# Patient Record
Sex: FEMALE | Race: WHITE | NOT HISPANIC OR LATINO | Employment: UNEMPLOYED | URBAN - METROPOLITAN AREA
[De-identification: names, ages, dates, MRNs, and addresses within clinical notes are randomized per-mention and may not be internally consistent; named-entity substitution may affect disease eponyms.]

---

## 2017-03-31 ENCOUNTER — RECORDS - HEALTHEAST (OUTPATIENT)
Dept: LAB | Facility: CLINIC | Age: 52
End: 2017-03-31

## 2017-03-31 LAB
CHOLEST SERPL-MCNC: 213 MG/DL
FASTING STATUS PATIENT QL REPORTED: NO
HDLC SERPL-MCNC: 40 MG/DL
LDLC SERPL CALC-MCNC: 126 MG/DL
TRIGL SERPL-MCNC: 235 MG/DL

## 2018-03-06 ENCOUNTER — RECORDS - HEALTHEAST (OUTPATIENT)
Dept: ADMINISTRATIVE | Facility: OTHER | Age: 53
End: 2018-03-06

## 2018-03-06 ENCOUNTER — COMMUNICATION - HEALTHEAST (OUTPATIENT)
Dept: VASCULAR SURGERY | Facility: CLINIC | Age: 53
End: 2018-03-06

## 2018-04-06 ENCOUNTER — RECORDS - HEALTHEAST (OUTPATIENT)
Dept: LAB | Facility: CLINIC | Age: 53
End: 2018-04-06

## 2018-04-06 LAB
CHOLEST SERPL-MCNC: 157 MG/DL
FASTING STATUS PATIENT QL REPORTED: ABNORMAL
HDLC SERPL-MCNC: 37 MG/DL
IRON SATN MFR SERPL: 6 % (ref 20–50)
IRON SERPL-MCNC: 20 UG/DL (ref 42–175)
LDLC SERPL CALC-MCNC: 93 MG/DL
TIBC SERPL-MCNC: 348 UG/DL (ref 313–563)
TRANSFERRIN SERPL-MCNC: 279 MG/DL (ref 212–360)
TRIGL SERPL-MCNC: 135 MG/DL

## 2018-09-07 ENCOUNTER — RECORDS - HEALTHEAST (OUTPATIENT)
Dept: LAB | Facility: CLINIC | Age: 53
End: 2018-09-07

## 2018-09-07 LAB
ALBUMIN SERPL-MCNC: 3.6 G/DL (ref 3.5–5)
ALP SERPL-CCNC: 114 U/L (ref 45–120)
ALT SERPL W P-5'-P-CCNC: 16 U/L (ref 0–45)
ANION GAP SERPL CALCULATED.3IONS-SCNC: 9 MMOL/L (ref 5–18)
AST SERPL W P-5'-P-CCNC: 17 U/L (ref 0–40)
BILIRUB SERPL-MCNC: 0.2 MG/DL (ref 0–1)
BUN SERPL-MCNC: 14 MG/DL (ref 8–22)
CALCIUM SERPL-MCNC: 9.2 MG/DL (ref 8.5–10.5)
CHLORIDE BLD-SCNC: 102 MMOL/L (ref 98–107)
CO2 SERPL-SCNC: 25 MMOL/L (ref 22–31)
CREAT SERPL-MCNC: 0.72 MG/DL (ref 0.6–1.1)
GFR SERPL CREATININE-BSD FRML MDRD: >60 ML/MIN/1.73M2
GLUCOSE BLD-MCNC: 294 MG/DL (ref 70–125)
POTASSIUM BLD-SCNC: 4.7 MMOL/L (ref 3.5–5)
PROT SERPL-MCNC: 7.1 G/DL (ref 6–8)
SODIUM SERPL-SCNC: 136 MMOL/L (ref 136–145)

## 2018-09-28 ENCOUNTER — RECORDS - HEALTHEAST (OUTPATIENT)
Dept: LAB | Facility: CLINIC | Age: 53
End: 2018-09-28

## 2018-09-28 LAB
ERYTHROCYTE [DISTWIDTH] IN BLOOD BY AUTOMATED COUNT: 12.7 % (ref 11–14.5)
HCT VFR BLD AUTO: 38 % (ref 35–47)
HGB BLD-MCNC: 12.1 G/DL (ref 12–16)
MCH RBC QN AUTO: 27.3 PG (ref 27–34)
MCHC RBC AUTO-ENTMCNC: 31.8 G/DL (ref 32–36)
MCV RBC AUTO: 86 FL (ref 80–100)
PLATELET # BLD AUTO: 313 THOU/UL (ref 140–440)
PMV BLD AUTO: 10.6 FL (ref 8.5–12.5)
RBC # BLD AUTO: 4.43 MILL/UL (ref 3.8–5.4)
WBC: 10.1 THOU/UL (ref 4–11)

## 2018-12-14 ENCOUNTER — RECORDS - HEALTHEAST (OUTPATIENT)
Dept: LAB | Facility: CLINIC | Age: 53
End: 2018-12-14

## 2018-12-14 LAB
BASOPHILS # BLD AUTO: 0.1 THOU/UL (ref 0–0.2)
BASOPHILS NFR BLD AUTO: 1 % (ref 0–2)
CREAT UR-MCNC: 11.9 MG/DL
EOSINOPHIL # BLD AUTO: 0 THOU/UL (ref 0–0.4)
EOSINOPHIL NFR BLD AUTO: 0 % (ref 0–6)
ERYTHROCYTE [DISTWIDTH] IN BLOOD BY AUTOMATED COUNT: 12.9 % (ref 11–14.5)
HCT VFR BLD AUTO: 40.2 % (ref 35–47)
HGB BLD-MCNC: 13.1 G/DL (ref 12–16)
IRON SATN MFR SERPL: 16 % (ref 20–50)
IRON SERPL-MCNC: 49 UG/DL (ref 42–175)
LYMPHOCYTES # BLD AUTO: 1.9 THOU/UL (ref 0.8–4.4)
LYMPHOCYTES NFR BLD AUTO: 18 % (ref 20–40)
MCH RBC QN AUTO: 27.2 PG (ref 27–34)
MCHC RBC AUTO-ENTMCNC: 32.6 G/DL (ref 32–36)
MCV RBC AUTO: 84 FL (ref 80–100)
MICROALBUMIN UR-MCNC: <0.5 MG/DL (ref 0–1.99)
MICROALBUMIN/CREAT UR: NORMAL MG/G
MONOCYTES # BLD AUTO: 0.4 THOU/UL (ref 0–0.9)
MONOCYTES NFR BLD AUTO: 4 % (ref 2–10)
NEUTROPHILS # BLD AUTO: 8.2 THOU/UL (ref 2–7.7)
NEUTROPHILS NFR BLD AUTO: 77 % (ref 50–70)
PLATELET # BLD AUTO: 304 THOU/UL (ref 140–440)
PMV BLD AUTO: 11.1 FL (ref 8.5–12.5)
RBC # BLD AUTO: 4.81 MILL/UL (ref 3.8–5.4)
TIBC SERPL-MCNC: 302 UG/DL (ref 313–563)
TRANSFERRIN SERPL-MCNC: 242 MG/DL (ref 212–360)
WBC: 10.7 THOU/UL (ref 4–11)

## 2019-03-15 ENCOUNTER — RECORDS - HEALTHEAST (OUTPATIENT)
Dept: LAB | Facility: CLINIC | Age: 54
End: 2019-03-15

## 2019-03-15 LAB
CHOLEST SERPL-MCNC: 210 MG/DL
FASTING STATUS PATIENT QL REPORTED: YES
HDLC SERPL-MCNC: 51 MG/DL
LDLC SERPL CALC-MCNC: 130 MG/DL
TRIGL SERPL-MCNC: 144 MG/DL

## 2019-04-01 ENCOUNTER — RECORDS - HEALTHEAST (OUTPATIENT)
Dept: LAB | Facility: CLINIC | Age: 54
End: 2019-04-01

## 2019-04-03 LAB — BACTERIA SPEC CULT: NORMAL

## 2019-06-14 ENCOUNTER — RECORDS - HEALTHEAST (OUTPATIENT)
Dept: LAB | Facility: CLINIC | Age: 54
End: 2019-06-14

## 2019-06-14 LAB
ALBUMIN SERPL-MCNC: 3.4 G/DL (ref 3.5–5)
ALP SERPL-CCNC: 92 U/L (ref 45–120)
ALT SERPL W P-5'-P-CCNC: 17 U/L (ref 0–45)
ANION GAP SERPL CALCULATED.3IONS-SCNC: 12 MMOL/L (ref 5–18)
AST SERPL W P-5'-P-CCNC: 15 U/L (ref 0–40)
BILIRUB SERPL-MCNC: 0.2 MG/DL (ref 0–1)
BUN SERPL-MCNC: 15 MG/DL (ref 8–22)
CALCIUM SERPL-MCNC: 9.9 MG/DL (ref 8.5–10.5)
CHLORIDE BLD-SCNC: 101 MMOL/L (ref 98–107)
CO2 SERPL-SCNC: 23 MMOL/L (ref 22–31)
CREAT SERPL-MCNC: 0.67 MG/DL (ref 0.6–1.1)
GFR SERPL CREATININE-BSD FRML MDRD: >60 ML/MIN/1.73M2
GLUCOSE BLD-MCNC: 119 MG/DL (ref 70–125)
POTASSIUM BLD-SCNC: 4.8 MMOL/L (ref 3.5–5)
PROT SERPL-MCNC: 6.9 G/DL (ref 6–8)
SODIUM SERPL-SCNC: 136 MMOL/L (ref 136–145)

## 2019-06-17 LAB
25(OH)D3 SERPL-MCNC: 21 NG/ML (ref 30–80)
HPV SOURCE: NORMAL
HUMAN PAPILLOMA VIRUS 16 DNA: NEGATIVE
HUMAN PAPILLOMA VIRUS 18 DNA: NEGATIVE
HUMAN PAPILLOMA VIRUS FINAL DIAGNOSIS: NORMAL
HUMAN PAPILLOMA VIRUS OTHER HR: NEGATIVE
SPECIMEN DESCRIPTION: NORMAL

## 2019-09-27 ENCOUNTER — RECORDS - HEALTHEAST (OUTPATIENT)
Dept: LAB | Facility: CLINIC | Age: 54
End: 2019-09-27

## 2019-09-30 LAB — 25(OH)D3 SERPL-MCNC: 25.5 NG/ML (ref 30–80)

## 2019-10-28 ENCOUNTER — RECORDS - HEALTHEAST (OUTPATIENT)
Dept: ADMINISTRATIVE | Facility: OTHER | Age: 54
End: 2019-10-28

## 2019-10-31 ENCOUNTER — HOSPITAL ENCOUNTER (OUTPATIENT)
Dept: MAMMOGRAPHY | Facility: CLINIC | Age: 54
Discharge: HOME | End: 2019-10-31
Attending: PHYSICIAN ASSISTANT

## 2019-10-31 DIAGNOSIS — N64.4 BREAST PAIN, LEFT: ICD-10-CM

## 2020-01-31 ENCOUNTER — RECORDS - HEALTHEAST (OUTPATIENT)
Dept: LAB | Facility: CLINIC | Age: 55
End: 2020-01-31

## 2020-01-31 LAB
CHOLEST SERPL-MCNC: 234 MG/DL
FASTING STATUS PATIENT QL REPORTED: ABNORMAL
HDLC SERPL-MCNC: 46 MG/DL
LDLC SERPL CALC-MCNC: 150 MG/DL
TRIGL SERPL-MCNC: 189 MG/DL

## 2020-02-17 ENCOUNTER — RECORDS - HEALTHEAST (OUTPATIENT)
Dept: ADMINISTRATIVE | Facility: OTHER | Age: 55
End: 2020-02-17

## 2020-06-19 ENCOUNTER — RECORDS - HEALTHEAST (OUTPATIENT)
Dept: LAB | Facility: CLINIC | Age: 55
End: 2020-06-19

## 2020-06-19 LAB
ALBUMIN SERPL-MCNC: 3.5 G/DL (ref 3.5–5)
ALP SERPL-CCNC: 99 U/L (ref 45–120)
ALT SERPL W P-5'-P-CCNC: 18 U/L (ref 0–45)
ANION GAP SERPL CALCULATED.3IONS-SCNC: 8 MMOL/L (ref 5–18)
AST SERPL W P-5'-P-CCNC: 14 U/L (ref 0–40)
BILIRUB SERPL-MCNC: 0.2 MG/DL (ref 0–1)
BUN SERPL-MCNC: 13 MG/DL (ref 8–22)
CALCIUM SERPL-MCNC: 9 MG/DL (ref 8.5–10.5)
CHLORIDE BLD-SCNC: 100 MMOL/L (ref 98–107)
CO2 SERPL-SCNC: 26 MMOL/L (ref 22–31)
CREAT SERPL-MCNC: 0.67 MG/DL (ref 0.6–1.1)
GFR SERPL CREATININE-BSD FRML MDRD: >60 ML/MIN/1.73M2
GLUCOSE BLD-MCNC: 151 MG/DL (ref 70–125)
POTASSIUM BLD-SCNC: 4.3 MMOL/L (ref 3.5–5)
PROT SERPL-MCNC: 6.8 G/DL (ref 6–8)
SODIUM SERPL-SCNC: 134 MMOL/L (ref 136–145)

## 2020-06-22 LAB
25(OH)D3 SERPL-MCNC: 36 NG/ML (ref 30–80)
COVID-19 ANTIBODY IGG: NEGATIVE

## 2020-09-17 ENCOUNTER — THERAPY VISIT (OUTPATIENT)
Dept: PHYSICAL THERAPY | Facility: CLINIC | Age: 55
End: 2020-09-17
Payer: COMMERCIAL

## 2020-09-17 DIAGNOSIS — M54.50 LOW BACK PAIN: ICD-10-CM

## 2020-09-17 DIAGNOSIS — M54.2 NECK PAIN: ICD-10-CM

## 2020-09-17 PROCEDURE — 97110 THERAPEUTIC EXERCISES: CPT | Mod: GP | Performed by: PHYSICAL THERAPIST

## 2020-09-17 PROCEDURE — 97161 PT EVAL LOW COMPLEX 20 MIN: CPT | Mod: GP | Performed by: PHYSICAL THERAPIST

## 2020-09-17 ASSESSMENT — ACTIVITIES OF DAILY LIVING (ADL)
RECREATIONAL_ACTIVITIES: MODERATE DIFFICULTY
GETTING_INTO_AND_OUT_OF_AN_AVERAGE_CAR: SLIGHT DIFFICULTY
DEEP_SQUATTING: EXTREME DIFFICULTY
PUTTING_ON_SOCKS_AND_SHOES: MODERATE DIFFICULTY
WALKING_INITIALLY: SLIGHT DIFFICULTY
HOS_ADL_ITEM_SCORE_TOTAL: 27
HOS_ADL_HIGHEST_POTENTIAL_SCORE: 60
ROLLING_OVER_IN_BED: SLIGHT DIFFICULTY
WALKING_APPROXIMATELY_10_MINUTES: MODERATE DIFFICULTY
HOS_ADL_COUNT: 15
SITTING_FOR_15_MINUTES: SLIGHT DIFFICULTY
HOS_ADL_SCORE(%): 45
GOING_UP_1_FLIGHT_OF_STAIRS: EXTREME DIFFICULTY
LIGHT_TO_MODERATE_WORK: EXTREME DIFFICULTY
STEPPING_UP_AND_DOWN_CURBS: SLIGHT DIFFICULTY
WALKING_UP_STEEP_HILLS: UNABLE TO DO
GETTING_INTO_AND_OUT_OF_A_BATHTUB: MODERATE DIFFICULTY
TWISTING/PIVOTING_ON_INVOLVED_LEG: MODERATE DIFFICULTY
GOING_DOWN_1_FLIGHT_OF_STAIRS: EXTREME DIFFICULTY
HOW_WOULD_YOU_RATE_YOUR_CURRENT_LEVEL_OF_FUNCTION_DURING_YOUR_USUAL_ACTIVITIES_OF_DAILY_LIVING_FROM_0_TO_100_WITH_100_BEING_YOUR_LEVEL_OF_FUNCTION_PRIOR_TO_YOUR_HIP_PROBLEM_AND_0_BEING_THE_INABILITY_TO_PERFORM_ANY_OF_YOUR_USUAL_DAILY_ACTIVITIES?: 50
WALKING_15_MINUTES_OR_GREATER: EXTREME DIFFICULTY
STANDING_FOR_15_MINUTES: MODERATE DIFFICULTY
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
## 2020-10-01 ENCOUNTER — THERAPY VISIT (OUTPATIENT)
Dept: PHYSICAL THERAPY | Facility: CLINIC | Age: 55
End: 2020-10-01
Payer: MEDICARE

## 2020-10-01 DIAGNOSIS — M54.2 NECK PAIN: ICD-10-CM

## 2020-10-01 DIAGNOSIS — M54.50 LOW BACK PAIN: ICD-10-CM

## 2020-10-01 PROCEDURE — 97110 THERAPEUTIC EXERCISES: CPT | Mod: GP | Performed by: PHYSICAL THERAPIST

## 2020-10-15 ENCOUNTER — THERAPY VISIT (OUTPATIENT)
Dept: PHYSICAL THERAPY | Facility: CLINIC | Age: 55
End: 2020-10-15
Payer: MEDICARE

## 2020-10-15 DIAGNOSIS — M54.2 NECK PAIN: Primary | ICD-10-CM

## 2020-10-15 DIAGNOSIS — M25.552 BILATERAL HIP PAIN: ICD-10-CM

## 2020-10-15 DIAGNOSIS — M25.551 BILATERAL HIP PAIN: ICD-10-CM

## 2020-10-15 DIAGNOSIS — M54.50 LOW BACK PAIN: ICD-10-CM

## 2020-10-15 PROCEDURE — 97110 THERAPEUTIC EXERCISES: CPT | Mod: GP | Performed by: PHYSICAL THERAPIST

## 2020-10-22 ENCOUNTER — TRANSFERRED RECORDS (OUTPATIENT)
Dept: HEALTH INFORMATION MANAGEMENT | Facility: CLINIC | Age: 55
End: 2020-10-22

## 2020-10-22 LAB — HBA1C MFR BLD: 8.2 % (ref 4.2–6.1)

## 2020-10-29 ENCOUNTER — THERAPY VISIT (OUTPATIENT)
Dept: PHYSICAL THERAPY | Facility: CLINIC | Age: 55
End: 2020-10-29
Payer: MEDICARE

## 2020-10-29 DIAGNOSIS — M54.50 LOW BACK PAIN: ICD-10-CM

## 2020-10-29 DIAGNOSIS — M54.2 NECK PAIN: ICD-10-CM

## 2020-10-29 PROCEDURE — 97110 THERAPEUTIC EXERCISES: CPT | Mod: GP | Performed by: PHYSICAL THERAPIST

## 2020-10-29 PROCEDURE — 97535 SELF CARE MNGMENT TRAINING: CPT | Mod: GP | Performed by: PHYSICAL THERAPIST

## 2020-11-05 ENCOUNTER — THERAPY VISIT (OUTPATIENT)
Dept: PHYSICAL THERAPY | Facility: CLINIC | Age: 55
End: 2020-11-05
Payer: MEDICARE

## 2020-11-05 DIAGNOSIS — M54.2 NECK PAIN: ICD-10-CM

## 2020-11-05 DIAGNOSIS — M54.50 LOW BACK PAIN: ICD-10-CM

## 2020-11-05 PROCEDURE — 97535 SELF CARE MNGMENT TRAINING: CPT | Mod: GP | Performed by: PHYSICAL THERAPIST

## 2020-11-05 PROCEDURE — 97110 THERAPEUTIC EXERCISES: CPT | Mod: GP | Performed by: PHYSICAL THERAPIST

## 2020-12-29 RX ORDER — DEXAMETHASONE 6 MG/1
6 TABLET ORAL DAILY
COMMUNITY
Start: 2020-12-28 | End: 2021-01-14

## 2020-12-29 RX ORDER — TRAZODONE HYDROCHLORIDE 100 MG/1
200 TABLET ORAL AT BEDTIME
COMMUNITY
Start: 2019-09-21

## 2020-12-29 RX ORDER — OLANZAPINE 15 MG/1
15 TABLET ORAL AT BEDTIME
COMMUNITY

## 2020-12-29 RX ORDER — CHOLECALCIFEROL (VITAMIN D3) 1250 MCG
50000 CAPSULE ORAL WEEKLY
COMMUNITY
Start: 2019-06-24

## 2020-12-29 RX ORDER — GABAPENTIN 600 MG/1
600 TABLET ORAL 3 TIMES DAILY
COMMUNITY

## 2020-12-29 RX ORDER — ZOLPIDEM TARTRATE 5 MG/1
5 TABLET ORAL
COMMUNITY
Start: 2020-11-25

## 2020-12-29 RX ORDER — CLOZAPINE 25 MG/1
25 TABLET ORAL AT BEDTIME
COMMUNITY

## 2020-12-29 RX ORDER — FERROUS SULFATE 325(65) MG
1 TABLET ORAL DAILY
COMMUNITY

## 2020-12-29 RX ORDER — PIOGLITAZONEHYDROCHLORIDE 45 MG/1
45 TABLET ORAL DAILY
COMMUNITY
Start: 2020-09-18

## 2020-12-29 RX ORDER — VENLAFAXINE HYDROCHLORIDE 150 MG/1
150 CAPSULE, EXTENDED RELEASE ORAL DAILY
COMMUNITY
Start: 2020-12-02

## 2020-12-29 NOTE — PROGRESS NOTES
MTM ENCOUNTER  SUBJECTIVE/OBJECTIVE:                           Monica Miguel is a 55 year old female called for a transitions of care visit. She was discharged from [facility] on - for acute hypoxic respiratory failure secondary to COVID-19. {[facility]ies campusisitdetails:790735}     Medications   Name strength formulation, Sig: take route frequency   Taking Diabetic Lancets . ., Sig: TID insulin dependent E11.65 Start Date: 2020    Taking Clozaril 25mg tablet, Si tab orally qhs    Taking Clotrimazole, Topical 1% Cream, Si mahin applied topically 2 times a day    Taking B-D short ultra fine pen needles . ., Sig: As directed . three times daily Start Date: 2016    Taking Ambien 5 mg tablet, Si tab(s) orally once a day (at bedtime) as needed    Taking Actos 45 mg Tablet, Si TAB(S) ONCE A DAY ORALLY once a day    Taking acetaminophen 500 mg tablet, Si tab(s) orally every 6 hours prn Start Date: 2016    Taking dexamethasone 6 mg tablet, Si tab(s) orally 2 times a day Start Date: 2020 Stop Date: 2020    Taking rivaroxaban 10 mg tablet, Si tab(s) orally once a day Start Date: 2020 Stop Date: 2021    Taking Zyprexa 15mg tablet, Si tab(s) orally qhs    Taking Vitamin D 50,000 intl units capsule, Si cap(s) orally once a week Start Date: 2019    Taking Trazadone 100mg , Sitabs qhs    Taking Neurontin 600 mg Tablet, Si tab(s) 3 times a day orally     Taking Levemir FlexPen 100 units/mL Solution, Sig: up to 50 units once daily    Taking ferrous sulfate 325 mg Tablet, Si tab(s) orally once daily    Taking Effexor  mg capsule, extended release, Si tab orally qhs    Taking Diabetic Test Strips - Strips, Sig: tid insulin dependent Start Date: 2020          Reason for visit: Initial medication review.    Allergies/ADRs: atorvastatin - is trying to get pregnant, Crestor - is trying to get pregnant, penicillin, aspirin   Tobacco:  "She has no history on file for tobacco.  Alcohol: {ALCOHOL CONSUMPTION HX:309452}  Caffeine: {CAFFEINE INTAKE:730956}  Activity: ***  Past Medical History: {2/3//:025751}  {Social and Goals:813870}    Medication Adherence/Access: {fumedadherence:242477}    HPI:  CTA Chest: Negative for PE. Marked diffuse bilateral pulmonary infitrates compatible w/COVID 19 pneumonitis.   Normocytic anemia, encoruage outpatient workup.   Completed 5 days of Remdesivir and 10 days of dexamethasone.  Complete Xaretlo x 30 days and 4 additional days of oral decadron. Increase Levemir while on steroid.  PCP: 20, keiry. Date of symptom onset 12/15 and  per dc summary. MTM: Due to schedule,.     COVID-19:  Currently taking Xarelto 10 mg daily for 30 days after discharge, and dexamethasone 6 mg daily fr 4 days.  She has *** days left on dexamethasone.  Is having very  High BG readings.  See diabetes note.      Schizophrenia:    Current medications include: Venlafaxine 150 mg once daily, Clozapine 25 mg at bedtime, and Olanzapine 15 mg at bedtime. {mtmdepassess:467199}  No flowsheet data found.     Hyperlipidemia:   Current therapy includes no current medications.  Patient reports {mtmlipidsideeffect:304244}  10 year ASCVD risk: 11.8% (intermediate)      LIPID CASCADE - 2020      NAME VALUE REFERENCE RANGE    CHOLESTEROL 234  H <=199 (mg/dL)    TRIGLYCERIDES 189  H <=149 (mg/dL)    HDL CHOLESTEROL 46  L >=50 (mg/dL)    LDL CHOLESTEROL CALCULATED 150  H <=129 (mg/dL)       Insomnia:   Current medications include: trazodone 200 mg at bedtime, and zolpidem 10 mg at bedtime. Patient reports {symptoms:714820}.     Type 2 Diabetes:    Currently taking Pioglitazone 45 mg daily, Levemir 50 units daily. {sideeffects:548547}  Blood sugar monitoring: {MTM self monitorin}. Ranges {Pt report:137391}: {bgranges:005977}  Symptoms of low blood sugar? {HYPOGLYCEMIA SYMPTOMS:583610::\"none\"}  Symptoms of high blood sugar? " {diabetessymptoms:535015}  Eye exam: {up to date:935918}  Foot exam: {up to date:725325}  Diet/Exercise: ***  Aspirin:  not taking due to pt's concern regarding possible pregnancy.   Statin: not taking due to pt's concern regarding possible pregnancy.  ACEi/ARB:not taking due to pt's concern regarding possible pregnancy.  Urine Albumin: WNL, UTD.  See in eCW.  A1c (10/22):  8.2%    Anemia:   Taking ferrous sulfate 325 mg daily.  Pt reports *** side effects.    Vitamin D deficiency:   Taking vitamin D 50,000 units once weekly.  Last vitamin D level WNL, see in ECW.    Pain:   Patient takes acetaminophen 1000 mg every 6 hours PRN.      COMPREHENSIVE METABOLIC - 06/19/2020      NAME VALUE REFERENCE RANGE    SODIUM 134  L 136-145 (mmol/L)    POTASSIUM 4.3 3.5-5.0 (mmol/L)    CHLORIDE 100  (mmol/L)    CO2 26 22-31 (mmol/L)    ANION GAP, CALCULATION 8 5-18 (mmol/L)    GLUCOSE 151  H  (mg/dL)    BLOOD UREA NITROGEN 13 8-22 (mg/dL)    CREATININE 0.67 0.60-1.10 (mg/dL)    GFR MDRD AF AMER >60 >60 (mL/min/1.73m2)    GFR MDRD NON AF AMER >60 >60 (mL/min/1.73m2)    BILIRUBIN, TOTAL 0.2 0.0-1.0 (mg/dL)    CALCIUM 9.0 8.5-10.5 (mg/dL)    PROTEIN, TOTAL 6.8 6.0-8.0 (g/dL)    ALBUMIN 3.5 3.5-5.0 (g/dL)    ALKALINE PHOSPHATASE 99  (U/L)    AST 14 0-40 (U/L)    ALT 18 0-45 (U/L)       10/22  Vitals: Ht 66.25, Wt 285, BMI 45.65, Pulse Regular:80, /70, Arm / Cuff size Large:Right,     Comments HT/WT with shoes, Initials mher.    Today's Vitals: There were no vitals taken for this visit.      ASSESSMENT:                          {mtmpartdquestion:268176}    Medication Adherence: {adherencechoices:166466}    COVID-19:  ***.  Will complete indicated 30 days of anticoagulation.  Has a few more days left of dexamethasone.    Schizophrenia:    ***.     Hyperlipidemia:   Patient is not on moderate intensity statin which is indicated based on 2019 ACC/AHA guidelines for lipid management.  Patient had previously declined  "statin due to possibility of pregnancy.    Insomnia:   ***    Type 2 Diabetes:   {DiabetesAssessGoals:141829}    Anemia:   ***    Vitamin D deficiency:   Stable.  Last vitamin D level WNL.    Pain:   ***    PLAN:                          {Remind patient about MTM survey:827324}{DORY?:587330}  ***    I spent {mtm total time 3:223519} with this patient today{MTMpartdbillingquestion:356856}. { :257935}. A copy of the visit note was provided to the patient's {ccd chart:044658} provider.    Will follow up in ***.    The patient {GIVEN/NOT GIVEN:888013::\"was given\"} a summary of these recommendations. {covisit:479421}    ***    Patient consented to a telehealth visit: {YES(DEFAULT)/NO/MULT:810931::\"yes\"}  Telemedicine Visit Details  Type of service:  {telemedvisitmtm:400916::\"Telephone visit\"}  Start Time: {video/phone visit start time:152948}  End Time: {video/phone visit end time:152948}  Originating Location (patient location): Home  Distant Location (provider location):  [facility]  Mode of Communication:  {telemedmtm2:105784::\"Telephone\"}    {Click at end of visit to add-in MTP:977157}      "

## 2020-12-30 ENCOUNTER — ALLIED HEALTH/NURSE VISIT (OUTPATIENT)
Dept: PHARMACY | Facility: PHYSICIAN GROUP | Age: 55
End: 2020-12-30

## 2020-12-30 DIAGNOSIS — E11.9 TYPE 2 DIABETES MELLITUS WITHOUT COMPLICATION, UNSPECIFIED WHETHER LONG TERM INSULIN USE (H): ICD-10-CM

## 2020-12-30 DIAGNOSIS — E78.5 HYPERLIPIDEMIA LDL GOAL <100: ICD-10-CM

## 2020-12-30 DIAGNOSIS — G47.00 INSOMNIA, UNSPECIFIED TYPE: ICD-10-CM

## 2020-12-30 DIAGNOSIS — D64.9 ANEMIA, UNSPECIFIED TYPE: ICD-10-CM

## 2020-12-30 DIAGNOSIS — R52 PAIN: ICD-10-CM

## 2020-12-30 DIAGNOSIS — F20.9 SCHIZOPHRENIA, UNSPECIFIED TYPE (H): ICD-10-CM

## 2020-12-30 DIAGNOSIS — E55.9 VITAMIN D DEFICIENCY: ICD-10-CM

## 2020-12-30 DIAGNOSIS — U07.1 2019 NOVEL CORONAVIRUS DISEASE (COVID-19): Primary | ICD-10-CM

## 2020-12-30 DIAGNOSIS — Z78.9 TAKES DIETARY SUPPLEMENTS: ICD-10-CM

## 2020-12-30 PROCEDURE — 99605 MTMS BY PHARM NP 15 MIN: CPT | Performed by: PHARMACIST

## 2020-12-30 PROCEDURE — 99607 MTMS BY PHARM ADDL 15 MIN: CPT | Performed by: PHARMACIST

## 2020-12-30 RX ORDER — ACETAMINOPHEN 500 MG
500-1000 TABLET ORAL EVERY 6 HOURS PRN
COMMUNITY

## 2020-12-30 RX ORDER — ACETYLCARNITINE HCL 100 %
POWDER (GRAM) MISCELLANEOUS
COMMUNITY

## 2020-12-30 NOTE — PROGRESS NOTES
MTM ENCOUNTER  SUBJECTIVE/OBJECTIVE:                           Monica Miguel is a 55 year old female called for a transitions of care visit. She was discharged from [facility] on 12/22-12/27 for acute hypoxic respiratory failure secondary to COVID-19.      Reason for visit: Initial medication review following hospital discharge.  Patient had some questions about a few supplements.    Allergies/ADRs: atorvastatin - is trying to get pregnant, Crestor - is trying to get pregnant, penicillin, aspirin   Tobacco: She reports that she has quit smoking. She does not have any smokeless tobacco history on file.  Alcohol: occaisional  Caffeine: 2 cups/day of coffee  Past Medical History: Reviewed in chart      Medication Adherence/Access:   Her insulin sometimes can be expensive.  Usually in July gets into the donut hole, and then the prices go up.  Has [employer] Rx Saver for drug cover      HPI:  CTA Chest: Negative for PE. Marked diffuse bilateral pulmonary infitrates compatible w/COVID 19 pneumonitis.   Normocytic anemia, encoruage outpatient workup.   Completed 5 days of Remdesivir and 10 days of dexamethasone.  Complete Xaretlo x 30 days and 4 additional days of oral decadron. Increase Levemir while on steroid.    COVID-19:  Currently taking Xarelto 10 mg daily for 30 days after discharge, and dexamethasone 6 mg daily for 4 days.  She has 1 day left on dexamethasone.  Is having very high BG readings.  See diabetes note.  Pt still feeling very run down and tired.  She is feeling a lot better, her breathing is a lot better.  Using the incentive spirometer, can get up to 2000 consistently.  Does have coughing fits often  In the hospital she was taking Lovenox.  She had a lot of bruises from these.  Otherwise no issues with bruising or bleeding.    Schizophrenia:    Current medications include: Venlafaxine 150 mg once daily at night, Clozapine 25 mg at bedtime, and Olanzapine 15 mg at bedtime. Unable to fully assess symptoms  today due to running out of time.  Will discuss in more detail at follow up.  Lab monitoring done by psych for clozapine.    Hyperlipidemia:   Current therapy includes no current medications.  Pt hadn't been started on one in the past due to possibility of pregnancy, didn't have time to discuss further today during apt.  10 year ASCVD risk: 11.8% (intermediate).    LIPID CASCADE - 2020      NAME VALUE REFERENCE RANGE    CHOLESTEROL 234  H <=199 (mg/dL)    TRIGLYCERIDES 189  H <=149 (mg/dL)    HDL CHOLESTEROL 46  L >=50 (mg/dL)    LDL CHOLESTEROL CALCULATED 150  H <=129 (mg/dL)     Insomnia:   Current medications include: trazodone 200 mg at bedtime, and zolpidem 5 mg at bedtime PRN (rarely uses). Unable to fully assess insomnia today due to running out of time.    Type 2 Diabetes:    Currently taking Pioglitazone 45 mg daily in the morning, Levemir 50 units daily at bedtime. Patient is not experiencing side effects.  Metformin ER - had GI upset, diarrhea.  Was on glipizide ER as well, unclear why or when she stopped this.  Blood sugar monitorin-2 time(s) daily. Ranges (patient reported):    (2 hour post prandial) - 354,   (AM)- 280,    (PM) - 261,   (AM) - 282,   (AM) - 351,  Symptoms of low blood sugar? shaky, dizzy, weak, sweaty, Frequency of lows- a few in the hospital, notices it when BG is 80 or lower.  Symptoms of high blood sugar? none  Eye exam: not discussed today  Foot exam:not discussed today  Diet/Exercise: little activity right now, just got home from the hospital from having COVID.  Aspirin:  not taking due to pt's concern regarding possible pregnancy.   Statin: not taking due to pt's concern regarding possible pregnancy.  ACEi/ARB:not taking due to pt's concern regarding possible pregnancy.  We did not have time to discuss further today.  Urine Albumin: WNL, UTD.  See in eCW.  A1c:   - 8.4% (in hospital)  10/ - 8.2%    Anemia:   Taking ferrous sulfate 325 mg  daily.  Pt reports no side effects. Hgb stable, see in ECW.    Vitamin D deficiency:   Taking vitamin D 50,000 units once weekly.  Last vitamin D level WNL, see in ECW.  She sees a chiropractor who recommended a weekly vitamin D vs daily.    Pain:   Patient takes acetaminophen 1000 mg every 6 hours PRN.  Doesn't use very often.    Supplements/OTCs:  Taking 'Inner Defense' supplement once daily, and acetyl-L-carnitine 500 mg daily ([employer] Formula).  She is wondering if these supplements are safe for her or it they interact with any of her rx medications.  Has a family member that is registered for '[employer]' distributor, and thought the Inner Defense supplement would be beneficial for her.    Inner Defense:      COMPREHENSIVE METABOLIC - 06/19/2020      NAME VALUE REFERENCE RANGE    SODIUM 134  L 136-145 (mmol/L)    POTASSIUM 4.3 3.5-5.0 (mmol/L)    CHLORIDE 100  (mmol/L)    CO2 26 22-31 (mmol/L)    ANION GAP, CALCULATION 8 5-18 (mmol/L)    GLUCOSE 151  H  (mg/dL)    BLOOD UREA NITROGEN 13 8-22 (mg/dL)    CREATININE 0.67 0.60-1.10 (mg/dL)    GFR MDRD AF AMER >60 >60 (mL/min/1.73m2)    GFR MDRD NON AF AMER >60 >60 (mL/min/1.73m2)    BILIRUBIN, TOTAL 0.2 0.0-1.0 (mg/dL)    CALCIUM 9.0 8.5-10.5 (mg/dL)    PROTEIN, TOTAL 6.8 6.0-8.0 (g/dL)    ALBUMIN 3.5 3.5-5.0 (g/dL)    ALKALINE PHOSPHATASE 99  (U/L)    AST 14 0-40 (U/L)    ALT 18 0-45 (U/L)       10/22  Vitals: Ht 66.25, Wt 285, BMI 45.65, Pulse Regular:80, /70, Arm / Cuff size Large:Right,     Comments HT/WT with shoes, Initials mher.    Today's Vitals: There were no vitals taken for this visit. - telephone encounter      ASSESSMENT:                              Medication Adherence: No issues identified    COVID-19:  Improving.  Will complete indicated 30 days of anticoagulation.  Has a few more days left of dexamethasone.    Schizophrenia:    Not fully assessed.     Hyperlipidemia:   Patient is not on moderate intensity  statin which is indicated based on 2019 ACC/AHA guidelines for lipid management.  Patient had previously declined statin due to possibility of pregnancy.    Insomnia:   Not fully assessed.     Type 2 Diabetes:   Patient is not meeting A1c goal of < 7%. Self monitoring of blood glucose is not at goal of fasting  mg/dL and post prandial < 180 mg/dL. Patient would benefit from increasing the Levemir dose tonight since she has one more dose of dexamethasone tomorrow.  Can increase to 55 units tonight, and then resume 50 units tomorrow night.  Should stay on 50 units nightly until she talks with Kaylen on Monday.  She likely will need additional adjustments with medications going forward, such as adding another medication for postprandial BG.  She would likely be a good candidate for a GLP1 agonist, but if too expensive could add a sulfonylurea.     Anemia:   Stable.    Vitamin D deficiency:   Stable.  Last vitamin D level WNL.    Pain:   Stable.    Supplements/OTCs:  Researching safety of supplements with her other medications.  Acetyl-L-Carnitine:  Can sometimes cause nausea, vomiting, gastrointestinal upset, dry mouth, anorexia, agitation, headache, and insomnia.  Can increase serotonin, interaction with Trazodone, venlafaxine.  Inner Defense:likely not harmful at the doses use in this supplement.  Coconut oil, Oregano, Thyme, lemon grass, Clove, Lemon Peel, Rosemary, Eucalyptus, and Cinnamon Bark: no concerns at low doses.  Clove has some antiplatelet activities, but not likely to cause significant effect.      PLAN:                          Post Discharge Medication Reconciliation Status: discharge medications reconciled and changed, per note/orders.    1.  Increase Levemir to 55 units at bedtime tonight, then tomorrow down to 50 units at night until you talk to Kaylen on Monday.     2.  Test blood sugars 2 hours after one meal per day, and try to do this 3-4 days per week.  3.  Recommend stopping the  acetyl-L-Carnitine supplement due to interaction between prescription medications.      I spent 60 minutes with this patient today. All changes were made via collaborative practice agreement with Dr. Canas. A copy of the visit note was provided to the patient's primary care provider.    Will follow up in 3 weeks.    The patient was mailed a summary of these recommendations.     Sherlyn Love PharmD  Medication Therapy Management Pharmacist  Pager: [phone]      Patient consented to a telehealth visit: yes  Telemedicine Visit Details  Type of service:  Telephone visit  Start Time: 3:15 PM  End Time: 4:16 PM  Originating Location (patient location): [place]  Distant Location (provider location):  [facility]  Mode of Communication:  Telephone

## 2021-01-05 RX ORDER — DIPHENHYDRAMINE HYDROCHLORIDE 25 MG/1
CAPSULE, LIQUID FILLED ORAL
COMMUNITY

## 2021-01-14 ENCOUNTER — ALLIED HEALTH/NURSE VISIT (OUTPATIENT)
Dept: PHARMACY | Facility: PHYSICIAN GROUP | Age: 56
End: 2021-01-14

## 2021-01-14 DIAGNOSIS — Z78.9 TAKES DIETARY SUPPLEMENTS: ICD-10-CM

## 2021-01-14 DIAGNOSIS — R52 PAIN: ICD-10-CM

## 2021-01-14 DIAGNOSIS — G47.00 INSOMNIA, UNSPECIFIED TYPE: ICD-10-CM

## 2021-01-14 DIAGNOSIS — R60.9 EDEMA, UNSPECIFIED TYPE: ICD-10-CM

## 2021-01-14 DIAGNOSIS — D64.9 ANEMIA, UNSPECIFIED TYPE: ICD-10-CM

## 2021-01-14 DIAGNOSIS — F20.9 SCHIZOPHRENIA, UNSPECIFIED TYPE (H): ICD-10-CM

## 2021-01-14 DIAGNOSIS — E55.9 VITAMIN D DEFICIENCY: ICD-10-CM

## 2021-01-14 DIAGNOSIS — U07.1 2019 NOVEL CORONAVIRUS DISEASE (COVID-19): Primary | ICD-10-CM

## 2021-01-14 DIAGNOSIS — E11.9 TYPE 2 DIABETES MELLITUS WITHOUT COMPLICATION, UNSPECIFIED WHETHER LONG TERM INSULIN USE (H): ICD-10-CM

## 2021-01-14 PROCEDURE — 99607 MTMS BY PHARM ADDL 15 MIN: CPT | Performed by: PHARMACIST

## 2021-01-14 PROCEDURE — 99606 MTMS BY PHARM EST 15 MIN: CPT | Performed by: PHARMACIST

## 2021-01-14 NOTE — PROGRESS NOTES
Medication Therapy Management (MTM) Encounter    ASSESSMENT:                            Medication Adherence/Access: No issues identified    COVID-19:  Improving.  Will complete indicated 30 days of anticoagulation.  Has a few more days left of dexamethasone.     Schizophrenia:    Stable.     Insomnia:   Not sleeping as good.  Unsure what's causing it, or if her BG are waking her up when they are really high.  Will continue to monitor.    Edema:  Needs further assessment by provider.  Her symptoms have gradually been getting worse over the last 1-2 weeks, and is now causing pain.  Discussed that if her symptoms acutely worse or having severe pain to go to ED tonight.    DVT unlikely since she is on Xarelto 10 mg still, and symptoms came on gradually.  Also bilateral.     Type 2 Diabetes:   Patient is not meeting A1c goal of < 7%. Self monitoring of blood glucose is not at goal of fasting  mg/dL and post prandial < 180 mg/dL. Patient would benefit continuing the higher dose of Levemir.  She would benefit from adding medication to help with post prandial BG, such as GLP1 agonist.  Ozempic is most effective option, and from online formulary looks like it's covered.  She would also be a good candidate for an SGLT2 inhibitor, may also hep with edema.  If either option too expensive we could consider a sulfonylurea instead.     Anemia:   Stable.     Vitamin D deficiency:   Stable.  Last vitamin D level WNL.     Pain:   Stable.     Supplements/OTCs:  Stable.    PLAN:                            1.  Recommend starting Ozempic 0.25 mg once weekly for 4 weeks, then increase to 0.5 mg weekly.  2.  Scheduled appointment for patient to see Kaylen tomorrow morning for assessment of Edema.    Follow-up: 3 weeks.    SUBJECTIVE/OBJECTIVE:                          Monica Miguel is a 55 year old female called for a follow-up visit. She was referred to me from her [facility] discharge from [facility] on 12/22-12/27 for acute hypoxic  respiratory failure secondary to COVID-19.  Today's visit is a follow-up MTM visit from 12/30     Reason for visit: Follow up on diabetes.  Patient having a lot of trouble sleeping.  Reports new onset swelling.  It is now painful.      Medication Adherence/Access:  Her insulin sometimes can be expensive.  Usually in July gets into the donut hole, and then the prices go up.  Has [employer] Rx Saver for drug cover      HPI:  CTA Chest: Negative for PE. Marked diffuse bilateral pulmonary infitrates compatible w/COVID 19 pneumonitis.   Normocytic anemia, encoruage outpatient workup.   Completed 5 days of Remdesivir and 10 days of dexamethasone.  Complete Xaretlo x 30 days and 4 additional days of oral decadron. Increase Levemir while on steroid.     COVID-19:  Currently taking Xarelto 10 mg daily for 30 days after discharge.  She is done on the dexamethasone now.  She has 1 day left on dexamethasone.    In the hospital she was taking Lovenox.  She had a lot of bruises from these.  Otherwise no issues with bruising or bleeding.     Schizophrenia:    Current medications include: Venlafaxine 150 mg once daily at night, Clozapine 25 mg at bedtime, and Olanzapine 15 mg at bedtime. Denies side effects, has been very stable on these meds.  Has noticed some weight gain/increased appetite from olanzapine.  Denies visual or auditory   Lab monitoring done by psych for clozapine.    Insomnia:   Current medications include: trazodone 200 mg at bedtime, and zolpidem 5 mg at bedtime PRN (rarely uses). Her insomnia hasn't been going well lately, not sleeping well.  Hasn't been able to use her CPAP machine which also makes it harder.  She just wakes up at 3 AM and feels wide awake.    Edema:  Patient has new onset edema in her ankles.  Is having swelling and pain in her left leg.  The skin looks shiny/stretched.  Is a bit warm to the touch on her left leg.  This has been going on since Tuesday.  She is trying to keep it elevated.  Her  right leg is swelling a bit too, but not as severe as the left leg.    Not taking any NSAIDs, has been on pioglitazone and gabapentin for a long time.Eating a low sodium diet, doesn't add it in.  Did have potato chips one day.     Type 2 Diabetes:    Currently taking Pioglitazone 45 mg daily in the morning, Levemir 55 units daily at bedtime. Patient is not experiencing side effects.  Pt is concerned that her BG  Have been very high lately.  Metformin ER - had GI upset, diarrhea.  Was on glipizide ER as well, unclear why or when she stopped this.  Blood sugar monitorin-2 time(s) daily. Ranges (patient reported):   Am fastin (middle of night), 197, 217, 202, 355, 234, 198, 250, 242.  After breakfast: 228   PM: 171, 159, 205.  HS: 267 (post prandial).  Did have one in the 300's  Symptoms of low blood sugar? shaky, dizzy, weak, sweaty, Frequency of lows- a few in the hospital, notices it when BG is 80 or lower.  Symptoms of high blood sugar? none  Eye exam: not discussed today  Foot exam:not discussed today  Diet/Exercise: little activity right now, just got home from the hospital from having COVID.  Aspirin:  not taking due to pt's concern regarding possible pregnancy.   Statin: not taking due to pt's concern regarding possible pregnancy.  ACEi/ARB:not taking due to pt's concern regarding possible pregnancy.  We did not have time to discuss further today.  Urine Albumin: WNL, UTD.  See in eCW.  A1c:   - 8.4% (in hospital)  10/22 - 8.2%        COMPREHENSIVE METABOLIC - 2020        NAME VALUE REFERENCE RANGE     SODIUM 134  L 136-145 (mmol/L)     POTASSIUM 4.3 3.5-5.0 (mmol/L)     CHLORIDE 100  (mmol/L)     CO2 26 22-31 (mmol/L)     ANION GAP, CALCULATION 8 5-18 (mmol/L)     GLUCOSE 151  H  (mg/dL)     BLOOD UREA NITROGEN 13 8-22 (mg/dL)     CREATININE 0.67 0.60-1.10 (mg/dL)     GFR MDRD AF AMER >60 >60 (mL/min/1.73m2)     GFR MDRD NON AF AMER >60 >60 (mL/min/1.73m2)     BILIRUBIN, TOTAL  0.2 0.0-1.0 (mg/dL)     CALCIUM 9.0 8.5-10.5 (mg/dL)     PROTEIN, TOTAL 6.8 6.0-8.0 (g/dL)     ALBUMIN 3.5 3.5-5.0 (g/dL)     ALKALINE PHOSPHATASE 99  (U/L)     AST 14 0-40 (U/L)     ALT 18 0-45 (U/L)      1/8/2021  Vitals: Ht 66.25, Wt 271.0, BMI 43.41, Pulse 101, /68, Arm / Cuff size rt.lrg, Temp 97.8, Pulse Oximetry 97, Initials jn.      Today's Vitals: There were no vitals taken for this visit. - telephone encounter.  ----------------  Post Discharge Medication Reconciliation Status: medication reconcilation previously completed during another office visit.      I spent 45 minutes with this patient today. I offer these suggestions for consideration by Kaylen Olsen. A copy of the visit note was provided to the patient's primary care provider.    The patient was mailed a summary of these recommendations.     Sherlyn Love, PharmD  Medication Therapy Management Pharmacist  Pager: [phone]      Telemedicine Visit Details  Type of service:  Telephone visit  Start Time: 3:00 PM  End Time: 3:44 PM  Originating Location (patient location): Home  Distant Location (provider location):  [facility]

## 2021-01-15 ENCOUNTER — RECORDS - HEALTHEAST (OUTPATIENT)
Dept: LAB | Facility: CLINIC | Age: 56
End: 2021-01-15

## 2021-01-15 LAB
ALBUMIN SERPL-MCNC: 3.2 G/DL (ref 3.5–5)
ALP SERPL-CCNC: 106 U/L (ref 45–120)
ALT SERPL W P-5'-P-CCNC: 17 U/L (ref 0–45)
ANION GAP SERPL CALCULATED.3IONS-SCNC: 10 MMOL/L (ref 5–18)
AST SERPL W P-5'-P-CCNC: 13 U/L (ref 0–40)
BILIRUB SERPL-MCNC: 0.2 MG/DL (ref 0–1)
BUN SERPL-MCNC: 12 MG/DL (ref 8–22)
CALCIUM SERPL-MCNC: 8.7 MG/DL (ref 8.5–10.5)
CHLORIDE BLD-SCNC: 101 MMOL/L (ref 98–107)
CO2 SERPL-SCNC: 25 MMOL/L (ref 22–31)
CREAT SERPL-MCNC: 0.65 MG/DL (ref 0.6–1.1)
GFR SERPL CREATININE-BSD FRML MDRD: >60 ML/MIN/1.73M2
GLUCOSE BLD-MCNC: 258 MG/DL (ref 70–125)
POTASSIUM BLD-SCNC: 4.2 MMOL/L (ref 3.5–5)
PROT SERPL-MCNC: 6.2 G/DL (ref 6–8)
SODIUM SERPL-SCNC: 136 MMOL/L (ref 136–145)

## 2021-02-05 ENCOUNTER — ALLIED HEALTH/NURSE VISIT (OUTPATIENT)
Dept: PHARMACY | Facility: PHYSICIAN GROUP | Age: 56
End: 2021-02-05

## 2021-02-05 DIAGNOSIS — E78.5 HYPERLIPIDEMIA LDL GOAL <100: ICD-10-CM

## 2021-02-05 DIAGNOSIS — E11.9 TYPE 2 DIABETES MELLITUS WITHOUT COMPLICATION, UNSPECIFIED WHETHER LONG TERM INSULIN USE (H): ICD-10-CM

## 2021-02-05 DIAGNOSIS — Z78.9 TAKES DIETARY SUPPLEMENTS: ICD-10-CM

## 2021-02-05 DIAGNOSIS — R60.9 EDEMA, UNSPECIFIED TYPE: ICD-10-CM

## 2021-02-05 DIAGNOSIS — G47.00 INSOMNIA, UNSPECIFIED TYPE: ICD-10-CM

## 2021-02-05 DIAGNOSIS — F20.9 SCHIZOPHRENIA, UNSPECIFIED TYPE (H): Primary | ICD-10-CM

## 2021-02-05 DIAGNOSIS — E55.9 VITAMIN D DEFICIENCY: ICD-10-CM

## 2021-02-05 DIAGNOSIS — R52 PAIN: ICD-10-CM

## 2021-02-05 DIAGNOSIS — D64.9 ANEMIA, UNSPECIFIED TYPE: ICD-10-CM

## 2021-02-05 PROCEDURE — 99607 MTMS BY PHARM ADDL 15 MIN: CPT | Performed by: PHARMACIST

## 2021-02-05 PROCEDURE — 99605 MTMS BY PHARM NP 15 MIN: CPT | Performed by: PHARMACIST

## 2021-02-05 RX ORDER — ASPIRIN 81 MG/1
81 TABLET ORAL DAILY
COMMUNITY
End: 2021-07-09

## 2021-02-05 NOTE — PROGRESS NOTES
Medication Therapy Management (MTM) Encounter    ASSESSMENT:                            Medication Adherence/Access: No issues identified    COVID-19 Vaccine:  Education provided on Covid vaccines, and discussed that she would be a candidate for one.  Education provided on difference between stem cells that are taken from a fetus, and those that may be 30-50 years descendant of fetus.  Pt unsure that she would get the Moderna vaccine, but encouraged her to consider other COVID vaccines.     Schizophrenia:    Stable.     Insomnia:   Not sleeping as good.  Unsure what's causing it, or if her BG are waking her up when they are really high.  Will continue to monitor.     Edema:  Improved since starting furosemide and compression socks.  Patient could try taking furosemide every other day to see if lower dosing would still be effective.  Continuing to use the compression socks daily     Type 2 Diabetes:   Patient is not meeting A1c goal of < 7%. Self monitoring of blood glucose is not at goal of fasting  mg/dL and post prandial < 180 mg/dL. Patient would benefit continuing the higher dose of Levemir for now, but may need to reduce it if her BG start to come down significantly on the higher Ozempic dose.  She is doing well so far on Ozempic, and discussed decreasing levemir by 5 units if her AM fasting start going below 100 frequently.  Pioglitazone hasn't been increased recently, but could be contributing to peripheral edema.  Would be best to try getting her off this as we titrate up the Ozempic dose.  She would also be a good candidate for an SGLT2 inhibitor if needed, may also hep with edema.  If either option too expensive we could consider a sulfonylurea instead.     Anemia:   Stable.     Vitamin D deficiency:   Stable.  Last vitamin D level WNL.     Pain:   Stable.     Supplements/OTCs:  Stable.    PLAN:                            1.  If seeing AM fasting blood sugars below 100 mg most mornings of the week,  decrease Levemir to 50 units (5 unit decrease).  This may be more likely to happen as we increase the Ozempic dose to 0.5 mg weekly.    Follow-up: 3 weeks    SUBJECTIVE/OBJECTIVE:                          Monica Miguel is a 55 year old female called for a follow-up visit. She was referred to me from Kaylen Olsen.  Today's visit is a follow-up [facility] visit from 1/15/2021     Reason for visit: follow up with patient after starting Ozempic, and furosemide for edema.    Allergies/ADRs: Reviewed in chart  Tobacco: She reports that she has quit smoking. She does not have any smokeless tobacco history on file.  Alcohol: not currently using  Caffeine: no caffeine  Past Medical History: Reviewed in chart      Medication Adherence/Access: no issues reported  Her insulin sometimes can be expensive.  Usually in July gets into the donut hole, and then the prices go up.  Has [employer] for drug cover    COVID Vaccine:  Patient has some questions about the available COVID vaccines.  She is worried after hearing that the Moderna vaccine was developed using stem cells from aborted fetuses.  Discussed that the cells the use in the     Schizophrenia:   Current medications include: Venlafaxine 150 mg once daily at night, Clozapine 25 mg at bedtime, and Olanzapine 15 mg at bedtime. Denies side effects, has been very stable on these meds.  Has noticed some weight gain/increased appetite from olanzapine.  Denies visual or auditory hallucinations.  Occassionally has a little Lab monitoring done by psych for clozapine.     Insomnia:   Current medications include: trazodone 200 mg at bedtime, and zolpidem 5 mg at bedtime PRN (rarely uses). Her insomnia hasn't been going well lately, not sleeping well.  Hasn't been able to use her CPAP machine which also makes it harder.  She just wakes up at 3 AM and feels wide awake.     Edema:  Patient taking furosemide 20 mg daily.  Her edema improved significantly since starting furosemide.  She would  like to try coming off it   She is also using compression socks and this helps a lot with edema as well.     Type 2 Diabetes:    Currently taking Pioglitazone 45 mg daily in the morning, Levemir 55 units daily at bedtime, Ozempic 0.25 mg weekly (3 doses) . Patient is not experiencing side effects.    Metformin ER - had GI upset, diarrhea.  Pt has chronic edema long term, and possibly related to pioglitazone.  Was on glipizide ER as well, unclear why or when she stopped this.  Blood sugar monitorin-2 time(s) daily. Ranges (patient reported):   Am fastin, 174, 129, 119, 123, 116, 209  Symptoms of low blood sugar? shaky, dizzy, weak, sweaty, Frequency of lows- a few in the hospital, notices it when BG is 80 or lower.  Symptoms of high blood sugar? none  Eye exam: not discussed today  Foot exam:not discussed today  Diet/Exercise: little activity right now, just got home from the hospital from having COVID.  Aspirin:  taking aspirin 81 mg daily.  Pt was on this in the past but she restarted it due to recent COVID.     Statin: not taking due to pt's concern regarding possible pregnancy.  Discussed with patient today, and she wouldn't want to start one right now.  ACEi/ARB: not taking due to pt's concern regarding possible pregnancy.  She thinks she missed her last two periods (last period ), she is noticing some night sweats at times and is possibly thinking she may be going into menopause.  She doesn't think she is pregnant.  Urine Albumin: WNL, UTD.  See in eCW.  A1c:   - 8.4% (in hospital)  10/22 - 8.2%     COMPREHENSIVE METABOLIC - 2020        NAME VALUE REFERENCE RANGE     SODIUM 134  L 136-145 (mmol/L)     POTASSIUM 4.3 3.5-5.0 (mmol/L)     CHLORIDE 100  (mmol/L)     CO2 26 22-31 (mmol/L)     ANION GAP, CALCULATION 8 5-18 (mmol/L)     GLUCOSE 151  H  (mg/dL)     BLOOD UREA NITROGEN 13 8-22 (mg/dL)     CREATININE 0.67 0.60-1.10 (mg/dL)     GFR MDRD AF AMER >60 >60  (mL/min/1.73m2)     GFR MDRD NON AF AMER >60 >60 (mL/min/1.73m2)     BILIRUBIN, TOTAL 0.2 0.0-1.0 (mg/dL)     CALCIUM 9.0 8.5-10.5 (mg/dL)     PROTEIN, TOTAL 6.8 6.0-8.0 (g/dL)     ALBUMIN 3.5 3.5-5.0 (g/dL)     ALKALINE PHOSPHATASE 99  (U/L)     AST 14 0-40 (U/L)     ALT 18 0-45 (U/L)        Anemia:   Taking ferrous sulfate 325 mg daily.  Pt reports no side effects. Hgb stable, see in ECW.     Vitamin D deficiency:   Taking vitamin D 50,000 units once weekly.  Last vitamin D level WNL, see in ECW.  She sees a chiropractor who recommended a weekly vitamin D vs daily.     Pain:   Patient takes acetaminophen 1000 mg every 6 hours PRN.  Doesn't use very often.     Supplements/OTCs:  Taking 'Inner Defense' supplement once daily, and acetyl-L-carnitine 500 mg daily ([employer] Formula).  She is wondering if these supplements are safe for her or it they interact with any of her rx medications.  Has a family member that is registered for '[employer]' distributor, and thought the Inner Defense supplement would be beneficial for her.     Inner Defense:      1/8/2021  Vitals: Ht 66.25, Wt 271.0, BMI 43.41, Pulse 101, /68, Arm / Cuff size rt.lrg, Temp 97.8, Pulse Oximetry 97, Initials jn.       Today's Vitals: There were no vitals taken for this visit. - telephone encounter.  ----------------        I spent 60 minutes with this patient today. All changes were made via collaborative practice agreement with Dr. Canas. A copy of the visit note was provided to the patient's primary care provider.    The patient was mailed a summary of these recommendations.     Sherlyn Love, PharmD  Medication Therapy Management Pharmacist  Pager: [phone]'    Telemedicine Visit Details  Type of service:  Telephone visit  Start Time: 11:00 Am  End Time: 12:00 PM  Originating Location (patient location): Home  Distant Location (provider location):  [facility]

## 2021-02-12 ENCOUNTER — RECORDS - HEALTHEAST (OUTPATIENT)
Dept: LAB | Facility: CLINIC | Age: 56
End: 2021-02-12

## 2021-02-12 LAB
ANION GAP SERPL CALCULATED.3IONS-SCNC: 9 MMOL/L (ref 5–18)
BUN SERPL-MCNC: 16 MG/DL (ref 8–22)
CALCIUM SERPL-MCNC: 8.9 MG/DL (ref 8.5–10.5)
CHLORIDE BLD-SCNC: 104 MMOL/L (ref 98–107)
CO2 SERPL-SCNC: 25 MMOL/L (ref 22–31)
CREAT SERPL-MCNC: 0.64 MG/DL (ref 0.6–1.1)
GFR SERPL CREATININE-BSD FRML MDRD: >60 ML/MIN/1.73M2
GLUCOSE BLD-MCNC: 117 MG/DL (ref 70–125)
POTASSIUM BLD-SCNC: 4.3 MMOL/L (ref 3.5–5)
SODIUM SERPL-SCNC: 138 MMOL/L (ref 136–145)

## 2021-02-26 ENCOUNTER — ALLIED HEALTH/NURSE VISIT (OUTPATIENT)
Dept: PHARMACY | Facility: PHYSICIAN GROUP | Age: 56
End: 2021-02-26

## 2021-02-26 DIAGNOSIS — E11.9 TYPE 2 DIABETES MELLITUS WITHOUT COMPLICATION, UNSPECIFIED WHETHER LONG TERM INSULIN USE (H): Primary | ICD-10-CM

## 2021-02-26 DIAGNOSIS — N92.6 IRREGULAR MENSES: ICD-10-CM

## 2021-02-26 DIAGNOSIS — R60.9 EDEMA, UNSPECIFIED TYPE: ICD-10-CM

## 2021-02-26 PROCEDURE — 99606 MTMS BY PHARM EST 15 MIN: CPT | Performed by: PHARMACIST

## 2021-02-26 PROCEDURE — 99607 MTMS BY PHARM ADDL 15 MIN: CPT | Performed by: PHARMACIST

## 2021-02-26 RX ORDER — FUROSEMIDE 20 MG
20 TABLET ORAL DAILY
COMMUNITY

## 2021-02-26 NOTE — PROGRESS NOTES
Medication Therapy Management (MTM) Encounter    ASSESSMENT:                            Medication Adherence/Access: No issues identified    Irregular Menstrual Cycle:  It's possible patient is starting to have symptoms of menopause.  Recommended she discuss further with Kaylen at her next appointment.  Recommended against using douches due to risk of vaginal infections and increased risk of pelvic inflammatory disease.  Suggested washing her vaginal area with soap and water or can try an external body wash for vaginal area.  If her menstrual changes become bothersome, could consider starting estrogen topical vaginal cream 2-3 times weekly.  May help with vaginal odor.  Discussed that if she had symptoms of itching, redness, white vaginal discharge, dysuria or hematuria to see Kaylen sooner.      Edema:  Stable.    Type 2 Diabetes:   Patient is not meeting A1c goal of < 7%. Self monitoring of blood glucose is not at goal of fasting  mg/dL and post prandial < 180 mg/dL. Patient would benefit continuing the higher dose of Levemir for now, but may need to reduce it if her BG start to come down significantly on the higher Ozempic dose.  She is doing well so far on Ozempic, and discussed decreasing levemir by 5 units if her AM fasting start going below 100 frequently.  Pioglitazone hasn't been increased recently, but could be contributing to peripheral edema.  Would could try getting her off this as we titrate up the Ozempic dose.  She would also be a good candidate for an SGLT2 inhibitor if needed, may also hep with edema.  If either option too expensive we could consider a sulfonylurea instead.    PLAN:                            1.  Will send in new prescription for Ozempic with current directions of 0.5 mg once weekly to help avoid confusion.      Follow-up: 2 months    SUBJECTIVE/OBJECTIVE:                          Monica Miguel is a 55 year old female called for a follow-up visit. She was referred to me from Kaylen  Raúl.  Today's visit is a follow-up MTM visit from 2021.     Reason for visit: Follow up on diabetes.  Patient had some concerns about irregular menses she recently had.    Medication Adherence/Access: no issues reported.    Irregular Menstrual Cycle:  Pt has a menstrual cycle in 2020, but then didn't have one until about a month ago.  She said it was very unusual, started out like a regular menses.  [place] through the color of the blood was greyish colored and smelled very bad.    Pt is worried about being able to be intimate with her .  She doesn't like the odor with her period, and tried using a douche.  She was worried that the gray blood meant it was still there from her period in 2020.        Edema:  Taking Furosemide 20 mg every other day.  Says this has been working really well for her edema, no longer bothering her like it was a few years ago.      Type 2 Diabetes:    Currently taking Pioglitazone 45 mg daily in the morning, Levemir 55 units daily at bedtime, Ozempic 0.5 mg (2 doses of 0.5 mg). Patient is not experiencing side effects.  Pt feels her BG are trending downwards a bit, but still not at goal.  She went to get the Ozempic refilled she was a bit confused about the titration directions.  She reports a couple instances of diarrhea in the last few weeks.  Hasn't been bothersome, and she isn't sure it's the Ozempic causing is.  She will keep an eye on it.    Metformin ER - had GI upset, diarrhea.  Pt has chronic edema long term, and possibly related to pioglitazone.  Pt said the edema is a lot better.  Only wearing the compression socks a couple times a week if on her feet.  Was on glipizide ER as well, unclear why or when she stopped this.  Blood sugar monitorin-2 time(s) daily. Ranges (patient reported):   Am fastin today, 110, 110, 102, 112, 115, 106, 127.    30 day average - 126.   15 day- 115  7 days- 108  Symptoms of low blood sugar? shaky, dizzy, weak,  sweaty, Frequency of lows- none since adding Ozempic  Symptoms of high blood sugar? none  Eye exam: not discussed today  Foot exam:not discussed today  Diet/Exercise: little activity right now, just got home from the hospital from having COVID.  Aspirin:  taking aspirin 81 mg daily.  Pt was on this in the past but she restarted it due to recent COVID.     Statin: not taking due to pt's concern regarding possible pregnancy.  Discussed with patient today, and she wouldn't want to start one right now.  ACEi/ARB: not taking due to pt's concern regarding possible pregnancy.  She thinks she missed her last two periods (last period Nov 22nd), she is noticing some night sweats at times and is possibly thinking she may be going into menopause.  She doesn't think she is pregnant.  Urine Albumin: WNL, UTD.  See in eCW.  A1c:  12/23 - 8.4% (in hospital)  10/22 - 8.2%      Today's Vitals: There were no vitals taken for this visit.  - telephone encounter  ---------------    I spent 60 minutes with this patient today. All changes were made via collaborative practice agreement with Dr. Canas. A copy of the visit note was provided to the patient's primary care provider.    The patient was mailed a summary of these recommendations.     Sherlyn Love PharmD  Medication Therapy Management Pharmacist  Pager: [phone]      Telemedicine Visit Details  Type of service:  Telephone visit  Start Time: 3:34  End Time: 4:35 PM  Originating Location (patient location): Home  Distant Location (provider location):  [facility]

## 2021-03-02 RX ORDER — SEMAGLUTIDE 1.34 MG/ML
0.5 INJECTION, SOLUTION SUBCUTANEOUS WEEKLY
COMMUNITY

## 2021-03-03 NOTE — PATIENT INSTRUCTIONS
Recommendations from today's MTM visit:                                                    MTM (medication therapy management) is a service provided by a clinical pharmacist designed to help you get the most of out of your medicines.   Today we reviewed what your medicines are for, how to know if they are working, that your medicines are safe and how to make your medicine regimen as easy as possible.      1.  I sernt in new prescription for Ozempic with current directions of 0.5 mg once weekly to help avoid confusion.          It was great to speak with you today.  I value your experience and would be very thankful for your time with providing feedback on our clinic survey. You may receive a survey via email or text message in the next few days.     Next MTM visit: 4/9/2021 at 2:30 PM    To schedule another MTM appointment, please call the clinic directly or you may call the MTM scheduling line at [phone] or toll-free at [phone].     My Clinical Pharmacist's contact information:                                                      It was a pleasure talking with you today!  Please feel free to contact me with any questions or concerns you have.      Sherlyn Love, PharmD  Medication Therapy Management Pharmacist  Pager: [phone]

## 2021-03-10 PROBLEM — M54.50 LOW BACK PAIN: Status: RESOLVED | Noted: 2020-09-17 | Resolved: 2021-03-10

## 2021-03-10 PROBLEM — M54.2 NECK PAIN: Status: RESOLVED | Noted: 2020-09-17 | Resolved: 2021-03-10

## 2021-03-10 NOTE — PROGRESS NOTES
Discharge Note    Progress reporting period is from initial eval to Nov 5, 2020.     Monica failed to return for next follow up visit and current status is unknown.  Please see information below for last relevant information on current status.  Patient seen for 5 visits.    SUBJECTIVE  Subjective changes noted by patient:  Pt states she is still getting better. Feels overall about 60% improved with neck and hip. Standing still painful but tolerating better. HEP going well.  .  Current pain level is 3/10.     Previous pain level was  3/10.   Changes in function:  Yes (See Goal flowsheet attached for changes in current functional level)  Adverse reaction to treatment or activity: None    OBJECTIVE  Changes noted in objective findings: Fatigues quickly with sit to stands     ASSESSMENT/PLAN  Diagnosis: LBP   DIAGP:  Diagnoses of Neck pain and Low back pain were pertinent to this visit.  STG/LTGs have been met or progress has been made towards goals:  Yes, please see goal flowsheet for most current information  Assessment of Progress: current status is unknown.    Last current status: Pt is progressing well   Self Management Plans:  HEP  I have re-evaluated this patient and find that the nature, scope, duration and intensity of the therapy is appropriate for the medical condition of the patient.  Monica continues to require the following intervention to meet STG and LTG's:  HEP.    Recommendations:  Discharge with current home program.  Patient to follow up with MD as needed.    Please refer to the daily flowsheet for treatment today, total treatment time and time spent performing 1:1 timed codes.

## 2021-04-09 ENCOUNTER — TRANSFERRED RECORDS (OUTPATIENT)
Dept: HEALTH INFORMATION MANAGEMENT | Facility: CLINIC | Age: 56
End: 2021-04-09

## 2021-04-09 ENCOUNTER — RECORDS - HEALTHEAST (OUTPATIENT)
Dept: LAB | Facility: CLINIC | Age: 56
End: 2021-04-09

## 2021-04-09 LAB
CHOLEST SERPL-MCNC: 201 MG/DL
FASTING STATUS PATIENT QL REPORTED: ABNORMAL
HBA1C MFR BLD: 6.1 % (ref 4.2–6.1)
HDLC SERPL-MCNC: 46 MG/DL
LDLC SERPL CALC-MCNC: 123 MG/DL
TRIGL SERPL-MCNC: 162 MG/DL

## 2021-04-10 LAB
CREAT UR-MCNC: 50.7 MG/DL
MICROALBUMIN UR-MCNC: <0.5 MG/DL (ref 0–1.99)
MICROALBUMIN/CREAT UR: NORMAL MG/G{CREAT}

## 2021-04-30 ENCOUNTER — VIRTUAL VISIT (OUTPATIENT)
Dept: PHARMACY | Facility: PHYSICIAN GROUP | Age: 56
End: 2021-04-30

## 2021-04-30 DIAGNOSIS — E11.9 TYPE 2 DIABETES MELLITUS WITHOUT COMPLICATION, UNSPECIFIED WHETHER LONG TERM INSULIN USE (H): Primary | ICD-10-CM

## 2021-04-30 PROCEDURE — 99607 MTMS BY PHARM ADDL 15 MIN: CPT | Performed by: PHARMACIST

## 2021-04-30 PROCEDURE — 99606 MTMS BY PHARM EST 15 MIN: CPT | Performed by: PHARMACIST

## 2021-04-30 NOTE — PATIENT INSTRUCTIONS
Recommendations from today's MTM visit:                                                    MTM (medication therapy management) is a service provided by a clinical pharmacist designed to help you get the most of out of your medicines.   Today we reviewed what your medicines are for, how to know if they are working, that your medicines are safe and how to make your medicine regimen as easy as possible.      1.  Recommend checking your blood sugars 2 hours after one meal. Try to do this about 3 to 4 days/week. Our goal for the after meal blood sugar readings is to have been below 180.   Please call Kaylen or myself to let us know if your blood sugars are frequently going above 200.    2.  We discussed applying for the patient assistance program to get Levemir and Ozempic for free from the drug company. For families with 2 people in the household their income cut off is $69,680. So if your income is below this threshold he would likely qualify to get Ozempic and Levemir for free for the rest of the year.    The one possible downside with getting Ozempic and Levemir through this program is that it would not apply towards the donut hole. Your oral medications would likely stay in the donut hole for the rest of the year. Another option to consider would be to go ahead and purchase the Ozempic and Levemir through your insurance, because you will likely be out of the donut hole within 2 to 3 months and then your cost on all of your medications would be lower for the rest of the year.    When you were in the donut hole you have to spend $2,430 before you will get out of the donut hole.    This $2430 is what you spend out-of-pocket on your medications and what the insurance company pays. The cash price of your insulin and Ozempic adds up to about $1300. So after purchasing both Ozempic and Levemir for just 2 months you would likely be out of the coverage gap already. None the cost of your medications would remain low until  "December 31    What are the differences between the Coverage Phases?    Deductible Phase  Until you meet your yearly Part D deductible, you will pay full price for your covered prescriptions. Once the annual deductible is met the plan will begin to cover the cost of your drugs based on the plans prescription drug benefits. While deductibles can vary from plan to plan, no plans deductible can be higher than $445 in 2021, and some plans have no deductible.    Before Gap, or Initial Coverage Phase  In this phase, you will either pay a copay or coinsurance (a percentage of the drug s cost) when you have a prescription filled. This phase lasts until you and your plan reach a total of $4,130 in 2021 drug spending, at which time you move into the Coverage Gap phase.    During Gap, or Coverage Gap Phase  This phase is commonly referred to as the \"Donut Hole.\" It occurs after you and your plan reach $4,130 in 2021 in drug spending. During the Coverage Gap phase, you are usually responsible for paying a higher portion of the drug cost. After your true out-of-pocket costs (TrOOP) reach a total of $6,550 in 2021, you move into the Catastrophic Coverage phase. Out-of-pocket costs include your annual deductible as well as your copayments or coinsurance. Premiums do not count towards out-of-pocket costs.    Note: Some plans provide additional coverage in the Coverage Gap, which can lower your share of the cost for drugs during this phase.    After Gap, or Catastrophic Coverage Phase  After you reach a total of $6,550 in 2021 in out-of-pocket prescription drug expenses, you will start paying a different copay or coinsurance for both generic and brand-name prescription drugs. In the Catastrophic Coverage phase, copays are typically lower than during the Initial Coverage phase. This phase lasts until the end of the plan year.      Follow-up: 5/17/2021 at 2:30 PM, telephone follow-up    It was great to speak with you today.  I value " your experience and would be very thankful for your time with providing feedback on our clinic survey. You may receive a survey via email or text message in the next few days.     To schedule another MTM appointment, please call the clinic directly or you may call the MTM scheduling line at [phone] or toll-free at [phone].     My Clinical Pharmacist's contact information:                                                      Please feel free to contact me with any questions or concerns you have.      Sherlyn Love PharmD  Medication Therapy Management Pharmacist  Pager: [phone]

## 2021-04-30 NOTE — PROGRESS NOTES
Medication Therapy Management (MTM) Encounter    ASSESSMENT:                            Medication Adherence/Access: No issues identified    Type 2 Diabetes: Patient is meeting A1c goal of < 7%. Self monitoring of blood glucose is at goal of fasting  mg/dL. Patient would benefit from minimum SMBG: Check blood sugars fasting, and occasionally 2 hours after starting a meal. Would be beneficial for patient to stay on Ozempic since it has helped with getting A1c to goal. The steroids while in the hospital likely contributed to A1c is going into the 8% range, however it has been since 2019 that her A1c was below 7%.  Patient would likely qualify to get Ozempic and Levemir for free from the drug company. Discussed this is a possible option and I will send her the application to sign up for it. We also discussed the donut hole and that if she were to get the Ozempic and Levemir right now she would be out of the donut hole within 2 to 3 months. Otherwise if she is just getting the Levemir with her insurance she may be in the donut hole for the rest of the year. She is considering if she would prefer to just get Levemir and Ozempic from the pharmacy so that she will be out of the donut hole sooner    PLAN:                            1. Mailing patient the patient assistance program application to get Ozempic and Levemir for free from the drug company    2. Recommend checking your blood sugars 2 hours after one meal       Follow-up: 5/17/2021 at 2:30 PM, telephone follow-up    SUBJECTIVE/OBJECTIVE:                          Monica Miguel is a 55 year old female called for a follow-up visit. She was referred to me from Kaylen Olsen PA-C.  Today's visit is a follow-up MTM visit from 2/26/2021     Reason for visit: Follow up on diabetes and Ozempic.  The cost of Ozempic is too high right now.  Calling to discuss alternatives or ways to get the price down.    Allergies/ADRs: Reviewed in chart  Tobacco: She reports that she has  quit smoking. She does not have any smokeless tobacco history on file.  Alcohol: not currently using  Caffeine: no caffeine  Activity: Little activity right now  Past Medical History: Reviewed in chart      Medication Adherence/Access: Patient was not able to take her dose of Ozempic that was due last week. She said it was about $230 for the co-pay for 1 month. She is currently in the donut hole. Her Levemir insulin also went up to about $120, and the cost of her oral medications have also increased    Type 2 Diabetes:    Currently taking Pioglitazone 45 mg daily in the morning, Levemir 55 units daily at bedtime, Ozempic 0.5 mg (2 doses of 0.5 mg). Patient is not experiencing side effects.  Pt feels her BG are trending downwards a bit, but still not at goal.  She went to get the Ozempic refilled she was a bit confused about the titration directions.  She reports a couple instances of diarrhea in the last few weeks.  Hasn't been bothersome, and she isn't sure it's the Ozempic causing is.  She will keep an eye on it.    She wouldn't want to take meal time insulin  Metformin ER - had GI upset, diarrhea.  Pt has chronic edema long term, and possibly related to pioglitazone.  Pt said the edema is a lot better.  Only wearing the compression socks a couple times a week if on her feet.  Was on glipizide ER as well, unclear why or when she stopped this.  Blood sugar monitorin-2 time(s) daily. Ranges (patient reported):   Am fastin, 125, 104, 101.    Symptoms of low blood sugar? shaky, dizzy, weak, sweaty, Frequency of lows- none since adding Ozempic  Symptoms of high blood sugar? none  Eye exam: not discussed today  Foot exam:not discussed today  Diet/Exercise: little activity right now, just got home from the hospital from having COVID.  Aspirin:  taking aspirin 81 mg daily.  Pt was on this in the past but she restarted it due to recent COVID.     Statin: not taking due to pt's concern regarding possible  pregnancy.  Discussed with patient today, and she wouldn't want to start one right now.  ACEi/ARB: not taking due to pt's concern regarding possible pregnancy.  She thinks she missed her last two periods (last period Nov 22nd), she is noticing some night sweats at times and is possibly thinking she may be going into menopause.  She doesn't think she is pregnant.  Urine Albumin: WNL, UTD.  See in eCW.    Hemoglobin A1C   Date Value Ref Range Status   04/09/2021 6.1 4.2 - 6.1 % Final   10/22/2020 8.2 (A) 4.2 - 6.1 % Final       Today's Vitals: There were no vitals taken for this visit. - telephone encounter  ----------------    I spent 40 minutes with this patient today. All changes were made via collaborative practice agreement with Dr. Canas. A copy of the visit note was provided to the patient's primary care provider.    The patient was mailed a summary of these recommendations.     Gerber AndersonD  Medication Therapy Management Pharmacist  Pager: [phone]      Telemedicine Visit Details  Type of service:  Telephone visit  Start Time: 12:30 PM  End Time: 1:10 PM  Originating Location (patient location): Home  Distant Location (provider location):  [facility]        Medication Therapy Recommendations  Type 2 diabetes mellitus without complication, unspecified whether long term insulin use (H)    Current Medication: Semaglutide,0.25 or 0.5MG/DOS, (OZEMPIC, 0.25 OR 0.5 MG/DOSE,) 2 MG/1.5ML SOPN   Rationale: Cannot afford medication product - Cost - Adherence   Recommendation: Referral to Service    Status: Patient Agreed - Adherence/Education

## 2021-05-17 ENCOUNTER — VIRTUAL VISIT (OUTPATIENT)
Dept: PHARMACY | Facility: PHYSICIAN GROUP | Age: 56
End: 2021-05-17

## 2021-05-17 DIAGNOSIS — E11.9 TYPE 2 DIABETES MELLITUS WITHOUT COMPLICATION, UNSPECIFIED WHETHER LONG TERM INSULIN USE (H): Primary | ICD-10-CM

## 2021-05-17 PROCEDURE — 99607 MTMS BY PHARM ADDL 15 MIN: CPT | Performed by: PHARMACIST

## 2021-05-17 PROCEDURE — 99606 MTMS BY PHARM EST 15 MIN: CPT | Performed by: PHARMACIST

## 2021-05-17 NOTE — PROGRESS NOTES
Medication Therapy Management (MTM) Encounter    ASSESSMENT:                            Medication Adherence/Access: {adherencechoices:791474}    ***: ***  ***: ***  ***: ***  ***: ***    PLAN:                            ***    Follow-up: No follow-ups on file.      SUBJECTIVE/OBJECTIVE:                          Monica Miguel is a 55 year old female called for a follow-up visit. She was referred to me from Kaylen Olsen PA-C.  Today's visit is a follow-up MTM visit from 2021.     Reason for visit: Follow up on diabetes, and if she is still taking Ozempic.    Allergies/ADRs: Reviewed in chart  Tobacco: She reports that she has quit smoking. She does not have any smokeless tobacco history on file.  Alcohol: not currently using  Caffeine: no caffeine  Activity: Little activity right now  Past Medical History: Reviewed in chart    Medication Adherence/Access:  Cost is a concern, she is in the donut hold right now with Medicare.  The cost of her insulin and Ozempic went up.  At our last apt I sent her a program where she could get Ozempic and Lantus insulin for free (instead of levemir insulin).  She is thinking about it, and if she wants to apply for it.    Type 2 Diabetes:    Currently taking Pioglitazone 45 mg daily in the morning, Levemir 55 units daily at bedtime,   Hasn't been taking Ozempic, she had a higher copay.  She will either sign up for the assistance program or will purchase Ozempic from the pharmacy.    Patient is not experiencing side effects.    Metformin ER - had GI upset, diarrhea.  Pt has chronic edema long term, and possibly related to pioglitazone.  Pt said the edema is a lot better.  Only wearing the compression socks a couple times a week if on her feet.  Was on glipizide ER as well, unclear why or when she stopped this.  Blood sugar monitorin-2 time(s) daily. Ranges (patient reported):   Am fasting: didn't recall any recent numbers, but thinks they have been increasing  Symptoms of low  "blood sugar? shaky, dizzy, weak, sweaty, Frequency of lows- none since adding Ozempic  Symptoms of high blood sugar? none  Eye exam: not discussed today  Foot exam:not discussed today  Diet/Exercise: little activity right now, just got home from the hospital from having COVID.  Aspirin:  taking aspirin 81 mg daily.  Pt was on this in the past but she restarted it due to recent COVID.     Statin: not taking due to pt's concern regarding possible pregnancy.  Discussed with patient today, and she wouldn't want to start one right now.  ACEi/ARB: not taking due to pt's concern regarding possible pregnancy.  She thinks she missed her last two periods (last period Nov 22nd), she is noticing some night sweats at times and is possibly thinking she may be going into menopause.  She doesn't think she is pregnant.  Urine Albumin: WNL, UTD.  See in eCW.           Hemoglobin A1C   Date Value Ref Range Status   04/09/2021 6.1 4.2 - 6.1 % Final   10/22/2020 8.2 (A) 4.2 - 6.1 % Final        Today's Vitals: There were no vitals taken for this visit. - telephone encounter  ----------------    I spent {[facility] total time 3:187025} with this patient today{MTMpartdbillingquestion:635798}. { :233994}. A copy of the visit note was provided to the patient's {[facility] chart:868141} provider.    The patient {GIVEN/NOT GIVEN:118753::\"was given\"} a summary of these recommendations. {covisit:062553}    ***    Telemedicine Visit Details  Type of service:  {telemedvisitmtm:501375::\"Telephone visit\"}  Start Time: {video/phone visit start time:152948}  End Time: {video/phone visit end time:152948}  Originating Location (patient location): Home  Distant Location (provider location):  [facility]    {Click at end of visit to add-in MTP:937388}      "

## 2021-05-17 NOTE — PROGRESS NOTES
Medication Therapy Management (MTM) Encounter    ASSESSMENT:                            Medication Adherence/Access: See below for considerations    Type 2 Diabetes: Patient is meeting A1c goal of < 7%. Self monitoring of blood glucose is at goal of fasting  mg/dL. Patient would benefit from minimum SMBG: Check blood sugars fasting, and occasionally 2 hours after starting a meal. Would be beneficial for patient to stay on Ozempic since it has helped with getting A1c to goal and had cardiovascular benefits.   Patient would likely qualify to get Ozempic and Latnus for free from the drug company. She stll isn't sure if she plans to just keep getting meds from pharmacy, because she will be out of the donut hole in about 2 months if she did.  Otherwise she could apply to get the Lantus and Ozempic from the assistance program, and if approved that would help with cost.  She will either fill out the application and return it, or will go ahead and get Ozempic from the pharmacy.    PLAN:                            1.  Recommend either filling out the application to get Ozempic and Lantus for free, or can continue purchasing Ozempic from pharmacy.      Please let Kaylen or myself know which way you plan to go.  We want to make sure your blood sugars don't increase further.    Follow-up: Return in about 3 months (around 8/17/2021) for Diabetes Follow Up, MTM Follow Up.      SUBJECTIVE/OBJECTIVE:                          Monica Miguel is a 55 year old female called for a follow-up visit. She was referred to me from Kaylen Olsen PA-C.  Today's visit is a follow-up MTM visit from 4/30/2021.     Reason for visit: Follow up on diabetes, and if she is still taking Ozempic.    Allergies/ADRs: Reviewed in chart  Tobacco: She reports that she has quit smoking. She does not have any smokeless tobacco history on file.  Alcohol: not currently using  Caffeine: no caffeine  Activity: Little activity right now  Past Medical History:  Reviewed in chart    Medication Adherence/Access:  Cost is a concern, she is in the donut hold right now with Medicare.  The cost of her insulin and Ozempic went up.  At our last apt I sent her a program where she could get Ozempic and Lantus insulin for free (instead of levemir insulin).  She is thinking about it, and if she wants to apply for it.    Type 2 Diabetes:    Currently taking Pioglitazone 45 mg daily in the morning, Levemir 55 units daily at bedtime,   Hasn't been taking Ozempic, she had a higher copay and hasn't picked up the med yet.  She also hasn't applied for the assistance program.  Bolus insulin and other GLP-1 agonists would be similarly expensive.  She will either sign up for the assistance program or will purchase Ozempic from the pharmacy.    Patient is not experiencing side effects.    Metformin ER - had GI upset, diarrhea.  Pt has chronic edema long term, and possibly related to pioglitazone.  Pt said the edema is a lot better.  Only wearing the compression socks a couple times a week if on her feet.  Was on glipizide ER as well, unclear why or when she stopped this.  Blood sugar monitorin-2 time(s) daily. Ranges (patient reported):   Am fasting: didn't recall any recent numbers, but thinks they have been increasing  Symptoms of low blood sugar? shaky, dizzy, weak, sweaty, Frequency of lows- none since adding Ozempic  Symptoms of high blood sugar? none  Eye exam: not discussed today  Foot exam:not discussed today  Diet/Exercise: little activity right now, just got home from the hospital from having COVID.  Aspirin:  taking aspirin 81 mg daily.  Pt was on this in the past but she restarted it due to recent COVID.     Statin: not taking due to pt's concern regarding possible pregnancy.  Discussed with patient today, and she wouldn't want to start one right now.  ACEi/ARB: not taking due to pt's concern regarding possible pregnancy.  She thinks she missed her last two periods (last period  Nov 22nd), she is noticing some night sweats at times and is possibly thinking she may be going into menopause.  She doesn't think she is pregnant.  Urine Albumin: WNL, UTD.  See in eCW.           Hemoglobin A1C   Date Value Ref Range Status   04/09/2021 6.1 4.2 - 6.1 % Final   10/22/2020 8.2 (A) 4.2 - 6.1 % Final        Today's Vitals: There were no vitals taken for this visit. - telephone encounter  ----------------    I spent 38 minutes with this patient today. I offer these suggestions for consideration by Kaylen Olsen. A copy of the visit note was provided to the patient's primary care provider.    The patient declined a summary of these recommendations.     Gerber AndersonD  Medication Therapy Management Pharmacist  Pager: [phone]    Telemedicine Visit Details  Type of service:  Telephone visit  Start Time: 2:30 PM  End Time: 3:08 PM  Originating Location (patient location): Home  Distant Location (provider location):  [facility]      Medication Therapy Recommendations  No medication therapy recommendations to display

## 2021-05-19 NOTE — PATIENT INSTRUCTIONS
Recommendations from today's MTM visit:                                                    MTM (medication therapy management) is a service provided by a clinical pharmacist designed to help you get the most of out of your medicines.   Today we reviewed what your medicines are for, how to know if they are working, that your medicines are safe and how to make your medicine regimen as easy as possible.      1.  Recommend either filling out the application to get Ozempic and Lantus for free, or can continue purchasing Ozempic from pharmacy.       Please let Kaylen or myself know which way you plan to go.  We want to make sure your blood sugars don't increase further.     Follow-up: Return in about 3 months (around 8/17/2021) for Diabetes Follow Up, MTM Follow Up.    It was great to speak with you today.  I value your experience and would be very thankful for your time with providing feedback on our clinic survey. You may receive a survey via email or text message in the next few days.     To schedule another MTM appointment, please call the clinic directly or you may call the MTM scheduling line at [phone] or toll-free at [phone].     My Clinical Pharmacist's contact information:                                                      Please feel free to contact me with any questions or concerns you have.      Sherlyn Love PharmD  Medication Therapy Management Pharmacist  Pager: [phone]

## 2021-05-25 ENCOUNTER — RECORDS - HEALTHEAST (OUTPATIENT)
Dept: ADMINISTRATIVE | Facility: CLINIC | Age: 56
End: 2021-05-25

## 2021-07-09 ENCOUNTER — VIRTUAL VISIT (OUTPATIENT)
Dept: PHARMACY | Facility: PHYSICIAN GROUP | Age: 56
End: 2021-07-09

## 2021-07-09 DIAGNOSIS — E11.9 TYPE 2 DIABETES MELLITUS WITHOUT COMPLICATION, UNSPECIFIED WHETHER LONG TERM INSULIN USE (H): Primary | ICD-10-CM

## 2021-07-09 PROCEDURE — 99606 MTMS BY PHARM EST 15 MIN: CPT | Performed by: PHARMACIST

## 2021-07-09 PROCEDURE — 99607 MTMS BY PHARM ADDL 15 MIN: CPT | Performed by: PHARMACIST

## 2021-07-09 NOTE — PATIENT INSTRUCTIONS
"Recommendations from today's MTM visit:                                                    MTM (medication therapy management) is a service provided by a clinical pharmacist designed to help you get the most of out of your medicines.   Today we reviewed what your medicines are for, how to know if they are working, that your medicines are safe and how to make your medicine regimen as easy as possible.      1.  Continue current medications.  We can wait to see how your A1c is before we increase your insulin dose or discuss restarting Ozempic.    2.  You would qualify for the patient assistance program to get Ozempic and Lantus insulin for free.  If your income (yours plus your 's) is below $69,680 per year you qualify.  I would recommend applying for this program because it will help significantly with cost issues.  I included another copy of this application.  This program is separate from the \"donut hole\" issue with your insurance.  Even if you are in the donut hole with your insurance you can apply for this program.      Follow-up: 07/30/2021 at 3:30 PM, telephone follow up    It was great to speak with you today.  I value your experience and would be very thankful for your time with providing feedback on our clinic survey. You may receive a survey via email or text message in the next few days.     To schedule another MTM appointment, please call the clinic directly or you may call the MTM scheduling line at [phone] or toll-free at [phone].     My Clinical Pharmacist's contact information:                                                      Please feel free to contact me with any questions or concerns you have.      Sherlyn Love, Jodie  Medication Therapy Management Pharmacist  Pager: [phone]          What are the differences between the Medicare Coverage Phases?    Deductible Phase  Until you meet your yearly Part D deductible, you will pay full price for your covered prescriptions. " "Once the annual deductible is met the plan will begin to cover the cost of your drugs based on the plans prescription drug benefits. While deductibles can vary from plan to plan, no plans deductible can be higher than $445 in 2021, and some plans have no deductible.    Before Gap, or Initial Coverage Phase  In this phase, you will either pay a copay or coinsurance (a percentage of the drug s cost) when you have a prescription filled. This phase lasts until you and your plan reach a total of $4,130 in 2021 drug spending, at which time you move into the Coverage Gap phase.    During Gap, or Coverage Gap Phase  This phase is commonly referred to as the \"Donut Hole.\" It occurs after you and your plan reach $4,130 in 2021 in drug spending. During the Coverage Gap phase, you are usually responsible for paying a higher portion of the drug cost. After your true out-of-pocket costs (TrOOP) reach a total of $6,550 in 2021, you move into the Catastrophic Coverage phase. Out-of-pocket costs include your annual deductible as well as your copayments or coinsurance. Premiums do not count towards out-of-pocket costs.  Once you are in the donut hole what you pay on medications (25%) plus what the drug company pays (the other 75% of medication cost) reaches another $2,420 then you go into the Catastrophic coverage.    Note: Some plans provide additional coverage in the Coverage Gap, which can lower your share of the cost for drugs during this phase.    After Gap, or Catastrophic Coverage Phase  After you reach a total of $6,550 in 2021 in out-of-pocket prescription drug expenses, you will start paying a different copay or coinsurance for both generic and brand-name prescription drugs. In the Catastrophic Coverage phase, copays are typically lower than during the Initial Coverage phase. This phase lasts until the end of the plan year.          "

## 2021-07-09 NOTE — PROGRESS NOTES
Medication Therapy Management (MTM) Encounter    ASSESSMENT:                            Medication Adherence/Access: See below for considerations     Type 2 Diabetes: Patient is meeting A1c goal of < 7%. Self monitoring of blood glucose is at goal of fasting  mg/dL. Patient would benefit from minimum SMBG: Check blood sugars fasting, and occasionally 2 hours after starting a meal. Since her cristiano BG have been within goal about 1/2 of the time, would be reasonable to recheck her A1c and then consider restarting ozempic or titrating her insulin dose more.   Patient would qualify to get Ozempic and Lantus for free from the drug company. We discussed this at our last appointment and I mailed her the application, but she didn't apply for it.  She also didn't continue getting the Ozempic from the pharmacy.  She is in the donut hole, but will likely be out of it after a few months of getting her insulin and Ozempic through her insurance.  Patient declines aspirin, statin, and ace-inhibitor for renal protection due to trying to have a baby if able to.  She would like to wait until at least 1 year after going through menopause before restarting aspirin and statin.    PLAN:                            1.  Continue current medications.  Would like to see how her A1c is off the Ozempic, and if significantly increased will consider restarting it (will mail her patient assistance program application if that happens).  2.  Due for A1c check, helped her schedule follow up with Kaylen Olsen.    Patient said she wants the aspirin taken of her list, isn't taking it.    Follow-up: 07/30/2021 at 3:30 PM, telephone follow up    SUBJECTIVE/OBJECTIVE:                          Monica Miguel is a 55 year old female called for a follow-up visit. She was referred to me from Kaylen Olsen.  Today's visit is a follow-up MTM visit from 5/17/21.     Reason for visit: Follow up on diabetes, and if she is still taking  Ozempic.     Allergies/ADRs: Reviewed in chart  Tobacco: She reports that she has quit smoking. She does not have any smokeless tobacco history on file.  Alcohol: not currently using  Caffeine: no caffeine  Activity: Little activity right now  Past Medical History: Reviewed in chart     Medication Adherence/Access:  Cost is a concern, she is in the donut hole right now with Medicare.  The cost of her insulin and Ozempic went up.  At our last apt I sent her the application for pt assistance program where she could get Ozempic and Lantus insulin for free (instead of levemir insulin).  She has not applied for this, sent it to her twice.  She would qualify to get both for free based on income.       Type 2 Diabetes:    Currently taking Pioglitazone 45 mg daily in the morning, Levemir 55 units daily at bedtime,   Hasn't been taking Ozempic, when we last spoke she said she would stay on Ozempic.  Would either apply for the patient assistant program or get from the pharmacy.  She should be out of the donut hold within about 2-3 months of paying for her copays. Patient is not experiencing side effects.    Metformin ER - had GI upset, diarrhea.  Blood sugar monitorin-2 time(s) daily. Ranges (patient reported):   Am fastin, 155, 122, 118, 185, 131, 148, 155  PM- 172  7 day Average- 165  14 day average- 154  Symptoms of low blood sugar? shaky, dizzy, weak, sweaty, Frequency of lows- Symptoms of high blood sugar? none  Eye exam: not discussed today  Foot exam:not discussed today  Diet/Exercise: little activity right now, just got home from the hospital from having COVID.  Aspirin:  taking aspirin 81 mg daily, she stopped taking it.  She said she and her  and 'open to life' and hoping to have a baby.  She said if it happens it will. She doesn't want to take aspirin, statin, or ACE-Inhibitor.  Statin: not taking due to pt's concern regarding possible pregnancy.  Discussed with patient today, and she wouldn't want  to start one right now.  ACEi/ARB: not taking due to pt's concern regarding possible pregnancy.  She thinks she missed her last two periods (last period Nov 22nd), she is noticing some night sweats at times and is possibly thinking she may be going into menopause.  She doesn't think she is pregnant.  Urine Albumin: WNL, UTD.  See in eCW.     Hemoglobin A1C   Date Value Ref Range Status   04/09/2021 6.1 4.2 - 6.1 % Final   10/22/2020 8.2 (A) 4.2 - 6.1 % Final     Today's Vitals: There were no vitals taken for this visit. - telephone encounter  ----------------      I spent 45 minutes with this patient today. All changes were made via collaborative practice agreement with Dr. Canas. A copy of the visit note was provided to the patient's primary care provider.    The patient was mailed a summary of these recommendations.     Gerber AndersonD  Medication Therapy Management Pharmacist  Pager: [phone]      Telemedicine Visit Details  Type of service:  Telephone visit  Start Time: 2:30 PM  End Time: 3:15 PM  Originating Location (patient location): Home  Distant Location (provider location):  [facility]        Medication Therapy Recommendations  Type 2 diabetes mellitus without complication, unspecified whether long term insulin use (H)    Current Medication: insulin detemir (LEVEMIR FLEXTOUCH) 100 UNIT/ML pen   Rationale: Medication requires monitoring - Needs additional monitoring   Recommendation: Order Lab   Status: Accepted per CPA   Note: A1c due

## 2021-07-13 ENCOUNTER — RECORDS - HEALTHEAST (OUTPATIENT)
Dept: ADMINISTRATIVE | Facility: CLINIC | Age: 56
End: 2021-07-13

## 2021-07-19 ENCOUNTER — TRANSFERRED RECORDS (OUTPATIENT)
Dept: HEALTH INFORMATION MANAGEMENT | Facility: CLINIC | Age: 56
End: 2021-07-19

## 2021-07-19 LAB — HBA1C MFR BLD: 7.5 % (ref 4.2–6.1)

## 2021-07-21 ENCOUNTER — RECORDS - HEALTHEAST (OUTPATIENT)
Dept: ADMINISTRATIVE | Facility: CLINIC | Age: 56
End: 2021-07-21

## 2021-07-30 ENCOUNTER — VIRTUAL VISIT (OUTPATIENT)
Dept: PHARMACY | Facility: PHYSICIAN GROUP | Age: 56
End: 2021-07-30

## 2021-07-30 VITALS — SYSTOLIC BLOOD PRESSURE: 122 MMHG | DIASTOLIC BLOOD PRESSURE: 68 MMHG

## 2021-07-30 DIAGNOSIS — E11.9 TYPE 2 DIABETES MELLITUS WITHOUT COMPLICATION, UNSPECIFIED WHETHER LONG TERM INSULIN USE (H): Primary | ICD-10-CM

## 2021-07-30 PROCEDURE — 99606 MTMS BY PHARM EST 15 MIN: CPT | Performed by: PHARMACIST

## 2021-07-30 PROCEDURE — 99607 MTMS BY PHARM ADDL 15 MIN: CPT | Performed by: PHARMACIST

## 2021-07-30 RX ORDER — GLIPIZIDE 5 MG/1
5 TABLET, FILM COATED, EXTENDED RELEASE ORAL
COMMUNITY
End: 2021-12-03

## 2021-07-30 NOTE — PATIENT INSTRUCTIONS
Recommendations from today's MTM visit:                                                    MTM (medication therapy management) is a service provided by a clinical pharmacist designed to help you get the most of out of your medicines.   Today we reviewed what your medicines are for, how to know if they are working, that your medicines are safe and how to make your medicine regimen as easy as possible.      1.  Start Glipizide ER 5 mg once daily in the morning before breakfast.  2.  We discussed blood sugar goals:  The morning fasting blood sugar goal is to be below 130 mg/dL  The blood sugar goal for 2 hours after meals is to be below 180 mg/dL  We would like to keep your A1c below 7%    Follow-up: No follow-ups on file.    It was great to speak with you today.  I value your experience and would be very thankful for your time with providing feedback on our clinic survey. You may receive a survey via email or text message in the next few days.     To schedule another MTM appointment, please call the clinic directly or you may call the MTM scheduling line at [phone] or toll-free at [phone].     My Clinical Pharmacist's contact information:                                                      Please feel free to contact me with any questions or concerns you have.      Sherlyn Love, Jodie  Medication Therapy Management Pharmacist  Pager: [phone]

## 2021-07-30 NOTE — PROGRESS NOTES
Medication Therapy Management (MTM) Encounter    ASSESSMENT:                            Medication Adherence/Access: See below for considerations    Type 2 Diabetes: Patient is meeting not A1c goal of < 7%, increase about 1.5% since stopping Ozempic. Self monitoring of blood glucose is not at goal of fasting  mg/dL. Her BG and A1c went up since stopping Ozempic.  She will be unable to meet with me to fill out the assistance program until September due to a 3-4 week trip to [place] she leave for on Monday.    Roderick would benefit from adding a low dose of sulfonylurea for the next month while in [place] to prevent her BG from increasing further.  She said she can't buy the Ozempic due to the cost right now, but we will apply for the assistance program when I can help her fill out the application  Patient declines aspirin, statin, and ace-inhibitor for renal protection due to trying to have a baby if able to.  She would like to wait until at least 1 year after going through menopause before restarting aspirin and statin.    PLAN:                            1.  Start Glipizide ER 5 mg once daily in the morning before breakfast.  2.  We discussed blood sugar goals:  The morning fasting blood sugar goal is to be below 130 mg/dL  The blood sugar goal for 2 hours after meals is to be below 180 mg/dL  We would like to keep your A1c below 7%    Follow-up: Return in 1 month (on 9/1/2021) for MTM Follow Up, Help filling out paperwork.  1:00 PM.   Please bring in your tax documents from last year, 2020.  The application for Ozempic requires proof of your income.    SUBJECTIVE/OBJECTIVE:                          Monica Miguel is a 55 year old female called for a follow-up visit. She was referred to me from Kaylen Olsen.  Today's visit is a follow-up MTM visit from 7/9/2021     Reason for visit: Follow up on diabetes.  Her A1c went up since she stopped Ozempic, and we sent her the patient assistance program to apply to get it for  free.  We discussed the application, and she would like to meet with someone to help her fill it out.  She is going on a trip with her  for 3 weeks to [place] (leave Monday), and doesn't think she will be able to fill the application out until after she gets back     Allergies/ADRs: Reviewed in chart  Tobacco: She reports that she has quit smoking. She does not have any smokeless tobacco history on file.  Alcohol: not currently using  Caffeine: no caffeine  Activity: Little activity right now  Past Medical History: Reviewed in chart     Medication Adherence/Access:  Cost is a concern, she is in the donut hole right now with Medicare.  The cost of her insulin and Ozempic is higher in the donut hole  She hasn't been taking the Ozempic because the cost when she is in the donut hole is about $200 and she said she just can't afford it.      Type 2 Diabetes:    Currently taking Pioglitazone 45 mg daily in the morning, Levemir 55 units daily at bedtime.  Hasn't been taking Ozempic, when we last spoke she said she would stay on Ozempic.  Would either apply for the patient assistant program or get from the pharmacy.  She should be out of the donut hold within about 2-3 months of paying for her copays on both insulin and Ozempic but it's way too much.  The last few months we have discussed the application for the program at each follow up apt but she hasn't returned it to me or filled it out.  Discussed today and we will meet together to fill it out.   She is worried about her BG when in [place].  Knows she will be eating a lot more than usual, and lots of carbs.   Patient is not experiencing side effects.    Metformin ER - had GI upset, diarrhea.  Blood sugar monitorin-2 time(s) daily. Ranges (patient reported):   Am fastin, 180, 177, 230, 179, 125  Symptoms of low blood sugar? shaky, dizzy, weak, sweaty, Frequency of lows- Symptoms of high blood sugar? none  Eye exam: not discussed today  Foot exam:not  discussed today  Diet/Exercise: little activity right now, just got home from the hospital from having COVID.  Aspirin:  taking aspirin 81 mg daily, she stopped taking it.  She said she and her  and 'open to life' and hoping to have a baby.  She said if it happens it will. She doesn't want to take aspirin, statin, or ACE-Inhibitor.  Statin: not taking due to pt's concern regarding possible pregnancy.  Discussed with patient today, and she wouldn't want to start one right now.  ACEi/ARB: not taking due to pt's concern regarding possible pregnancy.  She thinks she missed her last two periods (last period Nov 22nd), she is noticing some night sweats at times and is possibly thinking she may be going into menopause.  She doesn't think she is pregnant.  Urine Albumin: WNL, UTD.  See in eCW.        Hemoglobin A1C   Date Value Ref Range Status   04/09/2021 6.1 4.2 - 6.1 % Final   12/23/2020 8.4 (H) <=5.6 % Final     Comment:     Prediabetes:   HBA1c       5.7 to 6.4%        Diabetes:        HBA1c        >=6.5%   Patients with Hgb F >5%, total bilirubin >10.0 mg/dL, abnormal red cell turnover, severe renal or hepatic disease or malignancy should not have this A1C method used to diagnose or monitor diabetes.         10/22/2020 8.2 (A) 4.2 - 6.1 % Final           Today's Vitals: There were no vitals taken for this visit. - phone apt  ----------------    I spent 30 minutes with this patient today. Medication changes were made today using the collaborative practice agreement with Dr. Canas. A copy of the visit note was provided to the patient's primary care provider.    The patient was mailed a summary of these recommendations.     Sherlyn Love PharmD  Medication Therapy Management Pharmacist  Pager: [phone]      Telemedicine Visit Details  Type of service:  Telephone visit  Start Time: 3:30 PM  End Time: 4:15 PM  Originating Location (patient location): [place]  Distant Location (provider location):  [place]  [facility]     Medication Therapy Recommendations  No medication therapy recommendations to display

## 2021-09-01 ENCOUNTER — OFFICE VISIT (OUTPATIENT)
Dept: PHARMACY | Facility: PHYSICIAN GROUP | Age: 56
End: 2021-09-01

## 2021-09-01 VITALS — HEART RATE: 79 BPM | DIASTOLIC BLOOD PRESSURE: 68 MMHG | SYSTOLIC BLOOD PRESSURE: 124 MMHG

## 2021-09-01 DIAGNOSIS — E11.9 TYPE 2 DIABETES MELLITUS WITHOUT COMPLICATION, UNSPECIFIED WHETHER LONG TERM INSULIN USE (H): Primary | ICD-10-CM

## 2021-09-01 PROCEDURE — 99607 MTMS BY PHARM ADDL 15 MIN: CPT | Performed by: PHARMACIST

## 2021-09-01 PROCEDURE — 99606 MTMS BY PHARM EST 15 MIN: CPT | Performed by: PHARMACIST

## 2021-09-01 NOTE — PROGRESS NOTES
Medication Therapy Management (MTM) Encounter    ASSESSMENT:                            Medication Adherence/Access: See below for considerations     Type 2 Diabetes: Patient is meeting not A1c goal of < 7%, increase about 1.5% since stopping Ozempic. Self monitoring of blood glucose is not at goal of fasting  mg/dL.   Help patient fill out the patient assistance program application to hopefully get Ozempic and insulin for free.  Would be beneficial to continue glipizide until we are able to get her Ozempic from the program.  When she is on the treatment dose of Ozempic 0.5 mg we can stop the glipizide.  Patient declines aspirin, statin, and ace-inhibitor for renal protection due to trying to have a baby if able to.  She would like to wait until at least 1 year after going through menopause before restarting aspirin and statin.      PLAN:                            1.  Helped patient fill out the Ozempic and Levemir patient assistance program.  Will leave on Kaylen's desk to review and sign.  Then it can be faxed to [phone] ([email or web address]).    2.  We will put patient on the lab schedule for pregnancy test per her request.    Follow-up: Return in about 3 months (around 12/1/2021).      SUBJECTIVE/OBJECTIVE:                          Monica Miguel is a 55 year old female coming in for a follow-up visit. She was referred to me from Kaylen Olsen.  Today's visit is a follow-up MTM visit from 7/30/2021     Reason for visit: Follow up on diabetes, and to help her fill out the application for assistance program for Ozempic. Patient requests a pregnancy test today because her last period was in May.     Allergies/ADRs: Reviewed in chart  Tobacco: She reports that she has quit smoking. She does not have any smokeless tobacco history on file.  Alcohol: not currently using  Caffeine: no caffeine  Activity: Little activity right now  Past Medical History: Reviewed in chart     Medication Adherence/Access:  Cost is a concern,  she is in the donut hole right now with Medicare.  The cost of her insulin and Ozempic is higher in the donut hole  She hasn't been taking the Ozempic because the cost when she is in the donut hole is about $200 and she said she just can't afford it.       Type 2 Diabetes:    Currently taking Pioglitazone 45 mg daily in the morning, Levemir 55 units daily at bedtime, and glipizide ER 5 mg daily.    At our last follow up we started glipizide ER short term to help with BG, since she wasn't able to restart Ozempic yet.  Her BG have increased since stopping it, and she needed help in person filling out the Ozempic PAP application.  Her blood sugars were a lot higher while she was in [place].  Reports that she was eating a lot more food in general and higher carb options. Patient is not experiencing side effects.    Metformin ER - had GI upset, diarrhea.  Blood sugar monitorin-2 time(s) daily. Ranges (patient reported):   Am fasting: mid 100's, and a few times in the 200's.    Symptoms of low blood sugar? shaky, dizzy, weak, sweaty, Frequency of lows- Symptoms of high blood sugar? none  Eye exam: not discussed today  Foot exam:not discussed today  Diet/Exercise: Is trying to increase activity   Aspirin: Was prescribed aspirin 81 mg daily, she stopped taking it.  She said she and her  and 'open to life' and hoping to have a baby.  She said if it happens it will. She doesn't want to take aspirin, statin, or ACE-Inhibitor until about 1 year after she goes through menopause.  Statin: not taking due to pt's concern regarding possible pregnancy.    ACEi/ARB: not taking due to pt's concern regarding possible pregnancy. Urine Albumin: WNL, UTD.  See in eCW.  Last A1c was 7.5%    Hemoglobin A1C   Date Value Ref Range Status   2021 6.1 4.2 - 6.1 % Final   2020 8.4 (H) <=5.6 % Final     Comment:     Prediabetes:   HBA1c       5.7 to 6.4%        Diabetes:        HBA1c        >=6.5%   Patients with Hgb F >5%,  total bilirubin >10.0 mg/dL, abnormal red cell turnover, severe renal or hepatic disease or malignancy should not have this A1C method used to diagnose or monitor diabetes.         10/22/2020 8.2 (A) 4.2 - 6.1 % Final         Today's Vitals: /68   Pulse 79   ----------------      I spent 60 minutes with this patient today. All changes were made via collaborative practice agreement with Naila Olsen PA-C. A copy of the visit note was provided to the patient's primary care provider.    The patient declined a summary of these recommendations.     Sherlyn Love, PharmD  Medication Therapy Management Pharmacist  Pager: [phone]         Medication Therapy Recommendations  Type 2 diabetes mellitus without complication, unspecified whether long term insulin use (H)    Current Medication: Semaglutide,0.25 or 0.5MG/DOS, (OZEMPIC, 0.25 OR 0.5 MG/DOSE,) 2 MG/1.5ML SOPN   Rationale: Cannot afford medication product - Cost - Adherence   Recommendation: Referral to Service    Status: Patient Agreed - Adherence/Education

## 2021-10-29 ENCOUNTER — LAB REQUISITION (OUTPATIENT)
Dept: LAB | Facility: CLINIC | Age: 56
End: 2021-10-29
Payer: MEDICARE

## 2021-10-29 ENCOUNTER — TRANSFERRED RECORDS (OUTPATIENT)
Dept: HEALTH INFORMATION MANAGEMENT | Facility: CLINIC | Age: 56
End: 2021-10-29

## 2021-10-29 DIAGNOSIS — E55.9 VITAMIN D DEFICIENCY, UNSPECIFIED: ICD-10-CM

## 2021-10-29 LAB — HBA1C MFR BLD: 7.3 % (ref 4.2–6.1)

## 2021-10-29 PROCEDURE — 82306 VITAMIN D 25 HYDROXY: CPT | Mod: ORL | Performed by: PHYSICIAN ASSISTANT

## 2021-10-30 LAB — DEPRECATED CALCIDIOL+CALCIFEROL SERPL-MC: 41 UG/L (ref 30–80)

## 2021-12-01 ENCOUNTER — VIRTUAL VISIT (OUTPATIENT)
Dept: PHARMACY | Facility: PHYSICIAN GROUP | Age: 56
End: 2021-12-01

## 2021-12-01 DIAGNOSIS — E11.9 TYPE 2 DIABETES MELLITUS WITHOUT COMPLICATION, UNSPECIFIED WHETHER LONG TERM INSULIN USE (H): Primary | ICD-10-CM

## 2021-12-01 PROCEDURE — 99607 MTMS BY PHARM ADDL 15 MIN: CPT | Performed by: PHARMACIST

## 2021-12-01 PROCEDURE — 99606 MTMS BY PHARM EST 15 MIN: CPT | Performed by: PHARMACIST

## 2021-12-01 NOTE — PROGRESS NOTES
Medication Therapy Management (MTM) Encounter    ASSESSMENT:                            Medication Adherence/Access: See below for considerations    Type 2 Diabetes: Patient is not meeting A1c goal of < 7%. Self monitoring of blood glucose is at goal of fasting  mg/dL and post prandial < 180 mg/dL. Patient would benefit from reducing her Levemir dose a few units to prevent BG from going much lower.     PLAN:                            1.  Decrease Levemir to 43 units once daily.    2.  When you need refills of the Levemir or Ozempic through the patient assistance program through [employer] make sure to request these refills about 3 weeks in advance.     I found out that the program next year won't require patients to spend a certain amount of money out of pocket before they can apply for the program.  So we should be able to re-apply for this program at the beginning of the year.      3.  I will send in a new prescription for the One touch ultra 2 meter.  If this is very expensive let us know.  I can try to find one of these monitors at a different clinic location to give to you.    Follow-up: Return in about 2 months (around 2/1/2022) for Diabetes Follow Up.      SUBJECTIVE/OBJECTIVE:                          Monica Miguel is a 56 year old female coming in for a follow-up visit. She was referred to me from Kaylen Olsen.  Today's visit is a follow-up MTM visit from 9/1/2021     Reason for visit: Follow up on diabetes.    Allergies/ADRs: Reviewed in chart  Past Medical History: Reviewed in chart  Tobacco: She reports that she has quit smoking. She does not have any smokeless tobacco history on file.  Alcohol: not currently using    Medication Adherence/Access: no issues reported  Getting Ozempic and Levemir through the patient assistance program    Type 2 Diabetes:    Currently taking Pioglitazone 45 mg daily in the morning, Levemir 46 units daily at bedtime, and Ozempic 0.5 mg once weekly.  Patient is not  experiencing side effects.  Has titrated back up to the 0.5 mg Ozempic dose and said her BG have been doing much better.  She had to reduce her Levemir doses a bit due to BG coming down  Metformin ER - had GI upset, diarrhea.  Blood sugar monitorin-2 time(s) daily. Ranges (patient reported):   Am fasting: low to mid 100's, rarely in the 200's since getting back on Ozempic.   Before meal- 84, 81, 108, 115, 82, 92, 87, 93  Symptoms of low blood sugar? shaky, dizzy, weak, sweaty, Frequency of lows- Symptoms of high blood sugar? none  Eye exam: not discussed today  Foot exam:not discussed today  Diet/Exercise: Is trying to increase activity   Aspirin: Was prescribed aspirin 81 mg daily, she stopped taking it.  She said she and her  and 'open to life' and hoping to have a baby.  She said if it happens it will. She doesn't want to take aspirin, statin, or ACE-Inhibitor until about 1 year after she goes through menopause.  Statin: not taking due to pt's concern regarding possible pregnancy.    ACEi/ARB: not taking due to pt's concern regarding possible pregnancy. Urine Albumin: WNL, UTD.  See in eCW.    Today's Vitals: /70   Pulse 70   ----------------  Post Discharge Medication Reconciliation Status: discharge medications reconciled and changed, per note/orders.    I spent 60 minutes with this patient today. All changes were made via collaborative practice agreement with Naila Olsen PA-C. A copy of the visit note was provided to the patient's primary care provider.    The patient was mailed a summary of these recommendations.     Sherlyn Love, PharmD  Medication Therapy Management Pharmacist  Pager: [phone]       Medication Therapy Recommendations  Type 2 diabetes mellitus without complication, unspecified whether long term insulin use (H)    Current Medication: insulin detemir (LEVEMIR FLEXTOUCH) 100 UNIT/ML pen   Rationale: Dose too high - Dosage too high - Safety   Recommendation:  Decrease Dose   Status: Accepted per CPA

## 2021-12-03 VITALS — SYSTOLIC BLOOD PRESSURE: 126 MMHG | DIASTOLIC BLOOD PRESSURE: 70 MMHG | HEART RATE: 70 BPM

## 2021-12-03 NOTE — PATIENT INSTRUCTIONS
Recommendations from today's MTM visit:                                                    MTM (medication therapy management) is a service provided by a clinical pharmacist designed to help you get the most of out of your medicines.   Today we reviewed what your medicines are for, how to know if they are working, that your medicines are safe and how to make your medicine regimen as easy as possible.      1.  Decrease Levemir to 43 units once daily.    2.  When you need refills of the Levemir or Ozempic through the patient assistance program through [employer] make sure to request these refills about 3 weeks in advance.     I found out that the program next year won't require patients to spend a certain amount of money out of pocket before they can apply for the program.  So we should be able to re-apply for this program at the beginning of the year.      3.  I will send in a new prescription for the One touch ultra 2 meter.  If this is very expensive let us know.  I can try to find one of these monitors at a different clinic location to give to you.    Follow-up: Return in about 2 months (around 2/1/2022) for Diabetes Follow Up.    It was great to speak with you today.  I value your experience and would be very thankful for your time with providing feedback on our clinic survey. You may receive a survey via email or text message in the next few days.     To schedule another MTM appointment, please call the clinic directly or you may call the MTM scheduling line at [phone] or toll-free at [phone].     My Clinical Pharmacist's contact information:                                                      Please feel free to contact me with any questions or concerns you have.      Sherlyn Love, Jodie  Medication Therapy Management Pharmacist  Pager: [phone]

## 2022-02-09 ENCOUNTER — LAB REQUISITION (OUTPATIENT)
Dept: LAB | Facility: CLINIC | Age: 57
End: 2022-02-09
Payer: MEDICARE

## 2022-02-09 ENCOUNTER — TRANSFERRED RECORDS (OUTPATIENT)
Dept: HEALTH INFORMATION MANAGEMENT | Facility: CLINIC | Age: 57
End: 2022-02-09

## 2022-02-09 DIAGNOSIS — E78.5 HYPERLIPIDEMIA, UNSPECIFIED: ICD-10-CM

## 2022-02-09 DIAGNOSIS — E11.40 TYPE 2 DIABETES MELLITUS WITH DIABETIC NEUROPATHY, UNSPECIFIED (H): ICD-10-CM

## 2022-02-09 LAB
ALBUMIN SERPL-MCNC: 3.5 G/DL (ref 3.5–5)
ALP SERPL-CCNC: 98 U/L (ref 45–120)
ALT SERPL W P-5'-P-CCNC: 12 U/L (ref 0–45)
ANION GAP SERPL CALCULATED.3IONS-SCNC: 12 MMOL/L (ref 5–18)
AST SERPL W P-5'-P-CCNC: 12 U/L (ref 0–40)
BILIRUB SERPL-MCNC: 0.2 MG/DL (ref 0–1)
BUN SERPL-MCNC: 16 MG/DL (ref 8–22)
CALCIUM SERPL-MCNC: 9.2 MG/DL (ref 8.5–10.5)
CHLORIDE BLD-SCNC: 104 MMOL/L (ref 98–107)
CHOLEST SERPL-MCNC: 199 MG/DL
CO2 SERPL-SCNC: 22 MMOL/L (ref 22–31)
CREAT SERPL-MCNC: 0.71 MG/DL (ref 0.6–1.1)
CREAT UR-MCNC: 40 MG/DL
FASTING STATUS PATIENT QL REPORTED: ABNORMAL
GFR SERPL CREATININE-BSD FRML MDRD: >90 ML/MIN/1.73M2
GLUCOSE BLD-MCNC: 130 MG/DL (ref 70–125)
HBA1C MFR BLD: 6.5 % (ref 4.2–6.1)
HDLC SERPL-MCNC: 41 MG/DL
LDLC SERPL CALC-MCNC: 119 MG/DL
MICROALBUMIN UR-MCNC: 6.75 MG/DL (ref 0–1.99)
MICROALBUMIN/CREAT UR: 168.8 MG/G CR
POTASSIUM BLD-SCNC: 5.1 MMOL/L (ref 3.5–5)
PROT SERPL-MCNC: 6.8 G/DL (ref 6–8)
SODIUM SERPL-SCNC: 138 MMOL/L (ref 136–145)
TRIGL SERPL-MCNC: 193 MG/DL

## 2022-02-09 PROCEDURE — 80061 LIPID PANEL: CPT | Mod: ORL | Performed by: PHYSICIAN ASSISTANT

## 2022-02-09 PROCEDURE — 82043 UR ALBUMIN QUANTITATIVE: CPT | Mod: ORL | Performed by: PHYSICIAN ASSISTANT

## 2022-02-09 PROCEDURE — 80053 COMPREHEN METABOLIC PANEL: CPT | Mod: ORL | Performed by: PHYSICIAN ASSISTANT

## 2022-07-13 ENCOUNTER — ANCILLARY PROCEDURE (OUTPATIENT)
Dept: MAMMOGRAPHY | Facility: CLINIC | Age: 57
End: 2022-07-13
Attending: PHYSICIAN ASSISTANT
Payer: MEDICARE

## 2022-07-13 DIAGNOSIS — Z12.31 VISIT FOR SCREENING MAMMOGRAM: ICD-10-CM

## 2022-07-13 PROCEDURE — 77067 SCR MAMMO BI INCL CAD: CPT

## 2022-09-30 ENCOUNTER — TRANSFERRED RECORDS (OUTPATIENT)
Dept: HEALTH INFORMATION MANAGEMENT | Facility: CLINIC | Age: 57
End: 2022-09-30

## 2022-09-30 LAB — HBA1C MFR BLD: 6.8 % (ref 4.2–6.1)

## 2023-01-06 ENCOUNTER — APPOINTMENT (OUTPATIENT)
Dept: RADIOLOGY | Facility: HOSPITAL | Age: 58
End: 2023-01-06
Attending: EMERGENCY MEDICINE
Payer: MEDICARE

## 2023-01-06 VITALS
BODY MASS INDEX: 46.65 KG/M2 | WEIGHT: 279.98 LBS | RESPIRATION RATE: 18 BRPM | TEMPERATURE: 98.6 F | HEART RATE: 84 BPM | HEIGHT: 65 IN | OXYGEN SATURATION: 96 % | SYSTOLIC BLOOD PRESSURE: 136 MMHG | DIASTOLIC BLOOD PRESSURE: 62 MMHG

## 2023-01-06 PROCEDURE — 73030 X-RAY EXAM OF SHOULDER: CPT | Mod: RT

## 2023-01-06 PROCEDURE — 71101 X-RAY EXAM UNILAT RIBS/CHEST: CPT | Mod: RT

## 2023-01-06 PROCEDURE — 99284 EMERGENCY DEPT VISIT MOD MDM: CPT

## 2023-01-07 ENCOUNTER — HOSPITAL ENCOUNTER (EMERGENCY)
Facility: HOSPITAL | Age: 58
Discharge: HOME | End: 2023-01-07
Attending: FAMILY MEDICINE | Admitting: FAMILY MEDICINE
Payer: MEDICARE

## 2023-01-07 DIAGNOSIS — S20.211A CONTUSION OF RIGHT CHEST WALL, INITIAL ENCOUNTER: ICD-10-CM

## 2023-01-07 DIAGNOSIS — S80.01XA CONTUSION OF RIGHT KNEE, INITIAL ENCOUNTER: ICD-10-CM

## 2023-01-07 NOTE — ED TRIAGE NOTES
Pt slipped on ice and fell forward hitting chest with left arm forward. Pt c/o chest pain,  Left arm/shoulder pain and left ribs area pain. No LOC, not on thinners     Triage Assessment     Row Name 01/06/23 2225       Triage Assessment (Adult)    Airway WDL WDL       Respiratory WDL    Respiratory WDL WDL       Skin Circulation/Temperature WDL    Skin Circulation/Temperature WDL WDL       Cardiac WDL    Cardiac WDL WDL       Peripheral/Neurovascular WDL    Peripheral Neurovascular WDL WDL       Cognitive/Neuro/Behavioral WDL    Cognitive/Neuro/Behavioral WDL WDL

## 2023-01-07 NOTE — DISCHARGE INSTRUCTIONS
Take Tylenol and ibuprofen as needed for pain    Ice 15 to 20 minutes at a time every 2-3 hours while awake for 24 hours, then ice or heat for comfort    Activity as tolerated

## 2023-01-07 NOTE — ED PROVIDER NOTES
EMERGENCY DEPARTMENT ENCOUNTER      NAME: Monica Miguel  AGE: 57 year old female  YOB: 1965  MRN: [id number]  EVALUATION DATE & TIME: No admission date for patient encounter.    PCP: Naila Olsen    ED PROVIDER: Skyler Boo M.D.    Chief Complaint   Patient presents with     Fall     Fall forward, hit chest and left shoulder/pain       FINAL IMPRESSION:  1. Contusion of right knee, initial encounter    2. Contusion of right chest wall, initial encounter        ED COURSE & MEDICAL DECISION MAKING:    Pertinent Labs & Imaging studies independently interpreted by me. (See chart for details)  12:18 AM Patient seen and examined, external records reviewed.  Patient presents after slip and fall.  She landed on her right knee, tenderness at the tip of the patella but no blood staining on the pants, ambulating without difficulty, patellar fracture is clinically unlikely.  Also complaining of right rib and central chest pain and says she feel like she hit her chest.  Some tenderness on exam, lungs are clear, x-ray does not demonstrate pneumothorax, hemothorax, or bony injury.  Discussed care including gentle stretching, Tylenol and ibuprofen, ice and heat and patient is stable for discharge.      At the conclusion of the encounter I discussed the results of all of the tests and the disposition. The questions were answered. The patient or family acknowledged understanding and was agreeable with the care plan.     Medical Decision Making    History:    Supplemental history from: Documented in HPI, if applicable    External Record(s) reviewed: Documented in HPI, if applicable.    Work Up:    Chart documentation includes differential considered and any EKGs or imaging independently interpreted by provider.    In additional to work up documented, I considered the following work up: Imaging CT, but deferred due to not indicated by [place] CT rule, chest CT not clinically indicated.    External  consultation:    Discussion of management with another provider: See chart documentation, if applicable    Complicating factors:    Care impacted by chronic illness: Diabetes, Hyperlipidemia and Hypertension    Care affected by social determinants of health: N/A    Disposition considerations: Discharge. No recommendations on prescription strength medication(s). Admission consideration documented above, if applicable.        PROCEDURES:       MEDICATIONS GIVEN IN THE EMERGENCY:  Medications - No data to display    NEW PRESCRIPTIONS STARTED AT TODAY'S ER VISIT  New Prescriptions    No medications on file       =================================================================    HPI    Patient information was obtained from: Patient      Monica Miguel is a 57 year old female with a pertinent history of type 2 diabetes mellitus, HTN, HLD, who presents to this ED by private car for evaluation of a fall.    Patient states she slipped and fell on an icy sidewalk at 5:30pm this afternoon. States she landed on her chest and right knee. Endorses pain to her right knee and central chest. States she also partially landed on her outstretched right arm. States she did not hit her head. States she is right-handed. States she has a history of diabetes on insulin. Reports she is allergic to penicillin. Denies any other medical complaint at this time.       REVIEW OF SYSTEMS   Review of Systems   Musculoskeletal:        Positive for right knee and right arm pain. Positive for sternal pain.      All other systems reviewed and negative    PAST MEDICAL HISTORY:  Past Medical History:   Diagnosis Date     Atopic eczema      Bipolar 1 disorder (H)      COVID-19 12/22/2020     Diabetes mellitus, type II (H)      GERD (gastroesophageal reflux disease)      Hyperlipidemia      Hypertension      Liver disease      Obesity      Schizophrenia (H)      Sleep apnea        PAST SURGICAL HISTORY:  Past Surgical History:   Procedure Laterality Date      "ANKLE FRACTURE SURGERY         CURRENT MEDICATIONS:    No current facility-administered medications for this encounter.     Current Outpatient Medications   Medication     acetaminophen (TYLENOL) 500 MG tablet     Acetylcarnitine HCl (ACETYL-L-CARNITINE HCL) POWD     Blood Glucose Monitoring Suppl (BLOOD GLUCOSE MONITOR SYSTEM) w/Device KIT     cholecalciferol (VITAMIN D3) 1250 mcg (37059 units) capsule     cloZAPine (CLOZARIL) 25 MG tablet     ferrous sulfate (FEROSUL) 325 (65 Fe) MG tablet     furosemide (LASIX) 20 MG tablet     gabapentin (NEURONTIN) 600 MG tablet     insulin detemir (LEVEMIR FLEXTOUCH) 100 UNIT/ML pen     OLANZapine (ZYPREXA) 15 MG tablet     pioglitazone (ACTOS) 45 MG tablet     Semaglutide,0.25 or 0.5MG/DOS, (OZEMPIC, 0.25 OR 0.5 MG/DOSE,) 2 MG/1.5ML SOPN     traZODone (DESYREL) 100 MG tablet     venlafaxine (EFFEXOR-XR) 150 MG 24 hr capsule     zolpidem (AMBIEN) 5 MG tablet       ALLERGIES:  Allergies   Allergen Reactions     Other Environmental Allergy Unknown     pineapple     Penicillins Rash       FAMILY HISTORY:  Family History   Problem Relation Age of Onset     Brain Cancer Father      Pancreatic Cancer Maternal Grandmother      Brain Cancer Paternal Aunt        SOCIAL HISTORY:   Social History     Socioeconomic History     Marital status:    Tobacco Use     Smoking status: Former       VITALS:  /62   Pulse 84   Temp 98.6  F (37  C) (Oral)   Resp 18   Ht 1.651 m (5' 5\")   Wt 127 kg (279 lb 15.8 oz)   SpO2 96%   BMI 46.59 kg/m      PHYSICAL EXAM:  Physical Exam  Vitals and nursing note reviewed.   Constitutional:       Appearance: Normal appearance.   HENT:      Head: Normocephalic and atraumatic.      Right Ear: External ear normal.      Left Ear: External ear normal.      Nose: Nose normal.      Mouth/Throat:      Mouth: Mucous membranes are moist.   Eyes:      Extraocular Movements: Extraocular movements intact.      Conjunctiva/sclera: Conjunctivae normal.      " Pupils: Pupils are equal, round, and reactive to light.   Cardiovascular:      Rate and Rhythm: Normal rate and regular rhythm.   Pulmonary:      Effort: Pulmonary effort is normal.      Breath sounds: Normal breath sounds. No wheezing or rales.   Abdominal:      General: Abdomen is flat. There is no distension.      Palpations: Abdomen is soft.      Tenderness: There is no abdominal tenderness. There is no guarding.   Musculoskeletal:         General: Normal range of motion.      Cervical back: Normal range of motion and neck supple.      Right lower leg: No edema.      Left lower leg: No edema.      Comments: Tenderness at inferior pole of the right patella. Sternal tenderness. Right lateral inferior chest wall tenderness.    Lymphadenopathy:      Cervical: No cervical adenopathy.   Skin:     General: Skin is warm and dry.   Neurological:      General: No focal deficit present.      Mental Status: She is alert and oriented to person, place, and time. Mental status is at baseline.      Comments: No gross focal neurologic deficits   Psychiatric:         Mood and Affect: Mood normal.         Behavior: Behavior normal.         Thought Content: Thought content normal.          LAB:  All pertinent labs reviewed and interpreted.  Results for orders placed or performed during the hospital encounter of 01/06/23   XR Ribs & Chest Right G/E 3 Views    Impression    IMPRESSION: The visualized heart and lungs are negative. No rib fractures.   XR Shoulder Right G/E 3 Views    Impression    IMPRESSION: Normal joint spaces and alignment. No fracture.     RADIOLOGY:  Reviewed all pertinent imaging. Please see official radiology report.  XR Ribs & Chest Right G/E 3 Views   Final Result   IMPRESSION: The visualized heart and lungs are negative. No rib fractures.      XR Shoulder Right G/E 3 Views   Final Result   IMPRESSION: Normal joint spaces and alignment. No fracture.        IQuentin, am serving as a scribe to document  services personally performed by Dr. Boo based on my observation and the provider's statements to me. I, Skyler Boo MD attest that Quentin Coyle is acting in a scribe capacity, has observed my performance of the services and has documented them in accordance with my direction.    Skyler Boo M.D.  Emergency Medicine  [facility] EMERGENCY DEPARTMENT  [address] [place] [id number] [phone]  Dept: [phone]     Skyler Boo MD  01/07/23 0039

## 2023-01-20 ENCOUNTER — TRANSFERRED RECORDS (OUTPATIENT)
Dept: HEALTH INFORMATION MANAGEMENT | Facility: CLINIC | Age: 58
End: 2023-01-20

## 2023-01-20 ENCOUNTER — LAB REQUISITION (OUTPATIENT)
Dept: LAB | Facility: CLINIC | Age: 58
End: 2023-01-20
Payer: MEDICARE

## 2023-01-20 DIAGNOSIS — E78.5 HYPERLIPIDEMIA, UNSPECIFIED: ICD-10-CM

## 2023-01-20 LAB
ALBUMIN SERPL BCG-MCNC: 4 G/DL (ref 3.5–5.2)
ALP SERPL-CCNC: 115 U/L (ref 35–104)
ALT SERPL W P-5'-P-CCNC: 20 U/L (ref 10–35)
ANION GAP SERPL CALCULATED.3IONS-SCNC: 14 MMOL/L (ref 7–15)
AST SERPL W P-5'-P-CCNC: 18 U/L (ref 10–35)
BILIRUB SERPL-MCNC: 0.2 MG/DL
BUN SERPL-MCNC: 15.9 MG/DL (ref 6–20)
CALCIUM SERPL-MCNC: 9.2 MG/DL (ref 8.6–10)
CHLORIDE SERPL-SCNC: 101 MMOL/L (ref 98–107)
CHOLEST SERPL-MCNC: 197 MG/DL
CREAT SERPL-MCNC: 0.65 MG/DL (ref 0.51–0.95)
DEPRECATED HCO3 PLAS-SCNC: 24 MMOL/L (ref 22–29)
GFR SERPL CREATININE-BSD FRML MDRD: >90 ML/MIN/1.73M2
GLUCOSE SERPL-MCNC: 98 MG/DL (ref 70–99)
HBA1C MFR BLD: 6.3 % (ref 4.2–6.1)
HDLC SERPL-MCNC: 46 MG/DL
LDLC SERPL CALC-MCNC: 123 MG/DL
NONHDLC SERPL-MCNC: 151 MG/DL
POTASSIUM SERPL-SCNC: 4.3 MMOL/L (ref 3.4–5.3)
PROT SERPL-MCNC: 6.7 G/DL (ref 6.4–8.3)
SODIUM SERPL-SCNC: 139 MMOL/L (ref 136–145)
TRIGL SERPL-MCNC: 139 MG/DL

## 2023-01-20 PROCEDURE — 80053 COMPREHEN METABOLIC PANEL: CPT | Mod: ORL | Performed by: PHYSICIAN ASSISTANT

## 2023-01-20 PROCEDURE — 80061 LIPID PANEL: CPT | Mod: ORL | Performed by: PHYSICIAN ASSISTANT

## 2023-08-02 ENCOUNTER — HOSPITAL ENCOUNTER (OUTPATIENT)
Dept: ULTRASOUND IMAGING | Facility: HOSPITAL | Age: 58
Discharge: HOME | End: 2023-08-02
Attending: PHYSICIAN ASSISTANT | Admitting: PHYSICIAN ASSISTANT
Payer: MEDICARE

## 2023-08-02 ENCOUNTER — MEDICAL CORRESPONDENCE (OUTPATIENT)
Dept: HEALTH INFORMATION MANAGEMENT | Facility: CLINIC | Age: 58
End: 2023-08-02

## 2023-08-02 DIAGNOSIS — R60.0 EDEMA OF RIGHT LOWER LEG: ICD-10-CM

## 2023-08-02 PROCEDURE — 93971 EXTREMITY STUDY: CPT | Mod: RT

## 2024-03-13 ENCOUNTER — LAB REQUISITION (OUTPATIENT)
Dept: LAB | Facility: CLINIC | Age: 59
End: 2024-03-13
Payer: MEDICARE

## 2024-03-13 DIAGNOSIS — E78.5 HYPERLIPIDEMIA, UNSPECIFIED: ICD-10-CM

## 2024-03-13 DIAGNOSIS — E11.40 TYPE 2 DIABETES MELLITUS WITH DIABETIC NEUROPATHY, UNSPECIFIED (H): ICD-10-CM

## 2024-03-13 PROCEDURE — 82043 UR ALBUMIN QUANTITATIVE: CPT | Mod: ORL | Performed by: PHYSICIAN ASSISTANT

## 2024-03-13 PROCEDURE — 80061 LIPID PANEL: CPT | Mod: ORL | Performed by: PHYSICIAN ASSISTANT

## 2024-03-13 PROCEDURE — 80053 COMPREHEN METABOLIC PANEL: CPT | Mod: ORL | Performed by: PHYSICIAN ASSISTANT

## 2024-03-14 LAB
ALBUMIN SERPL BCG-MCNC: 3.8 G/DL (ref 3.5–5.2)
ALP SERPL-CCNC: 130 U/L (ref 40–150)
ALT SERPL W P-5'-P-CCNC: 18 U/L (ref 0–50)
ANION GAP SERPL CALCULATED.3IONS-SCNC: 12 MMOL/L (ref 7–15)
AST SERPL W P-5'-P-CCNC: 17 U/L (ref 0–45)
BILIRUB SERPL-MCNC: 0.2 MG/DL
BUN SERPL-MCNC: 19.2 MG/DL (ref 6–20)
CALCIUM SERPL-MCNC: 9 MG/DL (ref 8.6–10)
CHLORIDE SERPL-SCNC: 97 MMOL/L (ref 98–107)
CHOLEST SERPL-MCNC: 203 MG/DL
CREAT SERPL-MCNC: 0.6 MG/DL (ref 0.51–0.95)
CREAT UR-MCNC: 27.1 MG/DL
DEPRECATED HCO3 PLAS-SCNC: 26 MMOL/L (ref 22–29)
EGFRCR SERPLBLD CKD-EPI 2021: >90 ML/MIN/1.73M2
FASTING STATUS PATIENT QL REPORTED: ABNORMAL
GLUCOSE SERPL-MCNC: 185 MG/DL (ref 70–99)
HDLC SERPL-MCNC: 39 MG/DL
LDLC SERPL CALC-MCNC: 106 MG/DL
MICROALBUMIN UR-MCNC: 19.9 MG/L
MICROALBUMIN/CREAT UR: 73.43 MG/G CR (ref 0–25)
NONHDLC SERPL-MCNC: 164 MG/DL
POTASSIUM SERPL-SCNC: 4.4 MMOL/L (ref 3.4–5.3)
PROT SERPL-MCNC: 7.2 G/DL (ref 6.4–8.3)
SODIUM SERPL-SCNC: 135 MMOL/L (ref 135–145)
TRIGL SERPL-MCNC: 292 MG/DL

## 2024-08-20 ENCOUNTER — LAB REQUISITION (OUTPATIENT)
Dept: LAB | Facility: CLINIC | Age: 59
End: 2024-08-20
Payer: MEDICARE

## 2024-08-20 DIAGNOSIS — E11.40 TYPE 2 DIABETES MELLITUS WITH DIABETIC NEUROPATHY, UNSPECIFIED (H): ICD-10-CM

## 2024-08-20 DIAGNOSIS — E78.5 HYPERLIPIDEMIA, UNSPECIFIED: ICD-10-CM

## 2024-08-20 PROCEDURE — 80061 LIPID PANEL: CPT | Mod: ORL | Performed by: NURSE PRACTITIONER

## 2024-08-20 PROCEDURE — 80053 COMPREHEN METABOLIC PANEL: CPT | Mod: ORL | Performed by: NURSE PRACTITIONER

## 2024-08-21 LAB
ALBUMIN SERPL BCG-MCNC: 3.9 G/DL (ref 3.5–5.2)
ALP SERPL-CCNC: 117 U/L (ref 40–150)
ALT SERPL W P-5'-P-CCNC: 18 U/L (ref 0–50)
ANION GAP SERPL CALCULATED.3IONS-SCNC: 9 MMOL/L (ref 7–15)
AST SERPL W P-5'-P-CCNC: 16 U/L (ref 0–45)
BILIRUB SERPL-MCNC: 0.2 MG/DL
BUN SERPL-MCNC: 19 MG/DL (ref 6–20)
CALCIUM SERPL-MCNC: 9 MG/DL (ref 8.8–10.4)
CHLORIDE SERPL-SCNC: 100 MMOL/L (ref 98–107)
CHOLEST SERPL-MCNC: 153 MG/DL
CREAT SERPL-MCNC: 0.6 MG/DL (ref 0.51–0.95)
EGFRCR SERPLBLD CKD-EPI 2021: >90 ML/MIN/1.73M2
FASTING STATUS PATIENT QL REPORTED: ABNORMAL
FASTING STATUS PATIENT QL REPORTED: ABNORMAL
GLUCOSE SERPL-MCNC: 138 MG/DL (ref 70–99)
HCO3 SERPL-SCNC: 29 MMOL/L (ref 22–29)
HDLC SERPL-MCNC: 37 MG/DL
LDLC SERPL CALC-MCNC: 80 MG/DL
NONHDLC SERPL-MCNC: 116 MG/DL
POTASSIUM SERPL-SCNC: 4.4 MMOL/L (ref 3.4–5.3)
PROT SERPL-MCNC: 7 G/DL (ref 6.4–8.3)
SODIUM SERPL-SCNC: 138 MMOL/L (ref 135–145)
TRIGL SERPL-MCNC: 180 MG/DL

## 2024-12-02 ENCOUNTER — LAB REQUISITION (OUTPATIENT)
Dept: LAB | Facility: CLINIC | Age: 59
End: 2024-12-02
Payer: MEDICARE

## 2024-12-02 ENCOUNTER — TRANSFERRED RECORDS (OUTPATIENT)
Dept: HEALTH INFORMATION MANAGEMENT | Facility: CLINIC | Age: 59
End: 2024-12-02

## 2024-12-02 DIAGNOSIS — E78.5 HYPERLIPIDEMIA, UNSPECIFIED: ICD-10-CM

## 2024-12-02 DIAGNOSIS — E55.9 VITAMIN D DEFICIENCY, UNSPECIFIED: ICD-10-CM

## 2024-12-02 LAB — HBA1C MFR BLD: 6.6 % (ref 4.2–6.1)

## 2024-12-02 PROCEDURE — 80061 LIPID PANEL: CPT | Mod: ORL | Performed by: NURSE PRACTITIONER

## 2024-12-02 PROCEDURE — 82306 VITAMIN D 25 HYDROXY: CPT | Mod: ORL | Performed by: NURSE PRACTITIONER

## 2024-12-03 LAB
CHOLEST SERPL-MCNC: 159 MG/DL
FASTING STATUS PATIENT QL REPORTED: ABNORMAL
HDLC SERPL-MCNC: 38 MG/DL
LDLC SERPL CALC-MCNC: 78 MG/DL
NONHDLC SERPL-MCNC: 121 MG/DL
TRIGL SERPL-MCNC: 214 MG/DL
VIT D+METAB SERPL-MCNC: 57 NG/ML (ref 20–50)

## 2024-12-10 NOTE — PROGRESS NOTES
Medication Therapy Management (MTM) Encounter    ASSESSMENT:                            Medication Adherence/Access: Obtaining Ozempic through [employer] Patient assistance program     Diabetes  Patient is meeting A1c goal of < 7%.  Patient would benefit from re-enrolling in the [employer] Patient assistance program, she would also like to try using a CGM in the new year.     Hypertension   Stable.      Hyperlipidemia   Stable     Schizophrenia and Insomnia   Managed by psychiatry.       Supplements   Stable.     Neuropathy:   Stable.     PLAN:                            Today we sent in the paperwork for you to re-enroll in the [employer] Patient assistance program for .   We will have you try a Freestyle CGM at our follow up visit for blood sugar monitoring.  Continue taking all other medications as prescribed at this time.      Follow-up: Return in 5 weeks (on 1/15/2025) for Follow up, with me.    SUBJECTIVE/OBJECTIVE:                          Monica Miguel is a 59 year old female seen for an initial visit. She was referred to me from Shiloh Reyes.      Reason for visit: Re-enroll in [employer] PAP.     Allergies/ADRs: Reviewed in chart  Past Medical History: Reviewed in chart  Tobacco: She reports that she has quit smoking. She does not have any smokeless tobacco history on file.  Alcohol: none    Medication Adherence/Access: Occasionally forgets her morning medicines. Using pill planner at home.         Diabetes   Lantus 46 units subcutaneous injection once daily   Ozempic 1 mg subcutaneous weekly injection   Pioglitazone 45 mg tablet by mouth daily   Aspirin 81mg daily    Patient is not experiencing side effects.  Occasional feelings of low blood sugars - happens very infrequently. She is in clinic today to re-enroll in the [employer] Patient assistance program for her Ozempic.     Current diabetes symptoms: none     Blood sugar monitorin time(s) daily; Ranges: (patient reported) Fasting-  mg/dl   Eye exam is up to  date  Foot exam: up to date    Hypertension   Enalapril 2.5 mg tablet by mouth daily   Furosemide 20 mg tablet by mouth twice daily   Patient reports no current medication side effects  Patient does not self-monitor blood pressure.       Hyperlipidemia   Atorvastatin 10 mg daily  Patient reports no significant myalgias or other side effects.  The 10-year ASCVD risk score (Vitor CARVALHO, et al., 2019) is: 6.8%    Values used to calculate the score:      Age: 59 years      Sex: Female      Is Non- : No      Diabetic: Yes      Tobacco smoker: No      Systolic Blood Pressure: 128 mmHg      Is BP treated: Yes      HDL Cholesterol: 38 mg/dL      Total Cholesterol: 159 mg/dL     Mental Health   Schizophrenia and Insomnia   Clozaril 25 mg tablet by mouth at bedtime  Zyprexa 15 mg tablet by mouth at bedtime  Trazodone 100 mg tablet by mouth at bedtime  Effexor  mg capsule by mouth daily   These medications are managed by Arturo Bui. Patient follows with him about every three months. Notes she is unsure if the trazodone is still necessary as she has no trouble sleeping. Patient will be seeing provider in the coming weeks and plans to discuss this dose with him.        Supplements   Vitamin D (Ergocalciferol) 1.25 MG (27767 UT) Capsule, 1 capsule orally once a week (Friday)   Ferrous Sulfate 325 (65 Fe) MG Tablet, 1 tablet orally once daily   No reported issues at this time.        Neuropathy:   Gabapentin 600 mg tablet by mouth three times daily (patient reports taking it twice daily)   Denies issues or side effects.         Today's Vitals: /80   Wt 302 lb (137 kg)   BMI 50.26 kg/m    ----------------      I spent 45 minutes with this patient today. All changes were made via collaborative practice agreement with DMITRIY Kwon CNP. A copy of the visit note was provided to the patient's provider(s).    A summary of these recommendations was sent via clinic portal.    Angelique Jordan  PharmD  Medication Therapy Management Pharmacist          Medication Therapy Recommendations  Type 2 diabetes mellitus without complication, with long-term current use of insulin (H)   1 Current Medication: Semaglutide, 1 MG/DOSE, (OZEMPIC) 4 MG/3ML pen   Current Medication Sig: Inject 1 mg subcutaneously every 7 days.   Rationale: Cannot afford medication product - Cost - Adherence   Recommendation: Referral to Service    Status: Accepted per CPA   Identified Date: 12/11/2024 Completed Date: 12/11/2024

## 2024-12-11 ENCOUNTER — OFFICE VISIT (OUTPATIENT)
Dept: PHARMACY | Facility: PHYSICIAN GROUP | Age: 59
End: 2024-12-11

## 2024-12-11 DIAGNOSIS — I10 BENIGN ESSENTIAL HYPERTENSION: ICD-10-CM

## 2024-12-11 DIAGNOSIS — Z78.9 TAKES DIETARY SUPPLEMENTS: ICD-10-CM

## 2024-12-11 DIAGNOSIS — G62.9 NEUROPATHY: ICD-10-CM

## 2024-12-11 DIAGNOSIS — E11.9 TYPE 2 DIABETES MELLITUS WITHOUT COMPLICATION, WITH LONG-TERM CURRENT USE OF INSULIN (H): Primary | ICD-10-CM

## 2024-12-11 DIAGNOSIS — Z79.4 TYPE 2 DIABETES MELLITUS WITHOUT COMPLICATION, WITH LONG-TERM CURRENT USE OF INSULIN (H): Primary | ICD-10-CM

## 2024-12-11 DIAGNOSIS — F20.9 SCHIZOPHRENIA, UNSPECIFIED TYPE (H): ICD-10-CM

## 2024-12-11 DIAGNOSIS — E78.2 MIXED HYPERLIPIDEMIA: ICD-10-CM

## 2024-12-11 DIAGNOSIS — G47.00 INSOMNIA, UNSPECIFIED TYPE: ICD-10-CM

## 2024-12-12 VITALS — SYSTOLIC BLOOD PRESSURE: 128 MMHG | DIASTOLIC BLOOD PRESSURE: 80 MMHG | BODY MASS INDEX: 50.26 KG/M2 | WEIGHT: 293 LBS

## 2024-12-12 RX ORDER — INSULIN GLARGINE 100 [IU]/ML
46 INJECTION, SOLUTION SUBCUTANEOUS DAILY
COMMUNITY
Start: 2024-12-02

## 2024-12-12 RX ORDER — ATORVASTATIN CALCIUM 10 MG/1
10 TABLET, FILM COATED ORAL DAILY
COMMUNITY
Start: 2024-03-13

## 2024-12-12 RX ORDER — ASPIRIN 81 MG/1
81 TABLET ORAL DAILY
COMMUNITY
Start: 2024-03-13

## 2024-12-12 RX ORDER — ENALAPRIL MALEATE 2.5 MG/1
2.5 TABLET ORAL DAILY
COMMUNITY

## 2024-12-21 ENCOUNTER — HOSPITAL ENCOUNTER (EMERGENCY)
Facility: HOSPITAL | Age: 59
Discharge: STILL ADMITTED OR ADMINISTRATIVE | End: 2024-12-21
Payer: MEDICARE

## 2024-12-21 ENCOUNTER — PREP FOR PROCEDURE (OUTPATIENT)
Dept: SURGERY | Facility: CLINIC | Age: 59
End: 2024-12-21
Payer: MEDICARE

## 2024-12-21 ENCOUNTER — HOSPITAL ENCOUNTER (OUTPATIENT)
Facility: HOSPITAL | Age: 59
End: 2024-12-21
Attending: SURGERY | Admitting: SURGERY
Payer: MEDICARE

## 2024-12-21 ENCOUNTER — HOSPITAL ENCOUNTER (EMERGENCY)
Facility: CLINIC | Age: 59
Discharge: STILL ADMITTED OR ADMINISTRATIVE | End: 2024-12-21
Attending: STUDENT IN AN ORGANIZED HEALTH CARE EDUCATION/TRAINING PROGRAM | Admitting: STUDENT IN AN ORGANIZED HEALTH CARE EDUCATION/TRAINING PROGRAM
Payer: MEDICARE

## 2024-12-21 ENCOUNTER — HOSPITAL ENCOUNTER (OUTPATIENT)
Facility: CLINIC | Age: 59
Setting detail: OBSERVATION
Discharge: HOME | End: 2024-12-23
Attending: STUDENT IN AN ORGANIZED HEALTH CARE EDUCATION/TRAINING PROGRAM | Admitting: STUDENT IN AN ORGANIZED HEALTH CARE EDUCATION/TRAINING PROGRAM
Payer: MEDICARE

## 2024-12-21 DIAGNOSIS — G89.18 ACUTE POST-OPERATIVE PAIN: ICD-10-CM

## 2024-12-21 DIAGNOSIS — K35.30 ACUTE APPENDICITIS WITH LOCALIZED PERITONITIS, WITHOUT PERFORATION, ABSCESS, OR GANGRENE: Primary | ICD-10-CM

## 2024-12-21 DIAGNOSIS — K35.200 ACUTE APPENDICITIS WITH GENERALIZED PERITONITIS WITHOUT GANGRENE, PERFORATION, OR ABSCESS: Primary | ICD-10-CM

## 2024-12-21 RX ORDER — ACETAMINOPHEN 325 MG/1
975 TABLET ORAL ONCE
Status: CANCELLED | OUTPATIENT
Start: 2024-12-21 | End: 2024-12-21

## 2024-12-22 ENCOUNTER — ANESTHESIA EVENT (OUTPATIENT)
Dept: SURGERY | Facility: CLINIC | Age: 59
End: 2024-12-22
Payer: MEDICARE

## 2024-12-22 ENCOUNTER — ANESTHESIA (OUTPATIENT)
Dept: SURGERY | Facility: CLINIC | Age: 59
End: 2024-12-22
Payer: MEDICARE

## 2024-12-22 PROBLEM — K35.80 ACUTE APPENDICITIS: Status: ACTIVE | Noted: 2024-12-22

## 2024-12-22 LAB
BASOPHILS # BLD AUTO: 0 10E3/UL (ref 0–0.2)
BASOPHILS NFR BLD AUTO: 0 %
EOSINOPHIL # BLD AUTO: 0 10E3/UL (ref 0–0.7)
EOSINOPHIL NFR BLD AUTO: 0 %
ERYTHROCYTE [DISTWIDTH] IN BLOOD BY AUTOMATED COUNT: 13.2 % (ref 10–15)
GLUCOSE BLDC GLUCOMTR-MCNC: 111 MG/DL (ref 70–99)
GLUCOSE BLDC GLUCOMTR-MCNC: 115 MG/DL (ref 70–99)
GLUCOSE BLDC GLUCOMTR-MCNC: 116 MG/DL (ref 70–99)
GLUCOSE BLDC GLUCOMTR-MCNC: 140 MG/DL (ref 70–99)
GLUCOSE BLDC GLUCOMTR-MCNC: 144 MG/DL (ref 70–99)
GLUCOSE BLDC GLUCOMTR-MCNC: 151 MG/DL (ref 70–99)
GLUCOSE BLDC GLUCOMTR-MCNC: 188 MG/DL (ref 70–99)
HCT VFR BLD AUTO: 34.6 % (ref 35–47)
HGB BLD-MCNC: 11.5 G/DL (ref 11.7–15.7)
IMM GRANULOCYTES # BLD: 0 10E3/UL
IMM GRANULOCYTES NFR BLD: 0 %
LYMPHOCYTES # BLD AUTO: 2 10E3/UL (ref 0.8–5.3)
LYMPHOCYTES NFR BLD AUTO: 18 %
MCH RBC QN AUTO: 28.5 PG (ref 26.5–33)
MCHC RBC AUTO-ENTMCNC: 33.2 G/DL (ref 31.5–36.5)
MCV RBC AUTO: 86 FL (ref 78–100)
MONOCYTES # BLD AUTO: 0.9 10E3/UL (ref 0–1.3)
MONOCYTES NFR BLD AUTO: 8 %
NEUTROPHILS # BLD AUTO: 8.4 10E3/UL (ref 1.6–8.3)
NEUTROPHILS NFR BLD AUTO: 74 %
NRBC # BLD AUTO: 0 10E3/UL
NRBC BLD AUTO-RTO: 0 /100
PLATELET # BLD AUTO: 276 10E3/UL (ref 150–450)
RBC # BLD AUTO: 4.04 10E6/UL (ref 3.8–5.2)
WBC # BLD AUTO: 11.4 10E3/UL (ref 4–11)

## 2024-12-22 PROCEDURE — 93010 ELECTROCARDIOGRAM REPORT: CPT | Performed by: INTERNAL MEDICINE

## 2024-12-22 PROCEDURE — 250N000012 HC RX MED GY IP 250 OP 636 PS 637: Performed by: PHYSICIAN ASSISTANT

## 2024-12-22 PROCEDURE — 82962 GLUCOSE BLOOD TEST: CPT

## 2024-12-22 PROCEDURE — 250N000025 HC SEVOFLURANE, PER MIN: Performed by: STUDENT IN AN ORGANIZED HEALTH CARE EDUCATION/TRAINING PROGRAM

## 2024-12-22 PROCEDURE — 250N000013 HC RX MED GY IP 250 OP 250 PS 637: Performed by: PHYSICIAN ASSISTANT

## 2024-12-22 PROCEDURE — 99222 1ST HOSP IP/OBS MODERATE 55: CPT | Performed by: PHYSICIAN ASSISTANT

## 2024-12-22 PROCEDURE — 96375 TX/PRO/DX INJ NEW DRUG ADDON: CPT

## 2024-12-22 PROCEDURE — 250N000011 HC RX IP 250 OP 636: Performed by: NURSE ANESTHETIST, CERTIFIED REGISTERED

## 2024-12-22 PROCEDURE — 710N000010 HC RECOVERY PHASE 1, LEVEL 2, PER MIN: Performed by: STUDENT IN AN ORGANIZED HEALTH CARE EDUCATION/TRAINING PROGRAM

## 2024-12-22 PROCEDURE — 85004 AUTOMATED DIFF WBC COUNT: CPT | Performed by: STUDENT IN AN ORGANIZED HEALTH CARE EDUCATION/TRAINING PROGRAM

## 2024-12-22 PROCEDURE — 250N000011 HC RX IP 250 OP 636: Mod: JZ | Performed by: PHYSICIAN ASSISTANT

## 2024-12-22 PROCEDURE — 96374 THER/PROPH/DIAG INJ IV PUSH: CPT

## 2024-12-22 PROCEDURE — 93005 ELECTROCARDIOGRAM TRACING: CPT

## 2024-12-22 PROCEDURE — 44970 LAPAROSCOPY APPENDECTOMY: CPT | Performed by: STUDENT IN AN ORGANIZED HEALTH CARE EDUCATION/TRAINING PROGRAM

## 2024-12-22 PROCEDURE — P9045 ALBUMIN (HUMAN), 5%, 250 ML: HCPCS | Performed by: NURSE ANESTHETIST, CERTIFIED REGISTERED

## 2024-12-22 PROCEDURE — 85048 AUTOMATED LEUKOCYTE COUNT: CPT | Performed by: STUDENT IN AN ORGANIZED HEALTH CARE EDUCATION/TRAINING PROGRAM

## 2024-12-22 PROCEDURE — 36415 COLL VENOUS BLD VENIPUNCTURE: CPT | Performed by: STUDENT IN AN ORGANIZED HEALTH CARE EDUCATION/TRAINING PROGRAM

## 2024-12-22 PROCEDURE — 272N000001 HC OR GENERAL SUPPLY STERILE: Performed by: STUDENT IN AN ORGANIZED HEALTH CARE EDUCATION/TRAINING PROGRAM

## 2024-12-22 PROCEDURE — 258N000003 HC RX IP 258 OP 636: Performed by: PHYSICIAN ASSISTANT

## 2024-12-22 PROCEDURE — 250N000011 HC RX IP 250 OP 636: Performed by: STUDENT IN AN ORGANIZED HEALTH CARE EDUCATION/TRAINING PROGRAM

## 2024-12-22 PROCEDURE — 258N000003 HC RX IP 258 OP 636: Performed by: NURSE ANESTHETIST, CERTIFIED REGISTERED

## 2024-12-22 PROCEDURE — 250N000011 HC RX IP 250 OP 636: Performed by: PHYSICIAN ASSISTANT

## 2024-12-22 PROCEDURE — G0378 HOSPITAL OBSERVATION PER HR: HCPCS

## 2024-12-22 PROCEDURE — 88304 TISSUE EXAM BY PATHOLOGIST: CPT | Mod: TC | Performed by: STUDENT IN AN ORGANIZED HEALTH CARE EDUCATION/TRAINING PROGRAM

## 2024-12-22 PROCEDURE — 96376 TX/PRO/DX INJ SAME DRUG ADON: CPT

## 2024-12-22 PROCEDURE — 250N000009 HC RX 250: Performed by: STUDENT IN AN ORGANIZED HEALTH CARE EDUCATION/TRAINING PROGRAM

## 2024-12-22 PROCEDURE — 360N000076 HC SURGERY LEVEL 3, PER MIN: Performed by: STUDENT IN AN ORGANIZED HEALTH CARE EDUCATION/TRAINING PROGRAM

## 2024-12-22 PROCEDURE — 258N000003 HC RX IP 258 OP 636: Performed by: STUDENT IN AN ORGANIZED HEALTH CARE EDUCATION/TRAINING PROGRAM

## 2024-12-22 PROCEDURE — 250N000013 HC RX MED GY IP 250 OP 250 PS 637: Performed by: STUDENT IN AN ORGANIZED HEALTH CARE EDUCATION/TRAINING PROGRAM

## 2024-12-22 PROCEDURE — 250N000009 HC RX 250: Performed by: NURSE ANESTHETIST, CERTIFIED REGISTERED

## 2024-12-22 PROCEDURE — 250N000013 HC RX MED GY IP 250 OP 250 PS 637: Performed by: SURGERY

## 2024-12-22 PROCEDURE — 370N000017 HC ANESTHESIA TECHNICAL FEE, PER MIN: Performed by: STUDENT IN AN ORGANIZED HEALTH CARE EDUCATION/TRAINING PROGRAM

## 2024-12-22 PROCEDURE — 999N000141 HC STATISTIC PRE-PROCEDURE NURSING ASSESSMENT: Performed by: STUDENT IN AN ORGANIZED HEALTH CARE EDUCATION/TRAINING PROGRAM

## 2024-12-22 RX ORDER — CIPROFLOXACIN 2 MG/ML
400 INJECTION, SOLUTION INTRAVENOUS EVERY 12 HOURS
Status: DISCONTINUED | OUTPATIENT
Start: 2024-12-22 | End: 2024-12-23

## 2024-12-22 RX ORDER — SODIUM CHLORIDE, SODIUM LACTATE, POTASSIUM CHLORIDE, CALCIUM CHLORIDE 600; 310; 30; 20 MG/100ML; MG/100ML; MG/100ML; MG/100ML
INJECTION, SOLUTION INTRAVENOUS CONTINUOUS PRN
Status: DISCONTINUED | OUTPATIENT
Start: 2024-12-22 | End: 2024-12-22

## 2024-12-22 RX ORDER — ONDANSETRON 4 MG/1
4 TABLET, ORALLY DISINTEGRATING ORAL EVERY 30 MIN PRN
Status: DISCONTINUED | OUTPATIENT
Start: 2024-12-22 | End: 2024-12-22 | Stop reason: HOSPADM

## 2024-12-22 RX ORDER — VENLAFAXINE HYDROCHLORIDE 150 MG/1
150 CAPSULE, EXTENDED RELEASE ORAL AT BEDTIME
Status: DISCONTINUED | OUTPATIENT
Start: 2024-12-22 | End: 2024-12-23 | Stop reason: HOSPADM

## 2024-12-22 RX ORDER — NALOXONE HYDROCHLORIDE 0.4 MG/ML
0.4 INJECTION, SOLUTION INTRAMUSCULAR; INTRAVENOUS; SUBCUTANEOUS
Status: DISCONTINUED | OUTPATIENT
Start: 2024-12-22 | End: 2024-12-23 | Stop reason: HOSPADM

## 2024-12-22 RX ORDER — KETOROLAC TROMETHAMINE 30 MG/ML
30 INJECTION, SOLUTION INTRAMUSCULAR; INTRAVENOUS EVERY 6 HOURS PRN
Status: DISCONTINUED | OUTPATIENT
Start: 2024-12-22 | End: 2024-12-22

## 2024-12-22 RX ORDER — OXYCODONE HYDROCHLORIDE 5 MG/1
5 TABLET ORAL EVERY 4 HOURS PRN
Status: DISCONTINUED | OUTPATIENT
Start: 2024-12-22 | End: 2024-12-23 | Stop reason: HOSPADM

## 2024-12-22 RX ORDER — CLINDAMYCIN PHOSPHATE 900 MG/50ML
900 INJECTION, SOLUTION INTRAVENOUS
Status: COMPLETED | OUTPATIENT
Start: 2024-12-22 | End: 2024-12-22

## 2024-12-22 RX ORDER — PROCHLORPERAZINE MALEATE 10 MG
10 TABLET ORAL EVERY 6 HOURS PRN
Status: DISCONTINUED | OUTPATIENT
Start: 2024-12-22 | End: 2024-12-23 | Stop reason: HOSPADM

## 2024-12-22 RX ORDER — SIMETHICONE 80 MG
80 TABLET,CHEWABLE ORAL 4 TIMES DAILY
Status: DISCONTINUED | OUTPATIENT
Start: 2024-12-22 | End: 2024-12-23 | Stop reason: HOSPADM

## 2024-12-22 RX ORDER — MAGNESIUM HYDROXIDE 1200 MG/15ML
LIQUID ORAL PRN
Status: DISCONTINUED | OUTPATIENT
Start: 2024-12-22 | End: 2024-12-22 | Stop reason: HOSPADM

## 2024-12-22 RX ORDER — FUROSEMIDE 20 MG/1
20 TABLET ORAL
Status: DISCONTINUED | OUTPATIENT
Start: 2024-12-23 | End: 2024-12-23 | Stop reason: HOSPADM

## 2024-12-22 RX ORDER — SODIUM CHLORIDE, SODIUM LACTATE, POTASSIUM CHLORIDE, CALCIUM CHLORIDE 600; 310; 30; 20 MG/100ML; MG/100ML; MG/100ML; MG/100ML
INJECTION, SOLUTION INTRAVENOUS CONTINUOUS
Status: DISCONTINUED | OUTPATIENT
Start: 2024-12-22 | End: 2024-12-22 | Stop reason: HOSPADM

## 2024-12-22 RX ORDER — PROPOFOL 10 MG/ML
INJECTION, EMULSION INTRAVENOUS PRN
Status: DISCONTINUED | OUTPATIENT
Start: 2024-12-22 | End: 2024-12-22

## 2024-12-22 RX ORDER — ONDANSETRON 2 MG/ML
INJECTION INTRAMUSCULAR; INTRAVENOUS PRN
Status: DISCONTINUED | OUTPATIENT
Start: 2024-12-22 | End: 2024-12-22

## 2024-12-22 RX ORDER — ONDANSETRON 2 MG/ML
4 INJECTION INTRAMUSCULAR; INTRAVENOUS EVERY 30 MIN PRN
Status: DISCONTINUED | OUTPATIENT
Start: 2024-12-22 | End: 2024-12-22 | Stop reason: HOSPADM

## 2024-12-22 RX ORDER — FENTANYL CITRATE 50 UG/ML
INJECTION, SOLUTION INTRAMUSCULAR; INTRAVENOUS PRN
Status: DISCONTINUED | OUTPATIENT
Start: 2024-12-22 | End: 2024-12-22

## 2024-12-22 RX ORDER — FENTANYL CITRATE 0.05 MG/ML
50 INJECTION, SOLUTION INTRAMUSCULAR; INTRAVENOUS EVERY 5 MIN PRN
Status: DISCONTINUED | OUTPATIENT
Start: 2024-12-22 | End: 2024-12-22 | Stop reason: HOSPADM

## 2024-12-22 RX ORDER — CLINDAMYCIN PHOSPHATE 900 MG/50ML
900 INJECTION, SOLUTION INTRAVENOUS SEE ADMIN INSTRUCTIONS
Status: DISCONTINUED | OUTPATIENT
Start: 2024-12-22 | End: 2024-12-22 | Stop reason: HOSPADM

## 2024-12-22 RX ORDER — AMOXICILLIN 250 MG
1 CAPSULE ORAL 2 TIMES DAILY
Qty: 15 TABLET | Refills: 0 | Status: SHIPPED | OUTPATIENT
Start: 2024-12-22

## 2024-12-22 RX ORDER — LIDOCAINE HYDROCHLORIDE 20 MG/ML
INJECTION, SOLUTION INFILTRATION; PERINEURAL PRN
Status: DISCONTINUED | OUTPATIENT
Start: 2024-12-22 | End: 2024-12-22

## 2024-12-22 RX ORDER — GABAPENTIN 600 MG/1
600 TABLET ORAL 3 TIMES DAILY
Status: DISCONTINUED | OUTPATIENT
Start: 2024-12-22 | End: 2024-12-23 | Stop reason: HOSPADM

## 2024-12-22 RX ORDER — HYDROMORPHONE HCL IN WATER/PF 6 MG/30 ML
0.2 PATIENT CONTROLLED ANALGESIA SYRINGE INTRAVENOUS EVERY 5 MIN PRN
Status: DISCONTINUED | OUTPATIENT
Start: 2024-12-22 | End: 2024-12-22 | Stop reason: HOSPADM

## 2024-12-22 RX ORDER — NICOTINE POLACRILEX 4 MG
15-30 LOZENGE BUCCAL
Status: DISCONTINUED | OUTPATIENT
Start: 2024-12-22 | End: 2024-12-23 | Stop reason: HOSPADM

## 2024-12-22 RX ORDER — NALOXONE HYDROCHLORIDE 0.4 MG/ML
0.2 INJECTION, SOLUTION INTRAMUSCULAR; INTRAVENOUS; SUBCUTANEOUS
Status: DISCONTINUED | OUTPATIENT
Start: 2024-12-22 | End: 2024-12-23 | Stop reason: HOSPADM

## 2024-12-22 RX ORDER — HYDROMORPHONE HCL IN WATER/PF 6 MG/30 ML
0.4 PATIENT CONTROLLED ANALGESIA SYRINGE INTRAVENOUS EVERY 5 MIN PRN
Status: DISCONTINUED | OUTPATIENT
Start: 2024-12-22 | End: 2024-12-22 | Stop reason: HOSPADM

## 2024-12-22 RX ORDER — AMOXICILLIN 250 MG
1 CAPSULE ORAL 2 TIMES DAILY
Status: DISCONTINUED | OUTPATIENT
Start: 2024-12-22 | End: 2024-12-23 | Stop reason: HOSPADM

## 2024-12-22 RX ORDER — ENALAPRIL MALEATE 2.5 MG/1
2.5 TABLET ORAL DAILY
Status: DISCONTINUED | OUTPATIENT
Start: 2024-12-22 | End: 2024-12-23 | Stop reason: HOSPADM

## 2024-12-22 RX ORDER — INSULIN GLARGINE 100 [IU]/ML
46 INJECTION, SOLUTION SUBCUTANEOUS AT BEDTIME
COMMUNITY

## 2024-12-22 RX ORDER — FENTANYL CITRATE 0.05 MG/ML
25 INJECTION, SOLUTION INTRAMUSCULAR; INTRAVENOUS EVERY 5 MIN PRN
Status: DISCONTINUED | OUTPATIENT
Start: 2024-12-22 | End: 2024-12-22 | Stop reason: HOSPADM

## 2024-12-22 RX ORDER — METRONIDAZOLE 500 MG/100ML
500 INJECTION, SOLUTION INTRAVENOUS EVERY 8 HOURS
Status: DISCONTINUED | OUTPATIENT
Start: 2024-12-22 | End: 2024-12-22

## 2024-12-22 RX ORDER — HYDROMORPHONE HCL IN WATER/PF 6 MG/30 ML
0.2 PATIENT CONTROLLED ANALGESIA SYRINGE INTRAVENOUS
Status: DISCONTINUED | OUTPATIENT
Start: 2024-12-22 | End: 2024-12-23 | Stop reason: HOSPADM

## 2024-12-22 RX ORDER — ONDANSETRON 4 MG/1
4 TABLET, ORALLY DISINTEGRATING ORAL EVERY 6 HOURS PRN
Status: DISCONTINUED | OUTPATIENT
Start: 2024-12-22 | End: 2024-12-23 | Stop reason: HOSPADM

## 2024-12-22 RX ORDER — GLYCOPYRROLATE 0.2 MG/ML
INJECTION, SOLUTION INTRAMUSCULAR; INTRAVENOUS PRN
Status: DISCONTINUED | OUTPATIENT
Start: 2024-12-22 | End: 2024-12-22

## 2024-12-22 RX ORDER — SODIUM CHLORIDE 9 MG/ML
INJECTION, SOLUTION INTRAVENOUS CONTINUOUS
Status: DISCONTINUED | OUTPATIENT
Start: 2024-12-22 | End: 2024-12-23 | Stop reason: HOSPADM

## 2024-12-22 RX ORDER — ONDANSETRON 2 MG/ML
4 INJECTION INTRAMUSCULAR; INTRAVENOUS EVERY 6 HOURS PRN
Status: DISCONTINUED | OUTPATIENT
Start: 2024-12-22 | End: 2024-12-23 | Stop reason: HOSPADM

## 2024-12-22 RX ORDER — DEXTROSE MONOHYDRATE 25 G/50ML
25-50 INJECTION, SOLUTION INTRAVENOUS
Status: DISCONTINUED | OUTPATIENT
Start: 2024-12-22 | End: 2024-12-23 | Stop reason: HOSPADM

## 2024-12-22 RX ORDER — METRONIDAZOLE 500 MG/100ML
500 INJECTION, SOLUTION INTRAVENOUS EVERY 12 HOURS
Status: DISCONTINUED | OUTPATIENT
Start: 2024-12-22 | End: 2024-12-23

## 2024-12-22 RX ORDER — LIDOCAINE 40 MG/G
CREAM TOPICAL
Status: DISCONTINUED | OUTPATIENT
Start: 2024-12-22 | End: 2024-12-23 | Stop reason: HOSPADM

## 2024-12-22 RX ORDER — OLANZAPINE 15 MG/1
15 TABLET ORAL AT BEDTIME
Status: DISCONTINUED | OUTPATIENT
Start: 2024-12-22 | End: 2024-12-23 | Stop reason: HOSPADM

## 2024-12-22 RX ORDER — ACETAMINOPHEN 325 MG/1
975 TABLET ORAL ONCE
Status: COMPLETED | OUTPATIENT
Start: 2024-12-22 | End: 2024-12-22

## 2024-12-22 RX ORDER — DEXAMETHASONE SODIUM PHOSPHATE 4 MG/ML
4 INJECTION, SOLUTION INTRA-ARTICULAR; INTRALESIONAL; INTRAMUSCULAR; INTRAVENOUS; SOFT TISSUE
Status: DISCONTINUED | OUTPATIENT
Start: 2024-12-22 | End: 2024-12-22 | Stop reason: HOSPADM

## 2024-12-22 RX ORDER — ACETAMINOPHEN 325 MG/1
975 TABLET ORAL EVERY 6 HOURS
Status: CANCELLED | OUTPATIENT
Start: 2024-12-22

## 2024-12-22 RX ORDER — TRAZODONE HYDROCHLORIDE 100 MG/1
100 TABLET ORAL AT BEDTIME
Status: DISCONTINUED | OUTPATIENT
Start: 2024-12-22 | End: 2024-12-23 | Stop reason: HOSPADM

## 2024-12-22 RX ORDER — ACETAMINOPHEN 325 MG/1
975 TABLET ORAL 3 TIMES DAILY
Status: DISCONTINUED | OUTPATIENT
Start: 2024-12-22 | End: 2024-12-23 | Stop reason: HOSPADM

## 2024-12-22 RX ORDER — CLOZAPINE 25 MG/1
25 TABLET ORAL AT BEDTIME
Status: DISCONTINUED | OUTPATIENT
Start: 2024-12-22 | End: 2024-12-23 | Stop reason: HOSPADM

## 2024-12-22 RX ORDER — BUPIVACAINE HYDROCHLORIDE AND EPINEPHRINE 5; 5 MG/ML; UG/ML
INJECTION, SOLUTION PERINEURAL PRN
Status: DISCONTINUED | OUTPATIENT
Start: 2024-12-22 | End: 2024-12-22 | Stop reason: HOSPADM

## 2024-12-22 RX ORDER — VASOPRESSIN IN 0.9 % NACL 2 UNIT/2ML
SYRINGE (ML) INTRAVENOUS PRN
Status: DISCONTINUED | OUTPATIENT
Start: 2024-12-22 | End: 2024-12-22

## 2024-12-22 RX ORDER — HYDROMORPHONE HCL IN WATER/PF 6 MG/30 ML
0.4 PATIENT CONTROLLED ANALGESIA SYRINGE INTRAVENOUS
Status: DISCONTINUED | OUTPATIENT
Start: 2024-12-22 | End: 2024-12-23 | Stop reason: HOSPADM

## 2024-12-22 RX ORDER — OXYCODONE HYDROCHLORIDE 5 MG/1
5 TABLET ORAL EVERY 4 HOURS PRN
Qty: 15 TABLET | Refills: 0 | Status: SHIPPED | OUTPATIENT
Start: 2024-12-22 | End: 2024-12-25

## 2024-12-22 RX ORDER — NALOXONE HYDROCHLORIDE 0.4 MG/ML
0.1 INJECTION, SOLUTION INTRAMUSCULAR; INTRAVENOUS; SUBCUTANEOUS
Status: DISCONTINUED | OUTPATIENT
Start: 2024-12-22 | End: 2024-12-22 | Stop reason: HOSPADM

## 2024-12-22 RX ADMIN — PHENYLEPHRINE HYDROCHLORIDE 100 MCG: 10 INJECTION INTRAVENOUS at 09:28

## 2024-12-22 RX ADMIN — TRAZODONE HYDROCHLORIDE 100 MG: 100 TABLET ORAL at 21:12

## 2024-12-22 RX ADMIN — ACETAMINOPHEN 975 MG: 325 TABLET, FILM COATED ORAL at 21:11

## 2024-12-22 RX ADMIN — MIDAZOLAM 1 MG: 1 INJECTION INTRAMUSCULAR; INTRAVENOUS at 08:59

## 2024-12-22 RX ADMIN — Medication 0.25 UNITS: at 09:36

## 2024-12-22 RX ADMIN — SENNOSIDES AND DOCUSATE SODIUM 1 TABLET: 50; 8.6 TABLET ORAL at 21:12

## 2024-12-22 RX ADMIN — ALBUMIN (HUMAN): 12.5 SOLUTION INTRAVENOUS at 09:28

## 2024-12-22 RX ADMIN — Medication 0.25 UNITS: at 09:25

## 2024-12-22 RX ADMIN — ONDANSETRON 4 MG: 2 INJECTION INTRAMUSCULAR; INTRAVENOUS at 09:51

## 2024-12-22 RX ADMIN — ROCURONIUM BROMIDE 40 MG: 50 INJECTION, SOLUTION INTRAVENOUS at 09:18

## 2024-12-22 RX ADMIN — PHENYLEPHRINE HYDROCHLORIDE 100 MCG: 10 INJECTION INTRAVENOUS at 09:19

## 2024-12-22 RX ADMIN — CLINDAMYCIN PHOSPHATE 900 MG: 900 INJECTION, SOLUTION INTRAVENOUS at 08:53

## 2024-12-22 RX ADMIN — INSULIN ASPART 1 UNITS: 100 INJECTION, SOLUTION INTRAVENOUS; SUBCUTANEOUS at 16:34

## 2024-12-22 RX ADMIN — LIDOCAINE HYDROCHLORIDE 100 MG: 20 INJECTION, SOLUTION INFILTRATION; PERINEURAL at 09:12

## 2024-12-22 RX ADMIN — SODIUM CHLORIDE, POTASSIUM CHLORIDE, SODIUM LACTATE AND CALCIUM CHLORIDE: 600; 310; 30; 20 INJECTION, SOLUTION INTRAVENOUS at 09:48

## 2024-12-22 RX ADMIN — SODIUM CHLORIDE: 9 INJECTION, SOLUTION INTRAVENOUS at 02:15

## 2024-12-22 RX ADMIN — INSULIN GLARGINE 30 UNITS: 100 INJECTION, SOLUTION SUBCUTANEOUS at 22:35

## 2024-12-22 RX ADMIN — SIMETHICONE 80 MG: 80 TABLET, CHEWABLE ORAL at 13:03

## 2024-12-22 RX ADMIN — METRONIDAZOLE 500 MG: 500 INJECTION, SOLUTION INTRAVENOUS at 19:10

## 2024-12-22 RX ADMIN — SIMETHICONE 80 MG: 80 TABLET, CHEWABLE ORAL at 21:12

## 2024-12-22 RX ADMIN — SENNOSIDES AND DOCUSATE SODIUM 1 TABLET: 50; 8.6 TABLET ORAL at 13:03

## 2024-12-22 RX ADMIN — VENLAFAXINE HYDROCHLORIDE 150 MG: 150 CAPSULE, EXTENDED RELEASE ORAL at 21:12

## 2024-12-22 RX ADMIN — PHENYLEPHRINE HYDROCHLORIDE 100 MCG: 10 INJECTION INTRAVENOUS at 10:05

## 2024-12-22 RX ADMIN — GABAPENTIN 600 MG: 600 TABLET, FILM COATED ORAL at 21:12

## 2024-12-22 RX ADMIN — KETOROLAC TROMETHAMINE 30 MG: 30 INJECTION, SOLUTION INTRAMUSCULAR at 02:29

## 2024-12-22 RX ADMIN — GLYCOPYRROLATE 0.1 MG: 0.2 INJECTION, SOLUTION INTRAMUSCULAR; INTRAVENOUS at 09:38

## 2024-12-22 RX ADMIN — PHENYLEPHRINE HYDROCHLORIDE 0.5 MCG/KG/MIN: 10 INJECTION INTRAVENOUS at 09:28

## 2024-12-22 RX ADMIN — FENTANYL CITRATE 50 MCG: 50 INJECTION INTRAMUSCULAR; INTRAVENOUS at 09:12

## 2024-12-22 RX ADMIN — PHENYLEPHRINE HYDROCHLORIDE 100 MCG: 10 INJECTION INTRAVENOUS at 08:16

## 2024-12-22 RX ADMIN — SODIUM CHLORIDE: 9 INJECTION, SOLUTION INTRAVENOUS at 13:03

## 2024-12-22 RX ADMIN — GLYCOPYRROLATE 0.1 MG: 0.2 INJECTION, SOLUTION INTRAMUSCULAR; INTRAVENOUS at 09:36

## 2024-12-22 RX ADMIN — OLANZAPINE 15 MG: 15 TABLET, FILM COATED ORAL at 21:12

## 2024-12-22 RX ADMIN — OXYCODONE HYDROCHLORIDE 5 MG: 5 TABLET ORAL at 16:34

## 2024-12-22 RX ADMIN — Medication 400 MG: at 10:17

## 2024-12-22 RX ADMIN — TRAZODONE HYDROCHLORIDE 100 MG: 100 TABLET ORAL at 03:16

## 2024-12-22 RX ADMIN — CIPROFLOXACIN 400 MG: 2 INJECTION, SOLUTION INTRAVENOUS at 14:27

## 2024-12-22 RX ADMIN — ACETAMINOPHEN 975 MG: 325 TABLET, FILM COATED ORAL at 06:28

## 2024-12-22 RX ADMIN — SIMETHICONE 80 MG: 80 TABLET, CHEWABLE ORAL at 16:34

## 2024-12-22 RX ADMIN — INSULIN ASPART 1 UNITS: 100 INJECTION, SOLUTION INTRAVENOUS; SUBCUTANEOUS at 13:03

## 2024-12-22 RX ADMIN — Medication 0.25 UNITS: at 09:33

## 2024-12-22 RX ADMIN — VENLAFAXINE HYDROCHLORIDE 150 MG: 150 CAPSULE, EXTENDED RELEASE ORAL at 03:16

## 2024-12-22 RX ADMIN — SUCCINYLCHOLINE CHLORIDE 100 MG: 20 INJECTION, SOLUTION INTRAMUSCULAR; INTRAVENOUS; PARENTERAL at 09:12

## 2024-12-22 RX ADMIN — CIPROFLOXACIN 400 MG: 2 INJECTION, SOLUTION INTRAVENOUS at 02:16

## 2024-12-22 RX ADMIN — CLOZAPINE 25 MG: 25 TABLET ORAL at 21:12

## 2024-12-22 RX ADMIN — ACETAMINOPHEN 975 MG: 325 TABLET, FILM COATED ORAL at 13:03

## 2024-12-22 RX ADMIN — Medication 0.25 UNITS: at 10:16

## 2024-12-22 RX ADMIN — SODIUM CHLORIDE, POTASSIUM CHLORIDE, SODIUM LACTATE AND CALCIUM CHLORIDE: 600; 310; 30; 20 INJECTION, SOLUTION INTRAVENOUS at 08:59

## 2024-12-22 RX ADMIN — METRONIDAZOLE 500 MG: 500 INJECTION, SOLUTION INTRAVENOUS at 06:27

## 2024-12-22 RX ADMIN — PROPOFOL 200 MG: 10 INJECTION, EMULSION INTRAVENOUS at 09:12

## 2024-12-22 RX ADMIN — ENALAPRIL MALEATE 2.5 MG: 2.5 TABLET ORAL at 14:23

## 2024-12-22 RX ADMIN — GABAPENTIN 600 MG: 600 TABLET, FILM COATED ORAL at 14:23

## 2024-12-22 RX ADMIN — OLANZAPINE 15 MG: 15 TABLET, FILM COATED ORAL at 03:16

## 2024-12-22 ASSESSMENT — ACTIVITIES OF DAILY LIVING (ADL)
ADLS_ACUITY_SCORE: 44
ADLS_ACUITY_SCORE: 43
ADLS_ACUITY_SCORE: 41
ADLS_ACUITY_SCORE: 43
ADLS_ACUITY_SCORE: 43
ADLS_ACUITY_SCORE: 42
ADLS_ACUITY_SCORE: 42
ADLS_ACUITY_SCORE: 43
ADLS_ACUITY_SCORE: 42
ADLS_ACUITY_SCORE: 42
ADLS_ACUITY_SCORE: 40
ADLS_ACUITY_SCORE: 43
ADLS_ACUITY_SCORE: 44
ADLS_ACUITY_SCORE: 46
ADLS_ACUITY_SCORE: 42
ADLS_ACUITY_SCORE: 43
ADLS_ACUITY_SCORE: 42
ADLS_ACUITY_SCORE: 43
ADLS_ACUITY_SCORE: 42
ADLS_ACUITY_SCORE: 44
ADLS_ACUITY_SCORE: 42
ADLS_ACUITY_SCORE: 43
ADLS_ACUITY_SCORE: 46

## 2024-12-22 ASSESSMENT — ENCOUNTER SYMPTOMS: SEIZURES: 0

## 2024-12-22 ASSESSMENT — LIFESTYLE VARIABLES: TOBACCO_USE: 1

## 2024-12-22 NOTE — PHARMACY-ADMISSION MEDICATION HISTORY
Pharmacist Admission Medication History    Admission medication history is complete. The information provided in this note is only as accurate as the sources available at the time of the update.    Information Source(s): Patient and CareEverywhere/SureScripts via in-person    Pertinent Information: none    Changes made to PTA medication list:  Added: none  Deleted: None  Changed:   Aspirin 81mg daily --> at bedtime  Lipitor 10mg daily --> at bedtime  Iron 325mg daily --> at bedtime  Lasix 20mg daily --> BID  Lantus 46 units daily --> Basaglar Kwikpen 46 units at bedtime  Effexor-ER 150mg daily --> QHS    Allergies reviewed with patient and updates made in EHR: yes    Medication History Completed By: Mayte Benitez [facility] 12/22/2024 12:47 PM    PTA Med List   Medication Sig Last Dose/Taking    acetaminophen (TYLENOL) 500 MG tablet Take 500-1,000 mg by mouth every 6 hours as needed for mild pain Taking As Needed    aspirin 81 MG EC tablet Take 81 mg by mouth at bedtime. 12/20/2024 Bedtime    atorvastatin (LIPITOR) 10 MG tablet Take 10 mg by mouth at bedtime. 12/20/2024 Bedtime    cholecalciferol (VITAMIN D3) 1250 mcg (01015 units) capsule Take 50,000 Units by mouth once a week. On Fridays 12/20/2024    cloZAPine (CLOZARIL) 25 MG tablet Take 25 mg by mouth At Bedtime 12/20/2024 at  9:00 PM    enalapril (VASOTEC) 2.5 MG tablet Take 2.5 mg by mouth daily. 12/20/2024 Morning    ferrous sulfate (FEROSUL) 325 (65 Fe) MG tablet Take 1 tablet by mouth at bedtime. 12/20/2024 Bedtime    furosemide (LASIX) 20 MG tablet Take 20 mg by mouth 2 times daily. 12/20/2024 Bedtime    gabapentin (NEURONTIN) 600 MG tablet Take 600 mg by mouth 3 times daily  12/20/2024 Bedtime    insulin glargine 100 UNIT/ML pen Inject 46 Units subcutaneously at bedtime. 12/20/2024 Bedtime    OLANZapine (ZYPREXA) 15 MG tablet Take 15 mg by mouth At Bedtime 12/21/2024 Bedtime    oxyCODONE (ROXICODONE) 5 MG tablet Take 1 tablet (5 mg) by mouth every 4 hours as  needed. Taking As Needed    pioglitazone (ACTOS) 45 MG tablet Take 45 mg by mouth daily 12/20/2024 Morning    Semaglutide, 1 MG/DOSE, (OZEMPIC) 4 MG/3ML pen Inject 1 mg subcutaneously every 7 days. On Fridays 12/20/2024 Morning    senna-docusate (SENOKOT-S/PERICOLACE) 8.6-50 MG tablet Take 1 tablet by mouth 2 times daily. Taking    traZODone (DESYREL) 100 MG tablet Take 100 mg by mouth at bedtime. 12/21/2024 Bedtime    venlafaxine (EFFEXOR-XR) 150 MG 24 hr capsule Take 150 mg by mouth at bedtime. 12/21/2024 Bedtime

## 2024-12-22 NOTE — ANESTHESIA POSTPROCEDURE EVALUATION
Patient: Monica Miguel    Procedure: Procedure(s):  APPENDECTOMY, LAPAROSCOPIC       Anesthesia Type:  General    Note:     Postop Pain Control: Uneventful            Sign Out: Well controlled pain   PONV: No   Neuro/Psych: Uneventful            Sign Out: Acceptable/Baseline neuro status   Airway/Respiratory: Uneventful            Sign Out: Acceptable/Baseline resp. status   CV/Hemodynamics: Uneventful            Sign Out: Acceptable CV status; No obvious hypovolemia; No obvious fluid overload   Other NRE: NONE   DID A NON-ROUTINE EVENT OCCUR? No           Last vitals:  Vitals Value Taken Time   /48 12/22/24 1130   Temp 36.1  C (97  F) 12/22/24 1115   Pulse 73 12/22/24 1143   Resp 20 12/22/24 1143   SpO2 93 % 12/22/24 1143   Vitals shown include unfiled device data.    Electronically Signed By: Jc Nogueira MD  December 22, 2024  1:57 PM

## 2024-12-22 NOTE — PROGRESS NOTES
Observation goals  PRIOR TO DISCHARGE        Comments: List all goals to be met before discharge home  - Nausea/vomiting (diarrhea if present) improved:N/A  - Tolerating oral intake to maintain hydration:N/A NPO  - Vital signs normal or at patient baseline and orthostatic vitals are normal and patient not lightheaded with standing:Met  - Diagnostic tests and consults completed and resulted if ordered:Met  - Adequate pain control on oral analgesia:met  - Tolerating oral antibiotics if ordered:N/A only IV abx ordered  - Safe disposition plan has been identified:Not met  - Nurse to notify provider when observation goals have been met and patient is ready for discharge.

## 2024-12-22 NOTE — ANESTHESIA PROCEDURE NOTES
Airway       Patient location during procedure: OR       Procedure Start/Stop Times: 12/22/2024 9:15 AM  Staff -        Anesthesiologist:  Jc Nogueira MD       CRNA: Alicia Salmeron APRN CRNA       Performed By: CRNAIndications and Patient Condition       Indications for airway management: yenni-procedural       Induction type:RSI       Mask difficulty assessment: 0 - not attempted    Final Airway Details       Final airway type: endotracheal airway       Successful airway: ETT - single  Endotracheal Airway Details        ETT size (mm): 7.0       Successful intubation technique: video laryngoscopy       VL Blade Size: Maradiaga 3       Grade View of Cords: 1       Adjucts: stylet       Position: Right       Measured from: lips       Secured at (cm): 21       Bite block used: None    Post intubation assessment        Placement verified by: capnometry, equal breath sounds and chest rise        Number of attempts at approach: 1       Number of other approaches attempted: 0       Secured with: tape       Ease of procedure: easy       Dentition: Intact and Unchanged    Medication(s) Administered   Medication Administration Time: 12/22/2024 9:15 AM

## 2024-12-22 NOTE — DISCHARGE INSTRUCTIONS
[facility] - [facility] CONSULT[facility]  Discharge Instructions: Post-Operative Appendectomy    ACTIVITY  Expect to feel tired after your surgery.  This will gradually resolve.    Take frequent, short walks and increase your activity gradually.    Avoid strenuous physical activity or heavy lifting greater than 15-20 lbs. for 2-3 weeks.  You may climb stairs.  You may drive without restrictions when you are not using any prescription pain medication and feel comfortable in a car.  You may return to work/school when you are comfortable without any prescription pain medication.    WOUND CARE  You may remove your outer dressing or Band-Aids and shower 48 hours after the surgery.  Pat your incisions dry and leave them open to air.  Re-apply dressing (Band-Aids or gauze/tape) as needed for comfort or drainage.  You may have steri-strips (looks like white tape) on your incision.  You may peel off the steri-strips 2 weeks after your surgery if they have not peeled off on their own.  If you have skin glue, it will peel up and fall off on its own.  Do not soak your incisions in a tub or pool for 2 weeks.   Do not apply any lotions, creams, or ointments to your incisions.  A ridge under your incisions is normal and will gradually resolve.    DIET  Start with liquids, then gradually resume your regular diet as tolerated.  Avoid heavy, spicy, and greasy meals for 2-3 days.  Drink plenty of fluids to stay hydrated.    PAIN  Expect some tenderness and discomfort at the incision sites.  Use the prescribed pain medication at your discretion.  Expect gradual resolution of your pain over several days.  You may take ibuprofen with food (unless you have been told not to) or acetaminophen/Tylenol instead of or in addition to your prescribed pain medication.  However, if you are taking Norco or Percocet, do not take any additional acetaminophen/Tylenol.  Do not drink alcohol or drive while you are taking pain medications.  You may apply  ice to your incisions in 20 minute intervals as needed for the next 48 hours.  After that time, consider switching to heat if you prefer.    EXPECTATIONS  Pain medications can cause constipation.  Limit use when possible.  Take an over the counter or prescribed stool softener/stimulant, such as Colace or Senna, 1-2 times a day with plenty of water.  You may take a mild over the counter laxative, such as Miralax or a suppository, as needed.  You make discontinue these medications once you are having regular bowel movements and/or are no longer taking your narcotic pain medication.   You may have shoulder or upper back discomfort due to the gas used in surgery.  This is temporary and should resolve in 48-72 hours.  Short, frequent walks may help with this.  If you are unable to urinate for 8 hours or feel as though you are not emptying your bladder adequately, we recommend you seek care at an ER or Urgent Care facility for possible catheter placement.     FOLLOW UP  Our office will contact you in approximately 2-3 weeks to check on your progress and answer any questions you may have.  If you are doing well, you will not need to return for a follow up appointment.  If any concerns are identified over the phone, we will help you make an appointment to see a provider.   If you have not received a phone call, have any questions or concerns, or would like to be seen, please call us at [phone] and ask to speak with our nurse.  We are located at [address] [place] [address].    CALL OUR OFFICE -[phone] IF YOU HAVE:   Chills or fever above 101 F.  Increased redness, warmth, or drainage at your incisions.  Significant bleeding.  Pain not relieved by your pain medication or rest.  Increasing pain after the first 48 hours.  Any other concerns or questions.             Revised October 2022

## 2024-12-22 NOTE — CONSULTS
[facility]  Consult Note - Hospitalist Service     Date of Admission:  12/21/2024  Consult Requested by: general surgery  Reason for Consult: medical management of comorbidities  PRIMARY CARE PROVIDER:    Shiloh Reyes    Assessment & Plan   Monica Miguel is a 59 year old female with PMH of DM II, HTN, HLD, insomnia, schizophrenia, neuropathy admitted to general surgery service on 12/21/2024 for acute appendicitis, s/p lap appendectomy 12/22. Hospitalist consult requested for medical management of comorbidities.    Acute non perforated appendicitis s/p laparoscopic appendectomy (12/22/24)  POD# 0. Surgery by Dr. Rocha under general anesthesia, EBL 5 ml.  - General surgery service is managing.   --Defer analgesic/pain management, DVT prophylaxis, PT/OT  - HGB check on POD #1  - Encourage utilization of incentive spirometer.     DM II, insulin dependent, controlled  Diabetic neuropathy  *PTA insulin glargine 46 units HS, Actos 45 mg daily, Semaglutide 1 mg weekly  *A1c was 6.6 on 12/2/24  - Hold PTA Actos while hospitalized  - Reduce PTA insulin glargine to 30 units HS for tonight  - Accuchecks ACHS with sliding scale coverage  - Hypoglycemia protocol  - Continue PTA Gabapentin    HTN  - Continue PTA Enalapril  - Restart PTA Lasix 12/23    HLD  - Can resume PTA Atorvastatin on discharge    Schizophrenia  Insomnia  - Continue PTA Clozapine, Zyprexa, Trazodone, Venlafaxine    Disposition  - Discharge per primary team. She is stable for discharge from a hospitalist standpoint, whenever deemed appropriate by general surgery.    Clinically Significant Risk Factors Present on Admission                 # Drug Induced Platelet Defect: home medication list includes an antiplatelet medication   # Hypertension: Home medication list includes antihypertensive(s)           # Severe Obesity: Estimated body mass index is 49.57 kg/m  as calculated from the following:    Height as of this encounter: 1.651 m (5'  "5\").    Weight as of this encounter: 135.1 kg (297 lb 14.4 oz).                Diet: Advance Diet as Tolerated: Clear Liquid Diet; Moderate Consistent Carb (60 g CHO per Meal) Diet     DVT Prophylaxis: Defer to primary service   Yi Catheter: Not present  Lines: None     Cardiac Monitoring: None  Code Status: Full Code      Disposition Plan       Expected Discharge Date: 12/23/2024      Destination: home with family        Entered: Emily Bishop PA-C 12/22/2024, 12:46 PM        The patient's care was discussed with the Attending Physician, Dr. Ang and Patient.    The patient has been discussed with Dr. Ang, who agrees with the assessment and plan at this time.    At this time, I'd like to thank general surgery for consulting the Hospitalist service.  We will continue to follow.        Emily Bishop PA-C  [facility]  Securely message with the Vocera Web Console (learn more here)      ______________________________________________________________________    Chief Complaint   Abdominal pain    History is obtained from the patient and EMR.    History of Present Illness   Monica Miguel is a 59 year old female with PMH of DM II, HTN, HLD, insomnia, schizophrenia, neuropathy admitted to general surgery service on 12/21/2024 for acute appendicitis, s/p lap appendectomy 12/22. Hospitalist consult requested for medical management of comorbidities.    Patient reports pain is controlled. No nausea or vomiting. No dyspnea or chest pain. She is hungry and looking forward to eat. Has not taken her Lantus since 12/20 evening. Weaned back down to room air.    Past Medical History    I have reviewed this patient's medical history and updated it with pertinent information if needed.   Past Medical History:   Diagnosis Date    Atopic eczema     Bipolar 1 disorder (H)     COVID-19 12/22/2020    Diabetes mellitus, type II (H)     GERD (gastroesophageal reflux disease)     Hyperlipidemia     " Hypertension     Liver disease     Obesity     Schizophrenia (H)     Sleep apnea        Medications   Prior to Admission Medications   Prescriptions Last Dose Informant Patient Reported? Taking?   Blood Glucose Monitoring Suppl (BLOOD GLUCOSE MONITOR SYSTEM) w/Device KIT   Yes No   OLANZapine (ZYPREXA) 15 MG tablet   Yes No   Sig: Take 15 mg by mouth At Bedtime   Semaglutide, 1 MG/DOSE, (OZEMPIC) 4 MG/3ML pen   Yes No   Sig: Inject 1 mg subcutaneously every 7 days.   acetaminophen (TYLENOL) 500 MG tablet   Yes No   Sig: Take 500-1,000 mg by mouth every 6 hours as needed for mild pain   aspirin 81 MG EC tablet   Yes No   Sig: Take 81 mg by mouth daily.   atorvastatin (LIPITOR) 10 MG tablet   Yes No   Sig: Take 10 mg by mouth daily.   cholecalciferol (VITAMIN D3) 1250 mcg (98159 units) capsule   Yes No   Sig: Take 50,000 Units by mouth once a week   cloZAPine (CLOZARIL) 25 MG tablet   Yes No   Sig: Take 25 mg by mouth At Bedtime   enalapril (VASOTEC) 2.5 MG tablet   Yes No   Sig: Take 2.5 mg by mouth daily.   ferrous sulfate (FEROSUL) 325 (65 Fe) MG tablet   Yes No   Sig: Take 1 tablet by mouth daily   furosemide (LASIX) 20 MG tablet   Yes No   Sig: Take 20 mg by mouth daily   gabapentin (NEURONTIN) 600 MG tablet   Yes No   Sig: Take 600 mg by mouth 3 times daily    insulin glargine 100 UNIT/ML pen   Yes No   Sig: Inject 46 Units subcutaneously every morning.   pioglitazone (ACTOS) 45 MG tablet   Yes No   Sig: Take 45 mg by mouth daily   traZODone (DESYREL) 100 MG tablet   Yes No   Sig: Take 100 mg by mouth at bedtime.   venlafaxine (EFFEXOR-XR) 150 MG 24 hr capsule   Yes No   Sig: Take 150 mg by mouth daily      Facility-Administered Medications: None     Allergies   Allergies   Allergen Reactions    Other Environmental Allergy Unknown     pineapple    Penicillins Rash       Physical Exam   Vital Signs: Temp: 97.9  F (36.6  C) Temp src: Axillary BP: 134/64 Pulse: 72   Resp: 15 SpO2: 100 % O2 Device: Nasal cannula  Oxygen Delivery: 3 LPM  Weight: 297 lbs 14.4 oz    Constitutional: Awake, alert, cooperative, no apparent distress.    ENT: Normocephalic, without obvious abnormality, atraumatic. Normal sclera.    Neck: Supple, symmetrical, trachea midline  Pulmonary: No increased work of breathing, diminished  Cardiovascular: Regular rate and rhythm  Neuro: Oriented x 3. Moves all extremities spontaneously. No focal neuro deficits.  Psych:  Normal affect and mood  Extremities: Normal muscle tone. No gross deformities noted. Calves non-tender b/l. No pitting edema b/l LE    Medical Decision Making       40 MINUTES SPENT BY ME on the date of service doing chart review, history, exam, documentation & further activities per the note.         Data   Results for orders placed or performed during the hospital encounter of 12/21/24 (from the past 24 hours)   Glucose by meter   Result Value Ref Range    GLUCOSE BY METER POCT 116 (H) 70 - 99 mg/dL   Glucose by meter   Result Value Ref Range    GLUCOSE BY METER POCT 111 (H) 70 - 99 mg/dL   CBC with Platelets & Differential    Narrative    The following orders were created for panel order CBC with Platelets & Differential.  Procedure                               Abnormality         Status                     ---------                               -----------         ------                     CBC with platelets and d...[[id number]]  Abnormal            Final result                 Please view results for these tests on the individual orders.   CBC with platelets and differential   Result Value Ref Range    WBC Count 11.4 (H) 4.0 - 11.0 10e3/uL    RBC Count 4.04 3.80 - 5.20 10e6/uL    Hemoglobin 11.5 (L) 11.7 - 15.7 g/dL    Hematocrit 34.6 (L) 35.0 - 47.0 %    MCV 86 78 - 100 fL    MCH 28.5 26.5 - 33.0 pg    MCHC 33.2 31.5 - 36.5 g/dL    RDW 13.2 10.0 - 15.0 %    Platelet Count 276 150 - 450 10e3/uL    % Neutrophils 74 %    % Lymphocytes 18 %    % Monocytes 8 %    % Eosinophils 0 %    %  Basophils 0 %    % Immature Granulocytes 0 %    NRBCs per 100 WBC 0 <1 /100    Absolute Neutrophils 8.4 (H) 1.6 - 8.3 10e3/uL    Absolute Lymphocytes 2.0 0.8 - 5.3 10e3/uL    Absolute Monocytes 0.9 0.0 - 1.3 10e3/uL    Absolute Eosinophils 0.0 0.0 - 0.7 10e3/uL    Absolute Basophils 0.0 0.0 - 0.2 10e3/uL    Absolute Immature Granulocytes 0.0 <=0.4 10e3/uL    Absolute NRBCs 0.0 10e3/uL   Glucose by meter   Result Value Ref Range    GLUCOSE BY METER POCT 115 (H) 70 - 99 mg/dL   Glucose by meter   Result Value Ref Range    GLUCOSE BY METER POCT 144 (H) 70 - 99 mg/dL   Glucose by meter   Result Value Ref Range    GLUCOSE BY METER POCT 151 (H) 70 - 99 mg/dL

## 2024-12-22 NOTE — ED NOTES
Expected Patient Referral to ED  10:45 PM    Referring Clinic/Provider:  Giovany Kinney MD    Reason for referral/Clinical facts:  [facility]  59-year-old female with no reported past medical problems presenting with abdominal pain and seen at urgency room with CT scan that showed acute nonruptured appendicitis.  Patient given Rocephin and Flagyl and requested transfer to [facility] for definitive care.    Recommendations provided:  Send to ED for further evaluation    Caller was informed that this institution does possess the capabilities and/or resources to provide for patient and should be transferred to our facility.    Discussed that if direct admit is sought and any hurdles are encountered, this ED would be happy to see the patient and evaluate.    Informed caller that recommendations provided are recommendations based only on the facts provided and that they responsible to accept or reject the advice, or to seek a formal in person consultation as needed and that this ED will see/treat patient should they arrive.      Harpal Hernandez MD  [facility] EMERGENCY DEPARTMENT  [address] [place] [id number]  [phone]       Harpal Hernandez MD  12/21/24 2307

## 2024-12-22 NOTE — PLAN OF CARE
Goal Outcome Evaluation:      Plan of Care Reviewed With: patient    Overall Patient Progress: no changeOverall Patient Progress: no change      PRIMARY Concern: Abd pain, appendicitis  SAFETY RISK Concerns (fall risk, behaviors, etc.): NA      Aggression Tool Color: Green  Isolation/Type: NA  Tests/Procedures for NEXT shift: Lap appe  Consults? (Pending/following, signed-off?) surgery following   Where is patient from? (Home, TCU, etc.): Home  Other Important info for NEXT shift: Surgery scheduled at11AM  Anticipated DC date & active delays: 1-2 days  _____________________________________________________________________________  SUMMARY NOTE:  Orientation/Cognitive: A&Ox4  Observation Goals (Met/ Not Met): Not met  Mobility Level/Assist Equipment: IND  Antibiotics & Plan (IV/po, length of tx left): IV flagyl and IV cipro  Pain Management: 6/10 abdominal pain managed w/ PRN Toradol   Tele/VS/O2: VSS on RA  ABNL Lab/BG: B  Diet: NPO  Bowel/Bladder: Continent  Skin Concerns: WDL  Drains/Devices: PIV infusing NS@100ml/hr  Patient Stated Goal for Today: To sleep     Observation goals  PRIOR TO DISCHARGE        Comments: List all goals to be met before discharge home  - Nausea/vomiting (diarrhea if present) improved:N/A  - Tolerating oral intake to maintain hydration:N/A NPO  - Vital signs normal or at patient baseline and orthostatic vitals are normal and patient not lightheaded with standing:Met  - Diagnostic tests and consults completed and resulted if ordered:Met  - Adequate pain control on oral analgesia:met  - Tolerating oral antibiotics if ordered:N/A only IV abx ordered  - Safe disposition plan has been identified:Not met  - Nurse to notify provider when observation goals have been met and patient is ready for discharge.

## 2024-12-22 NOTE — H&P
"[facility]  [facility] - H&P     Moncia Miguel MRN# [id number]   Age: 59 year old YOB: 1965     HPI:  Monica Miguel  has been experiencing acute RLQ abdominal pain for the past 6 days associated with nausea and anorexia.  These symptoms have been increasing in severity. She was seen at outside facility where acute appendicitis was confirmed.      History is obtained from the patient    Review Of Systems:  Respiratory: No shortness of breath, dyspnea on exertion, cough, or hemoptysis  Cardiovascular: negative  Gastrointestinal: as above  Genitourinary: negative    PMH:  Past Medical History:   Diagnosis Date    Atopic eczema     Bipolar 1 disorder (H)     COVID-19 12/22/2020    Diabetes mellitus, type II (H)     GERD (gastroesophageal reflux disease)     Hyperlipidemia     Hypertension     Liver disease     Obesity     Schizophrenia (H)     Sleep apnea        PSH:  Past Surgical History:   Procedure Laterality Date    ANKLE FRACTURE SURGERY         Allergies:  Allergies   Allergen Reactions    Other Environmental Allergy Unknown     pineapple    Penicillins Rash       Home Medications:  Current Outpatient Medications   Medication Sig Dispense Refill    oxyCODONE (ROXICODONE) 5 MG tablet Take 1 tablet (5 mg) by mouth every 4 hours as needed. 15 tablet 0    senna-docusate (SENOKOT-S/PERICOLACE) 8.6-50 MG tablet Take 1 tablet by mouth 2 times daily. 15 tablet 0       Social History:  Social History     Tobacco Use    Smoking status: Former       Family History:  No family history chronic diarrhea, inflammatory bowel disease or colon cancer.No personal history of bleeding issues    Objective:  /53 (BP Location: Left arm)   Pulse 69   Temp 97.3  F (36.3  C) (Oral)   Resp 20   Ht 1.651 m (5' 5\")   Wt 135.1 kg (297 lb 14.4 oz)   SpO2 95%   BMI 49.57 kg/m      General appearance: healthy, alert, and mild distress, obese  Skin:  warm dry  Neck: normal and supple  Lungs: " breathing comfortably on room air, clear speech  Heart: regular rate and rhythm and no murmurs, clicks, or gallops  Abdomen: rounded, obese, and protuberant, soft.   Tenderness: present: periumbilical moderate  Masses: none  Organomegaly: none    Labs Reviewed:  Recent Labs     12/22/24  0644   HGB 11.5*   WBC 11.4*       Radiology:  CT abdomen reveals a dilated, thick-walled appendix with surrounding fat stranding.  All imaging studies reviewed by me.    ASSESSMENT/PLAN:  The patient's history, physical exam, laboratory and imaging studies are suspicious for acute appendicitis.  I have offered the patient laparoscopic appendectomy.  The risks, benefits, and alternatives have been discussed in detail.  All of the patient's questions have been answered.  They elect to proceed and we will go to the OR at the soonest availability.  Pre-operative antibiotics have been ordered.     Juarez Rocha MD

## 2024-12-22 NOTE — OP NOTE
General Surgery Operative Note    PREOPERATIVE DIAGNOSIS:  Acute appendicitis      POSTOPERATIVE DIAGNOSIS:  Acute non perforated appendicitis     PROCEDURE: LAPAROSCOPIC APPENDECTOMY     ANESTHESIA:  General    SURGEON:  Juarez Rocha MD    ASSISTANT:  Angie Brunson PA-C assisted in the above procedure. They provided assistance with pre-operative positioning, prepping, and draping of the patient. The assistant provided vital operative assistance with retraction using instruments thus providing the necessary exposure and visualization for the case, manipulation of tissues to achieve hemostasis, suction for visualization and assisted in closure of the wound. Post-operatively they assisted in transfer of the patient off the operative table and transition to the PACU physicians.    INDICATIONS:  Monica Miguel is a 59 year old female who presented to the Emergency Department with complaints of abdominal pain. The patient underwent a work-up including physical exam, abdominal CT scan and labs which all were most consistent with acute appendicitis. The decision was made for the patient to proceed to the operating room for laparoscopic removal of the appendix. The risks and benefits of the procedure were discussed with the patient and consent for the procedure was obtained.     PROCEDURE: The patient was brought to the preoperative area and preoperative antibiotics were administered. The patient was brought back to the operating room and placed in supine position on the operating table. The anesthesia was induced, and the patient was intubated. The patient was secured to the operating table and their pressure points were padded. The patient's abdomen was prepped and draped in sterile fashion. A time out was completed confirming patient, procedure, site of surgery and any special equipment needed for the procedure.     Following infiltration with local anesthetic, the 11 blade scalpel was used to made a small incision in the  patient's left upper quadrant. Entrance into the abdomen was then gained under direct visualization using the 5 mm scope with the 5 mm Visiport. When the abdomen had been safely entered, it was insufflated and the scope reinserted. Initial inspection revealed no evidence of injury at the port entry site. Under direct visualization, two additional ports were placed, one 12 mm bariatric length port in left lateral port, and one 5 mm bariatric length in left lower quadrant port. Graspers were inserted, and the patient's appendix was identified. The appendix was freed from surrounding tissue using the [place] ligasure. The appendix was found to be inflamed at the tip and densely adhered to the surrounding mesentary, and there was NO evidence of rupture. When the appendix had been freed, the [place] dissector was used to make a window at the base of the appendix. The laparoscopic stapler was brought into the field and inserted within the abdomen, and the appendix was transected at the base using a single firing of the blue load of the Endo BOBBY stapler. The mesoappendix was taken using [place] ligasure. When the appendix was then completely dissected free, it was placed in an Endo catch bag and removed through the patient's left lower quadrant port site. The ports were reinserted and the patient's right lower quadrant was inspected. The staple line along the cecum was found to be intact without evidence of bleeding. There was no bleeding from the mesoappendix.  Attention was then turned to the patient's left lateral port. The 12 mm port was removed and there was no evidence of bleeding at the port exit site.  The fascia at the port site was reapproximated using an 0 Vicryl stitch and the Leandro-Rui fascial closure device. The patient's left lower quadrant port was then removed under direct visualization. There was no evidence of bleeding at the port exit site. The patient's abdomen was then allowed to desufflate  fully. The port sites were inspected, and hemostasis was obtained using electrocautery. The port sites were then reapproximated using 4-0 Monocryl subcuticular stitches. The incisions were washed and dried, and sterile dressings were applied. The lap, needle and instrument counts were correct at the end of the procedure. The patient tolerated the procedure well and was extubated and taken to the recovery area in stable condition.     ESTIMATED BLOOD LOSS:  5cc    INTRAOPERATIVE FINDINGS:  Acute appendicitis without evidence of rupture.     SPECIMENS: Appendix     DRAINS: None    CONDITION: Stable     Juarez Rocha MD

## 2024-12-22 NOTE — PROGRESS NOTES
List all goals to be met before discharge home  - Nausea/vomiting (diarrhea if present) improved.met  - Tolerating oral intake to maintain hydration met  - Vital signs normal or at patient baseline and orthostatic vitals are normal and patient not lightheaded with standing met  - Diagnostic tests and consults completed and resulted if ordered not met, surgery s/o pending  - Adequate pain control on oral analgesia met  - Tolerating oral antibiotics if ordered NA  - Safe disposition plan has been identified met

## 2024-12-22 NOTE — ANESTHESIA CARE TRANSFER NOTE
Patient: Monica Miguel    Procedure: Procedure(s):  APPENDECTOMY, LAPAROSCOPIC       Diagnosis: Acute appendicitis with localized peritonitis, without perforation, abscess, or gangrene [K35.30]  Diagnosis Additional Information: No value filed.    Anesthesia Type:   General     Note:    Oropharynx: oropharynx clear of all foreign objects and spontaneously breathing  Level of Consciousness: awake  Oxygen Supplementation: face mask  Level of Supplemental Oxygen (L/min / FiO2): 10  Independent Airway: airway patency satisfactory and stable  Dentition: dentition unchanged  Vital Signs Stable: post-procedure vital signs reviewed and stable  Report to RN Given: handoff report given  Patient transferred to: PACU  Comments: Pt awake and able to verbalize needs. ALL monitors on and audible. Spontaneous respiration without difficulty. Pt denies pain. Report given to PACU RN.  Handoff Report: Identifed the Patient, Identified the Reponsible Provider, Reviewed the pertinent medical history, Discussed the surgical course, Reviewed Intra-OP anesthesia mangement and issues during anesthesia, Set expectations for post-procedure period and Allowed opportunity for questions and acknowledgement of understanding      Vitals:  Vitals Value Taken Time   /58 12/22/24 1035   Temp     Pulse 81 12/22/24 1037   Resp 8 12/22/24 1037   SpO2 98 % 12/22/24 1037   Vitals shown include unfiled device data.    Electronically Signed By: DMITRIY Friedman CRNA  December 22, 2024  10:39 AM

## 2024-12-23 VITALS
DIASTOLIC BLOOD PRESSURE: 66 MMHG | WEIGHT: 293 LBS | RESPIRATION RATE: 18 BRPM | OXYGEN SATURATION: 90 % | TEMPERATURE: 98.2 F | BODY MASS INDEX: 48.82 KG/M2 | HEART RATE: 90 BPM | SYSTOLIC BLOOD PRESSURE: 130 MMHG | HEIGHT: 65 IN

## 2024-12-23 LAB
ANION GAP SERPL CALCULATED.3IONS-SCNC: 8 MMOL/L (ref 7–15)
ATRIAL RATE - MUSE: 57 BPM
BUN SERPL-MCNC: 11.2 MG/DL (ref 6–20)
CALCIUM SERPL-MCNC: 8.6 MG/DL (ref 8.8–10.4)
CHLORIDE SERPL-SCNC: 101 MMOL/L (ref 98–107)
CREAT SERPL-MCNC: 0.61 MG/DL (ref 0.51–0.95)
DIASTOLIC BLOOD PRESSURE - MUSE: NORMAL MMHG
EGFRCR SERPLBLD CKD-EPI 2021: >90 ML/MIN/1.73M2
GLUCOSE BLDC GLUCOMTR-MCNC: 159 MG/DL (ref 70–99)
GLUCOSE BLDC GLUCOMTR-MCNC: 224 MG/DL (ref 70–99)
GLUCOSE SERPL-MCNC: 164 MG/DL (ref 70–99)
HCO3 SERPL-SCNC: 27 MMOL/L (ref 22–29)
HGB BLD-MCNC: 11.7 G/DL (ref 11.7–15.7)
INTERPRETATION ECG - MUSE: NORMAL
P AXIS - MUSE: NORMAL DEGREES
POTASSIUM SERPL-SCNC: 4.6 MMOL/L (ref 3.4–5.3)
PR INTERVAL - MUSE: 104 MS
QRS DURATION - MUSE: 76 MS
QT - MUSE: 448 MS
QTC - MUSE: 436 MS
R AXIS - MUSE: 26 DEGREES
SODIUM SERPL-SCNC: 136 MMOL/L (ref 135–145)
SYSTOLIC BLOOD PRESSURE - MUSE: NORMAL MMHG
T AXIS - MUSE: 40 DEGREES
VENTRICULAR RATE- MUSE: 57 BPM

## 2024-12-23 PROCEDURE — 250N000013 HC RX MED GY IP 250 OP 250 PS 637: Performed by: PHYSICIAN ASSISTANT

## 2024-12-23 PROCEDURE — G0378 HOSPITAL OBSERVATION PER HR: HCPCS

## 2024-12-23 PROCEDURE — 80048 BASIC METABOLIC PNL TOTAL CA: CPT | Performed by: PHYSICIAN ASSISTANT

## 2024-12-23 PROCEDURE — 36415 COLL VENOUS BLD VENIPUNCTURE: CPT | Performed by: PHYSICIAN ASSISTANT

## 2024-12-23 PROCEDURE — 96376 TX/PRO/DX INJ SAME DRUG ADON: CPT

## 2024-12-23 PROCEDURE — 99214 OFFICE O/P EST MOD 30 MIN: CPT | Performed by: HOSPITALIST

## 2024-12-23 PROCEDURE — 250N000011 HC RX IP 250 OP 636: Performed by: PHYSICIAN ASSISTANT

## 2024-12-23 PROCEDURE — 82962 GLUCOSE BLOOD TEST: CPT

## 2024-12-23 PROCEDURE — 85018 HEMOGLOBIN: CPT | Performed by: PHYSICIAN ASSISTANT

## 2024-12-23 RX ADMIN — SENNOSIDES AND DOCUSATE SODIUM 1 TABLET: 50; 8.6 TABLET ORAL at 08:00

## 2024-12-23 RX ADMIN — ENALAPRIL MALEATE 2.5 MG: 2.5 TABLET ORAL at 09:12

## 2024-12-23 RX ADMIN — FUROSEMIDE 20 MG: 20 TABLET ORAL at 08:00

## 2024-12-23 RX ADMIN — ACETAMINOPHEN 975 MG: 325 TABLET, FILM COATED ORAL at 08:00

## 2024-12-23 RX ADMIN — CIPROFLOXACIN 400 MG: 2 INJECTION, SOLUTION INTRAVENOUS at 01:18

## 2024-12-23 RX ADMIN — SIMETHICONE 80 MG: 80 TABLET, CHEWABLE ORAL at 08:00

## 2024-12-23 RX ADMIN — METRONIDAZOLE 500 MG: 500 INJECTION, SOLUTION INTRAVENOUS at 06:28

## 2024-12-23 RX ADMIN — INSULIN ASPART 1 UNITS: 100 INJECTION, SOLUTION INTRAVENOUS; SUBCUTANEOUS at 09:12

## 2024-12-23 RX ADMIN — GABAPENTIN 600 MG: 600 TABLET, FILM COATED ORAL at 08:00

## 2024-12-23 ASSESSMENT — ACTIVITIES OF DAILY LIVING (ADL)
ADLS_ACUITY_SCORE: 43

## 2024-12-23 NOTE — PROGRESS NOTES
PRIMARY Concern: Abd pain, appendicitis, s/p Lap appe today  SAFETY RISK Concerns (fall risk, behaviors, etc.): Falls  Aggression Tool Color: Green  Isolation/Type: NA  Tests/Procedures for NEXT shift: BG monitoring  Consults? (Pending/following, signed-off?) Surgery is primary, hospitalist following and s/o  Where is patient from? (Home, TCU, etc.): Home  Other Important info for NEXT shift: none  Anticipated DC date & active delays: possibly tomorrow, please contact surgery for discharge orders.  _____________________________________________________________________________  SUMMARY NOTE:  Orientation/Cognitive: A&Ox4  Observation Goals (Met/ Not Met): Not met  Mobility Level/Assist Equipment: SBA  Antibiotics & Plan (IV/po, length of tx left): IV flagyl and IV cipro  Pain Management: Oxy and Tylenol for abd pain, effective  Tele/VS/O2: VSS on RA  ABNL Lab/BG: B  Diet: tolerating regulars  Bowel/Bladder: Continent  Skin Concerns: 3 lap sites c/d/i  Drains/Devices: PIV infusing NS@100ml/hr, please SL once the bag is complete  Patient Stated Goal for Today: Pain managements

## 2024-12-23 NOTE — PLAN OF CARE
Goal Outcome Evaluation:      Plan of Care Reviewed With: patient    Overall Patient Progress: no change    PRIMARY Concern: Abd pain, appendicitis, POD 1 from lap appy.  SAFETY RISK Concerns (fall risk, behaviors, etc.): NA      Aggression Tool Color: Green  Isolation/Type: NA  Tests/Procedures for NEXT shift: Hgb recheck  Consults? (Pending/following, signed-off?) Surgery is primary, Hospitalist is following  Where is patient from? (Home, TCU, etc.): Home  Other Important info for NEXT shift: encourage IS  Anticipated DC date & active delays: Likely discharge today. Must get discharge order from Surgery team.  _____________________________________________________________________________  SUMMARY NOTE:  Orientation/Cognitive: A&Ox4  Observation Goals (Met/ Not Met): Met  Mobility Level/Assist Equipment: IND  Antibiotics & Plan (IV/po, length of tx left): IV flagyl and IV cipro q12 hrs.  Pain Management: Denies pain at rest, worse when getting up, declined medications.  Tele/VS/O2: VSS on RA  ABNL Lab/BG: B  Diet: Regular  Bowel/Bladder: Continent  Skin Concerns: 3 lap sites to Left side of abdomen, small amount of dried drainage to lowest site.  Abdomen rounded, distended. Baseline neuropathy to BLE.  Drains/Devices: PIV S.L.  Patient Stated Goal for Today: To sleep

## 2024-12-23 NOTE — PROGRESS NOTES
List all goals to be met before discharge home  - Nausea/vomiting (diarrhea if present) improved:met  - Tolerating oral intake to maintain hydration: met  - Vital signs normal or at patient baseline and orthostatic vitals are normal and patient not lightheaded with standing: met  - Diagnostic tests and consults completed and resulted if ordered not met, surgery s/o pending  - Adequate pain control on oral analgesia: met  - Tolerating oral antibiotics if ordered NA  - Safe disposition plan has been identified met

## 2024-12-23 NOTE — DISCHARGE SUMMARY
"Discharge Summary    Monica Miguel MRN# [id number]   YOB: 1965 Age: 59 year old     Date of Admission:  12/21/2024  Date of Discharge:  12/23/2024  2:37 PM  Admitting Physician:  Juarez Rocha MD  Discharging Service:  Surgery  Primary Provider: Shiloh Reyes         Admission/Discharge Diagnosis:   Principle Diagnosis:   Acute appendicitis  Secondary diagnosis: None         Procedures:   Procedure(s):  APPENDECTOMY, LAPAROSCOPIC         Brief History of Illness:   This patient was a 59 year old female who presented with RLQ abdominal pain and associated nausea.  She was found to have leukocytosis and her imaging was consistent with appendicitis.  After discussing the risks, benefits, and possible complications, an informed consent was obtained and the patient underwent the above procedure. Please see the Operative Report for full details.         Hospital Course:   The patient recovered as anticipated.  She stayed overnight due to her multiple medical co-morbidities.  The patient's was tolerating oral intake and their pain was controlled on day of discharge.  The patient was discharged home in stable condition by the surgical service when cleared by medicine   She verbalized understanding of all discharge instructions.  She was asked to call with any further questions or concerns.  Please see chart for details.          Consultations:     Consultation during this admission received from internal medicine.            Discharge Disposition:     Discharged to home          Condition on Discharge:     Discharge condition: Stable   Discharge vitals: Blood pressure 130/66, pulse 90, temperature 98.2  F (36.8  C), temperature source Oral, resp. rate 18, height 1.651 m (5' 5\"), weight 139.1 kg (306 lb 11.2 oz), SpO2 90%.               Discharge Medications:     Current Discharge Medication List        START taking these medications    Details   oxyCODONE (ROXICODONE) 5 MG tablet Take 1 tablet (5 mg) by mouth " every 4 hours as needed.  Qty: 15 tablet, Refills: 0    Associated Diagnoses: Acute post-operative pain      senna-docusate (SENOKOT-S/PERICOLACE) 8.6-50 MG tablet Take 1 tablet by mouth 2 times daily.  Qty: 15 tablet, Refills: 0    Associated Diagnoses: Acute post-operative pain           CONTINUE these medications which have NOT CHANGED    Details   acetaminophen (TYLENOL) 500 MG tablet Take 500-1,000 mg by mouth every 6 hours as needed for mild pain      aspirin 81 MG EC tablet Take 81 mg by mouth at bedtime.      atorvastatin (LIPITOR) 10 MG tablet Take 10 mg by mouth at bedtime.      cholecalciferol (VITAMIN D3) 1250 mcg (99057 units) capsule Take 50,000 Units by mouth once a week. On Fridays      cloZAPine (CLOZARIL) 25 MG tablet Take 25 mg by mouth At Bedtime      enalapril (VASOTEC) 2.5 MG tablet Take 2.5 mg by mouth daily.      ferrous sulfate (FEROSUL) 325 (65 Fe) MG tablet Take 1 tablet by mouth at bedtime.      furosemide (LASIX) 20 MG tablet Take 20 mg by mouth 2 times daily.      gabapentin (NEURONTIN) 600 MG tablet Take 600 mg by mouth 3 times daily       insulin glargine 100 UNIT/ML pen Inject 46 Units subcutaneously at bedtime.      OLANZapine (ZYPREXA) 15 MG tablet Take 15 mg by mouth At Bedtime      pioglitazone (ACTOS) 45 MG tablet Take 45 mg by mouth daily      Semaglutide, 1 MG/DOSE, (OZEMPIC) 4 MG/3ML pen Inject 1 mg subcutaneously every 7 days. On Fridays      traZODone (DESYREL) 100 MG tablet Take 100 mg by mouth at bedtime.      venlafaxine (EFFEXOR-XR) 150 MG 24 hr capsule Take 150 mg by mouth at bedtime.      Blood Glucose Monitoring Suppl (BLOOD GLUCOSE MONITOR SYSTEM) w/Device KIT                 Discharge Instructions:     After Care Instructions       NO Follow-up Care Needed      See attached Surgery Discharge Instruction Sheet for further information.    Follow up with your PCP in 2 weeks.                Angie Brunson PA-C, dictating on behalf of Juarez Rocha MD  Office #:  [phone]

## 2024-12-23 NOTE — PROGRESS NOTES
"[facility]  GENERAL SURGERY Progress Note    Admission Date: 2024         Assessment and Plan:     Monica Miguel is a 59 year old female s/p lap appy, POD 1  - ADAT  - Pain control- Tylenol ATC, Oxy PRN  - Pharmacy notes drug interaction, discussed with Dr. Ang, no plans for EKG if Cipro discontinued  - Ambulate 4x day and encourage IS  - Med management per hospitalist, much appreciate your assistance- ok for discharge  - Discharge home, needs PCP follow up for medical problems             Interval History:     Patient denies any CP.  Pain controlled, tolerating diet, +Flatus/-BM, UO adequate, ambulating.                     Physical Exam:   Blood pressure 130/66, pulse 90, temperature 98.2  F (36.8  C), temperature source Oral, resp. rate 18, height 1.651 m (5' 5\"), weight 139.1 kg (306 lb 11.2 oz), SpO2 90%.  Temperature Temp  Av.5  F (36.4  C)  Min: 97  F (36.1  C)  Max: 98.2  F (36.8  C)   I/O last 3 completed shifts:  In: 2430 [P.O.:780; I.V.:1400]  Out: 650 [Urine:650]  Constitutional:  Awake and in no apparent distress.   Lungs: No increased work of breathing.   Cardiovascular: Regular rate and rhythm.   Abdomen: Soft, non-distended, appropriately tender at incision(s).   Wound(s): Clean, dry, and intact. No erythema or drainage.    Extremities: B/L LE edema with stasis changes. Pt reports this is not new. No calf tenderness.           Data:     Recent Labs   Lab Test 24  0846 24  0644 20  0729 20  0716   WBC  --  11.4* 6.8 5.5   HGB 11.7 11.5* 12.7 12.6   HCT  --  34.6* 39.8 39.4   PLT  --  276 410 353      Recent Labs   Lab Test 24  0846 24  1450 24  1204    138 135   POTASSIUM 4.6 4.4 4.4   CHLORIDE 101 100 97*   CO2 27 29 26   BUN 11.2 19.0 19.2   CR 0.61 0.60 0.60     Angie Brunson PA-C  [facility]  [phone]   "

## 2024-12-23 NOTE — PROGRESS NOTES
PRIMARY DIAGNOSIS: Appendicitis  OUTPATIENT/OBSERVATION GOALS TO BE MET BEFORE DISCHARGE:  ADLs back to baseline: No    Activity and level of assistance: Up with standby assistance.    Pain status: Improved-controlled with oral pain medications.    Return to near baseline physical activity: Yes     Discharge Planner Nurse   Safe discharge environment identified: Yes  Barriers to discharge: Yes       Entered by: Nika Grossman RN 12/23/2024 2:00 AM     Please review provider order for any additional goals.   Nurse to notify provider when observation goals have been met and patient is ready for discharge.

## 2024-12-23 NOTE — PROGRESS NOTES
[facility]    Medicine Progress Note - Hospitalist Service    Date of Admission:  12/21/2024    Assessment & Plan     Monica Miguel is a 59 year old female with PMH of DM II, HTN, HLD, insomnia, schizophrenia, neuropathy admitted to general surgery service on 12/21/2024 for acute appendicitis, s/p lap appendectomy 12/22. Hospitalist consult requested for medical management of comorbidities.     Acute non perforated appendicitis s/p laparoscopic appendectomy (12/22/24)  POD# 1. Surgery by Dr. Rocha under general anesthesia, EBL 5 ml.  - General surgery service is managing.               --Pain management, DVT prophylaxis, IVF, diet per primary   - Encourage utilization of incentive spirometer.   - on Abx 24 hours post op, discontinued.      DM II, insulin dependent, controlled  Diabetic neuropathy  *PTA insulin glargine 46 units HS, Actos 45 mg daily, Semaglutide 1 mg weekly  *A1c was 6.6 on 12/2/24  -  PTA Actos held while hospitalized  - Reduced PTA insulin glargine to 30 units HS after surgery as expected decreased PO. BS elevated, resume PTA dose at discharge tonight.   - Accuchecks ACHS with sliding scale coverage  - Hypoglycemia protocol  - Continued with PTA Gabapentin     HTN  Prolonged QTc  - Continued PTA Enalapril  - Restarted PTA Lasix 12/23  -QTc  467, and on repeat improved 436     HLD  - Resuming PTA Atorvastatin on discharge     Schizophrenia  Insomnia  - Continued PTA Clozapine, Zyprexa, Trazodone, Venlafaxine        Diet: Regular Diet Adult    DVT Prophylaxis: Defer to primary service  Yi Catheter: Not present  Lines: None     Cardiac Monitoring: None  Code Status: Full Code      Clinically Significant Risk Factors Present on Admission                 # Drug Induced Platelet Defect: home medication list includes an antiplatelet medication   # Hypertension: Home medication list includes antihypertensive(s)           # Severe Obesity: Estimated body mass index is 51.04 kg/m   "as calculated from the following:    Height as of this encounter: 1.651 m (5' 5\").    Weight as of this encounter: 139.1 kg (306 lb 11.2 oz).              Social Drivers of Health    Tobacco Use: Medium Risk (12/12/2024)    Patient History     Smoking Tobacco Use: Former     Smokeless Tobacco Use: Unknown          Disposition Plan     Medically Ready for Discharge: Anticipated Today OK for discharge. Discussed with primary service.              Sharif Ang MD  Hospitalist Service  [facility]  Securely message with [employer] (more info)  Text page via [employer] Paging/Directory   ______________________________________________________________________    Interval History   Chart reviewed and discussed with patient and her  at bedside. Ongoing post op pain, no nausea. No other acute issues reported.     Physical Exam   Vital Signs: Temp: 98.2  F (36.8  C) Temp src: Oral BP: 130/66 Pulse: 90   Resp: 18 SpO2: 90 % O2 Device: None (Room air) Oxygen Delivery: 3 LPM  Weight: 306 lbs 11.2 oz    General: AAOx3, appears comfortable.  HEENT: PERRLA EOMI. Mucosa moist.   Lungs: Bilateral equal air entry. Clear to auscultation, normal work of breathing.   CVS: S1S2 regular, no tachycardia or murmur.   Abdomen: Soft, obese, appropriate post op tenderness. BS heard.  MSK: No edema or deformities.  Neuro: AAOX3. CN 2-12 normal. Strength symmetrical.  Skin: No rash.       Medical Decision Making       25 MINUTES SPENT BY ME on the date of service doing chart review, history, exam, documentation & further activities per the note.      Data     I have personally reviewed the following data over the past 24 hrs:    N/A  \   11.7   / N/A     136 101 11.2 /  164 (H)   4.6 27 0.61 \       Imaging results reviewed over the past 24 hrs:   No results found for this or any previous visit (from the past 24 hours).  "

## 2024-12-23 NOTE — PLAN OF CARE
PRIMARY Concern: appendicitis   SAFETY RISK Concerns (fall risk, behaviors, etc.): none      Aggression Tool Color: green  Isolation/Type: none  Tests/Procedures for NEXT shift: none  Consults? (Pending/following, signed-off?) gen surg following  Where is patient from? (Home, TCU, etc.): home, lives with   Other Important info for NEXT shift: pt's  will transport pt home  Anticipated DC date & active delays: 12/23  _____________________________________________________________________________  SUMMARY NOTE:  Orientation/Cognitive: A&Ox4  Observation Goals (Met/ Not Met): met  Mobility Level/Assist Equipment: independent  Antibiotics & Plan (IV/po, length of tx left): IV cipro and IV flagyl while in hospital  Pain Management: reports minimal pain  Tele/VS/O2: VSS  ABNL Lab/BG:   Diet: regular  Bowel/Bladder: continent  Skin Concerns: 3 lap sites, dried drainage on lower incision  Drains/Devices: none  Patient Stated Goal for Today: discharge    PRIMARY DIAGNOSIS: Appendicitis  OUTPATIENT/OBSERVATION GOALS TO BE MET BEFORE DISCHARGE:  ADLs back to baseline: Yes     Activity and level of assistance: independent     Pain status: Met     Return to near baseline physical activity: Yes             Discharge Planner Nurse  Safe discharge environment identified: Yes  Barriers to discharge: No    Please review provider order for any additional goals.   Nurse to notify provider when observation goals have been met and patient is ready for discharge.

## 2024-12-23 NOTE — PLAN OF CARE
Discharge instructions and education reviewed, medication schedule and follow up appts discussed with pt and . Pt had no questions. All belongings sent with pt. Discharge medications were filled and sent with pt. Pt educated on medications.

## 2024-12-23 NOTE — PROGRESS NOTES
PRIMARY Concern: Abdominal Pain, Appendicitis, s/p Lap Appe today  SAFETY RISK Concerns (fall risk, behaviors, etc.): Fall      Aggression Tool Color:  Green  Isolation/Type:  NA  Tests/Procedures for NEXT shift: BG check, BMP, Hgb schedule in morning  Consults? (Pending/following, signed-off?) Surgery is primary  Where is patient from? (Home, TCU, etc.): Home  Other Important info for NEXT shift: None  Anticipated DC date & active delays:  Possible tomorrow, Please contact surgery for discharge order  _____________________________________________________________________________  SUMMARY NOTE:  Orientation/Cognitive:  A&O x4  Observation Goals (Met/ Not Met): Not met  Mobility Level/Assist Equipment:  SBA  Antibiotics & Plan (IV/po, length of tx left): On IV Flagyl and IV Cipro Q 12 hours  Pain Management:  Schedule Tylenol given for pain management  Tele/VS/O2: NA  ABNL Lab/BG:   Diet:  Regular  Bowel/Bladder:  Conitent  Skin Concerns:  3 lap sites CDI  Drains/Devices: PIV infusing NS at 100 ml/hr.   Patient Stated Goal for Today: Pain managements

## 2024-12-26 LAB
PATH REPORT.COMMENTS IMP SPEC: NORMAL
PATH REPORT.COMMENTS IMP SPEC: NORMAL
PATH REPORT.FINAL DX SPEC: NORMAL
PATH REPORT.GROSS SPEC: NORMAL
PATH REPORT.MICROSCOPIC SPEC OTHER STN: NORMAL
PATH REPORT.RELEVANT HX SPEC: NORMAL
PHOTO IMAGE: NORMAL

## 2024-12-26 PROCEDURE — 88304 TISSUE EXAM BY PATHOLOGIST: CPT | Mod: 26 | Performed by: PATHOLOGY

## 2024-12-30 LAB
ABO + RH BLD: NORMAL
ALBUMIN UR-MCNC: 30 MG/DL
ANION GAP SERPL CALCULATED.3IONS-SCNC: 15 MMOL/L (ref 7–15)
APPEARANCE UR: CLEAR
BASOPHILS # BLD AUTO: 0 10E3/UL (ref 0–0.2)
BASOPHILS NFR BLD AUTO: 0 %
BILIRUB UR QL STRIP: NEGATIVE
BLD GP AB SCN SERPL QL: NEGATIVE
BUN SERPL-MCNC: 19.2 MG/DL (ref 6–20)
CALCIUM SERPL-MCNC: 11.1 MG/DL (ref 8.8–10.4)
CHLORIDE SERPL-SCNC: 84 MMOL/L (ref 98–107)
COLOR UR AUTO: YELLOW
CREAT SERPL-MCNC: 0.59 MG/DL (ref 0.51–0.95)
EGFRCR SERPLBLD CKD-EPI 2021: >90 ML/MIN/1.73M2
EOSINOPHIL # BLD AUTO: 0 10E3/UL (ref 0–0.7)
EOSINOPHIL NFR BLD AUTO: 0 %
ERYTHROCYTE [DISTWIDTH] IN BLOOD BY AUTOMATED COUNT: 12.9 % (ref 10–15)
FLUAV RNA SPEC QL NAA+PROBE: NEGATIVE
FLUBV RNA RESP QL NAA+PROBE: NEGATIVE
GLUCOSE SERPL-MCNC: 249 MG/DL (ref 70–99)
GLUCOSE UR STRIP-MCNC: >1000 MG/DL
HCO3 SERPL-SCNC: 30 MMOL/L (ref 22–29)
HCT VFR BLD AUTO: 41.1 % (ref 35–47)
HGB BLD-MCNC: 13.7 G/DL (ref 11.7–15.7)
HGB UR QL STRIP: ABNORMAL
HOLD SPECIMEN: NORMAL
HOLD SPECIMEN: NORMAL
IMM GRANULOCYTES # BLD: 0.1 10E3/UL
IMM GRANULOCYTES NFR BLD: 0 %
KETONES UR STRIP-MCNC: 100 MG/DL
LEUKOCYTE ESTERASE UR QL STRIP: NEGATIVE
LYMPHOCYTES # BLD AUTO: 0.5 10E3/UL (ref 0.8–5.3)
LYMPHOCYTES NFR BLD AUTO: 4 %
MCH RBC QN AUTO: 27.2 PG (ref 26.5–33)
MCHC RBC AUTO-ENTMCNC: 33.3 G/DL (ref 31.5–36.5)
MCV RBC AUTO: 82 FL (ref 78–100)
MONOCYTES # BLD AUTO: 0.5 10E3/UL (ref 0–1.3)
MONOCYTES NFR BLD AUTO: 4 %
MUCOUS THREADS #/AREA URNS LPF: PRESENT /LPF
NEUTROPHILS # BLD AUTO: 12.1 10E3/UL (ref 1.6–8.3)
NEUTROPHILS NFR BLD AUTO: 92 %
NITRATE UR QL: NEGATIVE
NRBC # BLD AUTO: 0 10E3/UL
NRBC BLD AUTO-RTO: 0 /100
PH UR STRIP: 5.5 [PH] (ref 5–7)
PLATELET # BLD AUTO: 484 10E3/UL (ref 150–450)
POTASSIUM SERPL-SCNC: 3.6 MMOL/L (ref 3.4–5.3)
RBC # BLD AUTO: 5.04 10E6/UL (ref 3.8–5.2)
RBC URINE: >182 /HPF
RSV RNA SPEC NAA+PROBE: NEGATIVE
SARS-COV-2 RNA RESP QL NAA+PROBE: NEGATIVE
SODIUM SERPL-SCNC: 129 MMOL/L (ref 135–145)
SP GR UR STRIP: 1.03 (ref 1–1.03)
SPECIMEN EXP DATE BLD: NORMAL
SQUAMOUS EPITHELIAL: 1 /HPF
TRANSITIONAL EPI: <1 /HPF
UROBILINOGEN UR STRIP-MCNC: <2 MG/DL
WBC # BLD AUTO: 13.1 10E3/UL (ref 4–11)
WBC URINE: 1 /HPF

## 2024-12-30 PROCEDURE — 85014 HEMATOCRIT: CPT | Performed by: FAMILY MEDICINE

## 2024-12-30 PROCEDURE — 83935 ASSAY OF URINE OSMOLALITY: CPT | Performed by: STUDENT IN AN ORGANIZED HEALTH CARE EDUCATION/TRAINING PROGRAM

## 2024-12-30 PROCEDURE — 84300 ASSAY OF URINE SODIUM: CPT | Performed by: STUDENT IN AN ORGANIZED HEALTH CARE EDUCATION/TRAINING PROGRAM

## 2024-12-30 PROCEDURE — 96374 THER/PROPH/DIAG INJ IV PUSH: CPT | Mod: 59

## 2024-12-30 PROCEDURE — 83735 ASSAY OF MAGNESIUM: CPT | Performed by: HOSPITALIST

## 2024-12-30 PROCEDURE — 96361 HYDRATE IV INFUSION ADD-ON: CPT

## 2024-12-30 PROCEDURE — 86900 BLOOD TYPING SEROLOGIC ABO: CPT | Performed by: FAMILY MEDICINE

## 2024-12-30 PROCEDURE — 84075 ASSAY ALKALINE PHOSPHATASE: CPT | Performed by: HOSPITALIST

## 2024-12-30 PROCEDURE — 83880 ASSAY OF NATRIURETIC PEPTIDE: CPT | Performed by: HOSPITALIST

## 2024-12-30 PROCEDURE — 258N000003 HC RX IP 258 OP 636: Performed by: FAMILY MEDICINE

## 2024-12-30 PROCEDURE — 81001 URINALYSIS AUTO W/SCOPE: CPT | Performed by: EMERGENCY MEDICINE

## 2024-12-30 PROCEDURE — 80048 BASIC METABOLIC PNL TOTAL CA: CPT | Performed by: FAMILY MEDICINE

## 2024-12-30 PROCEDURE — 82310 ASSAY OF CALCIUM: CPT | Performed by: FAMILY MEDICINE

## 2024-12-30 PROCEDURE — 99285 EMERGENCY DEPT VISIT HI MDM: CPT | Mod: 25

## 2024-12-30 PROCEDURE — 250N000013 HC RX MED GY IP 250 OP 250 PS 637: Performed by: EMERGENCY MEDICINE

## 2024-12-30 PROCEDURE — 86850 RBC ANTIBODY SCREEN: CPT | Performed by: FAMILY MEDICINE

## 2024-12-30 PROCEDURE — 84155 ASSAY OF PROTEIN SERUM: CPT | Performed by: HOSPITALIST

## 2024-12-30 PROCEDURE — 87637 SARSCOV2&INF A&B&RSV AMP PRB: CPT | Performed by: EMERGENCY MEDICINE

## 2024-12-30 PROCEDURE — 36415 COLL VENOUS BLD VENIPUNCTURE: CPT | Performed by: FAMILY MEDICINE

## 2024-12-30 PROCEDURE — 250N000011 HC RX IP 250 OP 636: Performed by: EMERGENCY MEDICINE

## 2024-12-30 PROCEDURE — 82247 BILIRUBIN TOTAL: CPT | Performed by: HOSPITALIST

## 2024-12-30 PROCEDURE — 85025 COMPLETE CBC W/AUTO DIFF WBC: CPT | Performed by: FAMILY MEDICINE

## 2024-12-30 RX ORDER — OXYCODONE HYDROCHLORIDE 5 MG/1
5 TABLET ORAL ONCE
Status: COMPLETED | OUTPATIENT
Start: 2024-12-30 | End: 2024-12-30

## 2024-12-30 RX ORDER — ONDANSETRON 2 MG/ML
4 INJECTION INTRAMUSCULAR; INTRAVENOUS ONCE
Status: COMPLETED | OUTPATIENT
Start: 2024-12-30 | End: 2024-12-30

## 2024-12-30 RX ORDER — ACETAMINOPHEN 325 MG/1
975 TABLET ORAL ONCE
Status: DISCONTINUED | OUTPATIENT
Start: 2024-12-30 | End: 2024-12-30

## 2024-12-30 RX ADMIN — SODIUM CHLORIDE 1000 ML: 9 INJECTION, SOLUTION INTRAVENOUS at 22:19

## 2024-12-30 RX ADMIN — OXYCODONE HYDROCHLORIDE 5 MG: 5 TABLET ORAL at 22:14

## 2024-12-30 RX ADMIN — ONDANSETRON 4 MG: 2 INJECTION INTRAMUSCULAR; INTRAVENOUS at 22:17

## 2024-12-30 ASSESSMENT — COLUMBIA-SUICIDE SEVERITY RATING SCALE - C-SSRS
2. HAVE YOU ACTUALLY HAD ANY THOUGHTS OF KILLING YOURSELF IN THE PAST MONTH?: NO
1. IN THE PAST MONTH, HAVE YOU WISHED YOU WERE DEAD OR WISHED YOU COULD GO TO SLEEP AND NOT WAKE UP?: NO
6. HAVE YOU EVER DONE ANYTHING, STARTED TO DO ANYTHING, OR PREPARED TO DO ANYTHING TO END YOUR LIFE?: NO

## 2024-12-31 ENCOUNTER — APPOINTMENT (OUTPATIENT)
Dept: RADIOLOGY | Facility: HOSPITAL | Age: 59
DRG: 394 | End: 2024-12-31
Attending: STUDENT IN AN ORGANIZED HEALTH CARE EDUCATION/TRAINING PROGRAM
Payer: MEDICARE

## 2024-12-31 ENCOUNTER — APPOINTMENT (OUTPATIENT)
Dept: CT IMAGING | Facility: HOSPITAL | Age: 59
DRG: 394 | End: 2024-12-31
Attending: FAMILY MEDICINE
Payer: MEDICARE

## 2024-12-31 ENCOUNTER — HOSPITAL ENCOUNTER (INPATIENT)
Facility: HOSPITAL | Age: 59
LOS: 1 days | Discharge: HOME HEALTH | DRG: 394 | End: 2025-01-01
Attending: EMERGENCY MEDICINE | Admitting: STUDENT IN AN ORGANIZED HEALTH CARE EDUCATION/TRAINING PROGRAM
Payer: MEDICARE

## 2024-12-31 ENCOUNTER — APPOINTMENT (OUTPATIENT)
Dept: CARDIOLOGY | Facility: HOSPITAL | Age: 59
DRG: 394 | End: 2024-12-31
Attending: STUDENT IN AN ORGANIZED HEALTH CARE EDUCATION/TRAINING PROGRAM
Payer: MEDICARE

## 2024-12-31 DIAGNOSIS — R11.2 NAUSEA AND VOMITING, UNSPECIFIED VOMITING TYPE: ICD-10-CM

## 2024-12-31 DIAGNOSIS — F20.9 SCHIZOPHRENIA, UNSPECIFIED TYPE (H): Primary | ICD-10-CM

## 2024-12-31 DIAGNOSIS — I10 HYPERTENSION, UNSPECIFIED TYPE: ICD-10-CM

## 2024-12-31 DIAGNOSIS — K56.609 SMALL BOWEL OBSTRUCTION (H): ICD-10-CM

## 2024-12-31 DIAGNOSIS — R60.1 ANASARCA: ICD-10-CM

## 2024-12-31 LAB
ALBUMIN SERPL BCG-MCNC: 3.9 G/DL (ref 3.5–5.2)
ALP SERPL-CCNC: 105 U/L (ref 40–150)
ALT SERPL W P-5'-P-CCNC: 36 U/L (ref 0–50)
ANION GAP SERPL CALCULATED.3IONS-SCNC: 11 MMOL/L (ref 7–15)
AST SERPL W P-5'-P-CCNC: 32 U/L (ref 0–45)
BILIRUB DIRECT SERPL-MCNC: <0.2 MG/DL (ref 0–0.3)
BILIRUB SERPL-MCNC: 0.2 MG/DL
BUN SERPL-MCNC: 18.5 MG/DL (ref 6–20)
CALCIUM SERPL-MCNC: 10.3 MG/DL (ref 8.8–10.4)
CHLORIDE SERPL-SCNC: 86 MMOL/L (ref 98–107)
CREAT SERPL-MCNC: 0.51 MG/DL (ref 0.51–0.95)
EGFRCR SERPLBLD CKD-EPI 2021: >90 ML/MIN/1.73M2
GLUCOSE BLDC GLUCOMTR-MCNC: 203 MG/DL (ref 70–99)
GLUCOSE BLDC GLUCOMTR-MCNC: 209 MG/DL (ref 70–99)
GLUCOSE BLDC GLUCOMTR-MCNC: 219 MG/DL (ref 70–99)
GLUCOSE BLDC GLUCOMTR-MCNC: 226 MG/DL (ref 70–99)
GLUCOSE SERPL-MCNC: 221 MG/DL (ref 70–99)
HCO3 SERPL-SCNC: 30 MMOL/L (ref 22–29)
HOLD SPECIMEN: NORMAL
LVEF ECHO: NORMAL
MAGNESIUM SERPL-MCNC: 1.7 MG/DL (ref 1.7–2.3)
NT-PROBNP SERPL-MCNC: 124 PG/ML (ref 0–900)
OSMOLALITY SERPL: 279 MMOL/KG (ref 275–295)
OSMOLALITY UR: 876 MMOL/KG (ref 100–1200)
POTASSIUM SERPL-SCNC: 3.6 MMOL/L (ref 3.4–5.3)
PROT SERPL-MCNC: 7.2 G/DL (ref 6.4–8.3)
SODIUM SERPL-SCNC: 127 MMOL/L (ref 135–145)
SODIUM SERPL-SCNC: 129 MMOL/L (ref 135–145)
SODIUM UR-SCNC: <20 MMOL/L

## 2024-12-31 PROCEDURE — 250N000013 HC RX MED GY IP 250 OP 250 PS 637: Performed by: EMERGENCY MEDICINE

## 2024-12-31 PROCEDURE — 250N000012 HC RX MED GY IP 250 OP 636 PS 637: Performed by: STUDENT IN AN ORGANIZED HEALTH CARE EDUCATION/TRAINING PROGRAM

## 2024-12-31 PROCEDURE — 250N000013 HC RX MED GY IP 250 OP 250 PS 637: Performed by: STUDENT IN AN ORGANIZED HEALTH CARE EDUCATION/TRAINING PROGRAM

## 2024-12-31 PROCEDURE — 36415 COLL VENOUS BLD VENIPUNCTURE: CPT | Performed by: HOSPITALIST

## 2024-12-31 PROCEDURE — 250N000009 HC RX 250: Performed by: STUDENT IN AN ORGANIZED HEALTH CARE EDUCATION/TRAINING PROGRAM

## 2024-12-31 PROCEDURE — 84295 ASSAY OF SERUM SODIUM: CPT | Performed by: STUDENT IN AN ORGANIZED HEALTH CARE EDUCATION/TRAINING PROGRAM

## 2024-12-31 PROCEDURE — 74177 CT ABD & PELVIS W/CONTRAST: CPT | Mod: MA

## 2024-12-31 PROCEDURE — 99223 1ST HOSP IP/OBS HIGH 75: CPT | Mod: A1 | Performed by: STUDENT IN AN ORGANIZED HEALTH CARE EDUCATION/TRAINING PROGRAM

## 2024-12-31 PROCEDURE — 83930 ASSAY OF BLOOD OSMOLALITY: CPT | Performed by: STUDENT IN AN ORGANIZED HEALTH CARE EDUCATION/TRAINING PROGRAM

## 2024-12-31 PROCEDURE — C8929 TTE W OR WO FOL WCON,DOPPLER: HCPCS

## 2024-12-31 PROCEDURE — 250N000009 HC RX 250: Performed by: SURGERY

## 2024-12-31 PROCEDURE — 999N000157 HC STATISTIC RCP TIME EA 10 MIN

## 2024-12-31 PROCEDURE — 93306 TTE W/DOPPLER COMPLETE: CPT | Mod: 26 | Performed by: INTERNAL MEDICINE

## 2024-12-31 PROCEDURE — 255N000002 HC RX 255 OP 636: Performed by: SURGERY

## 2024-12-31 PROCEDURE — 80048 BASIC METABOLIC PNL TOTAL CA: CPT | Performed by: HOSPITALIST

## 2024-12-31 PROCEDURE — 36415 COLL VENOUS BLD VENIPUNCTURE: CPT | Performed by: STUDENT IN AN ORGANIZED HEALTH CARE EDUCATION/TRAINING PROGRAM

## 2024-12-31 PROCEDURE — 96375 TX/PRO/DX INJ NEW DRUG ADDON: CPT

## 2024-12-31 PROCEDURE — 120N000001 HC R&B MED SURG/OB

## 2024-12-31 PROCEDURE — G0378 HOSPITAL OBSERVATION PER HR: HCPCS

## 2024-12-31 PROCEDURE — 250N000011 HC RX IP 250 OP 636: Performed by: STUDENT IN AN ORGANIZED HEALTH CARE EDUCATION/TRAINING PROGRAM

## 2024-12-31 PROCEDURE — 999N000065 XR ABDOMEN PORT 1 VIEW

## 2024-12-31 PROCEDURE — 96361 HYDRATE IV INFUSION ADD-ON: CPT

## 2024-12-31 PROCEDURE — 258N000003 HC RX IP 258 OP 636: Performed by: STUDENT IN AN ORGANIZED HEALTH CARE EDUCATION/TRAINING PROGRAM

## 2024-12-31 PROCEDURE — 96376 TX/PRO/DX INJ SAME DRUG ADON: CPT

## 2024-12-31 PROCEDURE — 250N000011 HC RX IP 250 OP 636: Performed by: EMERGENCY MEDICINE

## 2024-12-31 PROCEDURE — 250N000011 HC RX IP 250 OP 636

## 2024-12-31 PROCEDURE — 82962 GLUCOSE BLOOD TEST: CPT

## 2024-12-31 PROCEDURE — 255N000002 HC RX 255 OP 636: Performed by: STUDENT IN AN ORGANIZED HEALTH CARE EDUCATION/TRAINING PROGRAM

## 2024-12-31 PROCEDURE — 99221 1ST HOSP IP/OBS SF/LOW 40: CPT | Mod: 24 | Performed by: SURGERY

## 2024-12-31 PROCEDURE — 250N000011 HC RX IP 250 OP 636: Performed by: FAMILY MEDICINE

## 2024-12-31 RX ORDER — TRAZODONE HYDROCHLORIDE 50 MG/1
100 TABLET ORAL AT BEDTIME
Status: DISCONTINUED | OUTPATIENT
Start: 2024-12-31 | End: 2025-01-01 | Stop reason: HOSPADM

## 2024-12-31 RX ORDER — SIMETHICONE 80 MG
80-160 TABLET,CHEWABLE ORAL EVERY 6 HOURS PRN
COMMUNITY
End: 2025-01-17

## 2024-12-31 RX ORDER — OLANZAPINE 5 MG/1
15 TABLET ORAL AT BEDTIME
Status: DISCONTINUED | OUTPATIENT
Start: 2024-12-31 | End: 2025-01-01 | Stop reason: HOSPADM

## 2024-12-31 RX ORDER — LIDOCAINE 40 MG/G
CREAM TOPICAL
Status: DISCONTINUED | OUTPATIENT
Start: 2024-12-31 | End: 2025-01-01 | Stop reason: HOSPADM

## 2024-12-31 RX ORDER — AMOXICILLIN 250 MG
1 CAPSULE ORAL 2 TIMES DAILY PRN
Status: DISCONTINUED | OUTPATIENT
Start: 2024-12-31 | End: 2025-01-01 | Stop reason: HOSPADM

## 2024-12-31 RX ORDER — CALCIUM CARBONATE 500 MG/1
1 TABLET, CHEWABLE ORAL 3 TIMES DAILY PRN
COMMUNITY
End: 2025-01-17

## 2024-12-31 RX ORDER — CLOZAPINE 25 MG/1
25 TABLET ORAL AT BEDTIME
Status: DISCONTINUED | OUTPATIENT
Start: 2024-12-31 | End: 2025-01-01 | Stop reason: HOSPADM

## 2024-12-31 RX ORDER — HYDRALAZINE HYDROCHLORIDE 10 MG/1
10 TABLET, FILM COATED ORAL EVERY 4 HOURS PRN
Status: DISCONTINUED | OUTPATIENT
Start: 2024-12-31 | End: 2025-01-01 | Stop reason: HOSPADM

## 2024-12-31 RX ORDER — ACETAMINOPHEN 650 MG/1
650 SUPPOSITORY RECTAL EVERY 4 HOURS PRN
Status: DISCONTINUED | OUTPATIENT
Start: 2024-12-31 | End: 2025-01-01 | Stop reason: HOSPADM

## 2024-12-31 RX ORDER — CALCIUM CARBONATE 500 MG/1
500 TABLET, CHEWABLE ORAL 3 TIMES DAILY PRN
Status: DISCONTINUED | OUTPATIENT
Start: 2024-12-31 | End: 2025-01-01 | Stop reason: HOSPADM

## 2024-12-31 RX ORDER — ONDANSETRON 2 MG/ML
4 INJECTION INTRAMUSCULAR; INTRAVENOUS EVERY 6 HOURS PRN
Status: DISCONTINUED | OUTPATIENT
Start: 2024-12-31 | End: 2025-01-01 | Stop reason: HOSPADM

## 2024-12-31 RX ORDER — AMOXICILLIN 250 MG
2 CAPSULE ORAL 2 TIMES DAILY PRN
Status: DISCONTINUED | OUTPATIENT
Start: 2024-12-31 | End: 2025-01-01 | Stop reason: HOSPADM

## 2024-12-31 RX ORDER — IOPAMIDOL 755 MG/ML
100 INJECTION, SOLUTION INTRAVASCULAR ONCE
Status: COMPLETED | OUTPATIENT
Start: 2024-12-31 | End: 2024-12-31

## 2024-12-31 RX ORDER — OXYCODONE HYDROCHLORIDE 5 MG/1
5 TABLET ORAL ONCE
Status: COMPLETED | OUTPATIENT
Start: 2024-12-31 | End: 2025-01-01

## 2024-12-31 RX ORDER — ATORVASTATIN CALCIUM 10 MG/1
10 TABLET, FILM COATED ORAL AT BEDTIME
Status: DISCONTINUED | OUTPATIENT
Start: 2024-12-31 | End: 2025-01-01 | Stop reason: HOSPADM

## 2024-12-31 RX ORDER — NICOTINE POLACRILEX 4 MG
15-30 LOZENGE BUCCAL
Status: DISCONTINUED | OUTPATIENT
Start: 2024-12-31 | End: 2025-01-01 | Stop reason: HOSPADM

## 2024-12-31 RX ORDER — VENLAFAXINE HYDROCHLORIDE 150 MG/1
150 CAPSULE, EXTENDED RELEASE ORAL AT BEDTIME
Status: DISCONTINUED | OUTPATIENT
Start: 2024-12-31 | End: 2025-01-01 | Stop reason: HOSPADM

## 2024-12-31 RX ORDER — ASPIRIN 81 MG/1
81 TABLET ORAL AT BEDTIME
Status: DISCONTINUED | OUTPATIENT
Start: 2024-12-31 | End: 2025-01-01 | Stop reason: HOSPADM

## 2024-12-31 RX ORDER — DEXTROSE MONOHYDRATE 25 G/50ML
25-50 INJECTION, SOLUTION INTRAVENOUS
Status: DISCONTINUED | OUTPATIENT
Start: 2024-12-31 | End: 2025-01-01 | Stop reason: HOSPADM

## 2024-12-31 RX ORDER — CHOLECALCIFEROL (VITAMIN D3) 1250 MCG
1250 CAPSULE ORAL WEEKLY
Status: DISCONTINUED | OUTPATIENT
Start: 2025-01-03 | End: 2025-01-01 | Stop reason: HOSPADM

## 2024-12-31 RX ORDER — GABAPENTIN 300 MG/1
600 CAPSULE ORAL 3 TIMES DAILY
Status: DISCONTINUED | OUTPATIENT
Start: 2024-12-31 | End: 2025-01-01 | Stop reason: HOSPADM

## 2024-12-31 RX ORDER — LIDOCAINE HYDROCHLORIDE 20 MG/ML
10 JELLY TOPICAL ONCE
Status: COMPLETED | OUTPATIENT
Start: 2024-12-31 | End: 2024-12-31

## 2024-12-31 RX ORDER — CALCIUM CARBONATE 500 MG/1
1000 TABLET, CHEWABLE ORAL 4 TIMES DAILY PRN
Status: DISCONTINUED | OUTPATIENT
Start: 2024-12-31 | End: 2025-01-01 | Stop reason: HOSPADM

## 2024-12-31 RX ORDER — HYDRALAZINE HYDROCHLORIDE 20 MG/ML
10 INJECTION INTRAMUSCULAR; INTRAVENOUS EVERY 4 HOURS PRN
Status: DISCONTINUED | OUTPATIENT
Start: 2024-12-31 | End: 2025-01-01 | Stop reason: HOSPADM

## 2024-12-31 RX ORDER — ONDANSETRON 2 MG/ML
4 INJECTION INTRAMUSCULAR; INTRAVENOUS ONCE
Status: COMPLETED | OUTPATIENT
Start: 2024-12-31 | End: 2024-12-31

## 2024-12-31 RX ORDER — FUROSEMIDE 20 MG/1
20 TABLET ORAL
Status: DISCONTINUED | OUTPATIENT
Start: 2024-12-31 | End: 2025-01-01 | Stop reason: HOSPADM

## 2024-12-31 RX ORDER — FERROUS SULFATE 325(65) MG
325 TABLET ORAL AT BEDTIME
Status: DISCONTINUED | OUTPATIENT
Start: 2024-12-31 | End: 2025-01-01 | Stop reason: HOSPADM

## 2024-12-31 RX ORDER — SODIUM CHLORIDE 9 MG/ML
INJECTION, SOLUTION INTRAVENOUS CONTINUOUS
Status: DISCONTINUED | OUTPATIENT
Start: 2024-12-31 | End: 2024-12-31

## 2024-12-31 RX ORDER — ENALAPRIL MALEATE 2.5 MG/1
2.5 TABLET ORAL DAILY
Status: DISCONTINUED | OUTPATIENT
Start: 2024-12-31 | End: 2024-12-31

## 2024-12-31 RX ORDER — ENALAPRIL MALEATE 2.5 MG/1
2.5 TABLET ORAL ONCE
Status: COMPLETED | OUTPATIENT
Start: 2024-12-31 | End: 2024-12-31

## 2024-12-31 RX ORDER — ENALAPRIL MALEATE 2.5 MG/1
2.5 TABLET ORAL EVERY MORNING
Status: DISCONTINUED | OUTPATIENT
Start: 2025-01-01 | End: 2025-01-01 | Stop reason: HOSPADM

## 2024-12-31 RX ORDER — SODIUM CHLORIDE 9 MG/ML
INJECTION, SOLUTION INTRAVENOUS CONTINUOUS
Status: ACTIVE | OUTPATIENT
Start: 2024-12-31 | End: 2025-01-01

## 2024-12-31 RX ORDER — ACETAMINOPHEN 325 MG/1
650 TABLET ORAL EVERY 4 HOURS PRN
Status: DISCONTINUED | OUTPATIENT
Start: 2024-12-31 | End: 2025-01-01 | Stop reason: HOSPADM

## 2024-12-31 RX ORDER — ONDANSETRON 4 MG/1
4 TABLET, ORALLY DISINTEGRATING ORAL EVERY 6 HOURS PRN
Status: DISCONTINUED | OUTPATIENT
Start: 2024-12-31 | End: 2025-01-01 | Stop reason: HOSPADM

## 2024-12-31 RX ADMIN — DIATRIZOATE MEGLUMINE AND DIATRIZOATE SODIUM 150 ML: 660; 100 LIQUID ORAL; RECTAL at 17:05

## 2024-12-31 RX ADMIN — PROCHLORPERAZINE EDISYLATE 10 MG: 5 INJECTION INTRAMUSCULAR; INTRAVENOUS at 04:57

## 2024-12-31 RX ADMIN — ENALAPRIL MALEATE 2.5 MG: 2.5 TABLET ORAL at 05:58

## 2024-12-31 RX ADMIN — PERFLUTREN 3 ML: 6.52 INJECTION, SUSPENSION INTRAVENOUS at 14:30

## 2024-12-31 RX ADMIN — ACETAMINOPHEN 650 MG: 325 TABLET ORAL at 21:53

## 2024-12-31 RX ADMIN — IOPAMIDOL 100 ML: 755 INJECTION, SOLUTION INTRAVENOUS at 00:39

## 2024-12-31 RX ADMIN — LIDOCAINE HYDROCHLORIDE 10 ML: 20 JELLY TOPICAL at 09:50

## 2024-12-31 RX ADMIN — SODIUM CHLORIDE: 9 INJECTION, SOLUTION INTRAVENOUS at 20:16

## 2024-12-31 RX ADMIN — INSULIN ASPART 2 UNITS: 100 INJECTION, SOLUTION INTRAVENOUS; SUBCUTANEOUS at 22:33

## 2024-12-31 RX ADMIN — INSULIN GLARGINE 20 UNITS: 100 INJECTION, SOLUTION SUBCUTANEOUS at 14:47

## 2024-12-31 RX ADMIN — ONDANSETRON 4 MG: 2 INJECTION INTRAMUSCULAR; INTRAVENOUS at 21:53

## 2024-12-31 RX ADMIN — ONDANSETRON 4 MG: 2 INJECTION INTRAMUSCULAR; INTRAVENOUS at 17:03

## 2024-12-31 RX ADMIN — INSULIN ASPART 2 UNITS: 100 INJECTION, SOLUTION INTRAVENOUS; SUBCUTANEOUS at 18:41

## 2024-12-31 RX ADMIN — SODIUM CHLORIDE: 9 INJECTION, SOLUTION INTRAVENOUS at 11:21

## 2024-12-31 RX ADMIN — ONDANSETRON 4 MG: 2 INJECTION INTRAMUSCULAR; INTRAVENOUS at 10:36

## 2024-12-31 RX ADMIN — TOPICAL ANESTHETIC 2.5 ML: 200 SPRAY DENTAL; PERIODONTAL at 09:50

## 2024-12-31 ASSESSMENT — ACTIVITIES OF DAILY LIVING (ADL)
ADLS_ACUITY_SCORE: 49

## 2024-12-31 NOTE — ED NOTES
"[facility] ED Handoff Report    ED Chief Complaint: Abdominal pain    ED Diagnosis:  (K56.609) Small bowel obstruction (H)      (R11.2) Nausea and vomiting, unspecified vomiting type      (I10) Hypertension, unspecified type         PMH:    Past Medical History:   Diagnosis Date    Atopic eczema     Bipolar 1 disorder (H)     COVID-19 12/22/2020    Diabetes mellitus, type II (H)     GERD (gastroesophageal reflux disease)     Hyperlipidemia     Hypertension     Liver disease     Obesity     Schizophrenia (H)     Sleep apnea         Code Status:  Full Code     Falls Risk: No Band: Not applicable    Current Living Situation/Residence: lives with a significant other     Elimination Status: Continent: Yes     Activity Level: Independent    Patients Preferred Language:  English     Needed: No    Vital Signs:  BP (!) 168/79   Pulse 96   Temp 97  F (36.1  C) (Temporal)   Resp 20   Ht 1.651 m (5' 5\")   Wt 141.1 kg (311 lb)   SpO2 94%   BMI 51.75 kg/m       Pain Score: 5/10    Is the Patient Confused:  No    Last Food or Drink: 12/30/24     Focused Assessment:  Abdomen distended, pt nauseated. NG placed, some relief. 200 ml in suction container. LIS.     Tests Performed: Done: Labs and Imaging    Treatments Provided:  NG    Family Dynamics/Concerns: No    Family Updated On Visitor Policy: N/A    Plan of Care Communicated to Family: N/A    Who Was Updated about Plan of Care: pt    Belongings Checklist Done and Signed by Patient: Yes    Medications sent with patient:       Additional Information:     RN: Alyssia Szymanski RN   12/31/2024 12:05 PM      "

## 2024-12-31 NOTE — ED NOTES
No beds in  or [facility]. [facility] has bed at [facility]. Pt agrees to this. Provider notified

## 2024-12-31 NOTE — CONSULTS
HPI  59 year old year old female who I have been consulted by No ref. provider found for evaluation of Abdominal Pain  59-year-old female who recently underwent a laparoscopic appendectomy at an outside hospital who subsequently developed abdominal bloating with nausea and vomiting.  Pain has been relatively persistent.  No fever but did have an episode of chills recently.  No other complaints.  She tried to go back to her old hospital where she had the surgery however, they apparently had no beds and she was subsequently sent to [facility].      Allergies:Other environmental allergy and Penicillins    Past Medical History:   Diagnosis Date    Atopic eczema     Bipolar 1 disorder (H)     COVID-19 12/22/2020    Diabetes mellitus, type II (H)     GERD (gastroesophageal reflux disease)     Hyperlipidemia     Hypertension     Liver disease     Obesity     Schizophrenia (H)     Sleep apnea        Past Surgical History:   Procedure Laterality Date    ANKLE FRACTURE SURGERY      LAPAROSCOPIC APPENDECTOMY N/A 12/22/2024    Procedure: APPENDECTOMY, LAPAROSCOPIC;  Surgeon: Juarez Rocha MD;  Location:  OR         CURRENT MEDS:    Current Facility-Administered Medications:     acetaminophen (TYLENOL) tablet 650 mg, 650 mg, Oral, Q4H PRN **OR** acetaminophen (TYLENOL) Suppository 650 mg, 650 mg, Rectal, Q4H PRN, Kenya Perez MD    [Held by provider] aspirin EC tablet 81 mg, 81 mg, Oral, At Bedtime, Kenya Perez MD    [Held by provider] atorvastatin (LIPITOR) tablet 10 mg, 10 mg, Oral, At Bedtime, Kenya Perez MD    calcium carbonate (TUMS) chewable tablet 1,000 mg, 1,000 mg, Oral, 4x Daily PRN, Kenya Perez MD    [Held by provider] calcium carbonate (TUMS) chewable tablet 500 mg, 500 mg, Oral, TID PRN, Kenya Perez MD    [Held by provider] cholecalciferol (VITAMIN D3) capsule 1,250 mcg, 1,250 mcg, Oral, Weekly, Kenya Perez MD    [Held by provider] cloZAPine (CLOZARIL) tablet 25 mg, 25  mg, Oral, At Bedtime, Kenya Perez MD    glucose gel 15-30 g, 15-30 g, Oral, Q15 Min PRN **OR** dextrose 50 % injection 25-50 mL, 25-50 mL, Intravenous, Q15 Min PRN **OR** glucagon injection 1 mg, 1 mg, Subcutaneous, Q15 Min PRN, Kenya Perez MD    diatrizoate meglumine-sodium (GASTROGRAFIN) 120 mL in sterile water (bottle) 150 mL, 150 mL, Nasogastric, Once, Sundar Eng DO    [Held by provider] enalapril (VASOTEC) tablet 2.5 mg, 2.5 mg, Oral, QAM, Kenya Preez MD    [Held by provider] ferrous sulfate (FEROSUL) tablet 325 mg, 325 mg, Oral, At Bedtime, Kenya Perez MD    [Held by provider] furosemide (LASIX) tablet 20 mg, 20 mg, Oral, BID, Kenya Perez MD    [Held by provider] gabapentin (NEURONTIN) capsule 600 mg, 600 mg, Oral, TID, Kenya Perez MD    hydrALAZINE (APRESOLINE) tablet 10 mg, 10 mg, Oral, Q4H PRN **OR** hydrALAZINE (APRESOLINE) injection 10 mg, 10 mg, Intravenous, Q4H PRN, Kenya Perez MD    insulin aspart (NovoLOG) injection (RAPID ACTING), 1-7 Units, Subcutaneous, Q4H, Kenya Perez MD    insulin glargine (LANTUS PEN) injection 20 Units, 20 Units, Subcutaneous, QAM AC, Kenya Perez MD    lidocaine (LMX4) cream, , Topical, Q1H PRN, Kenya Perez MD    lidocaine 1 % 0.1-1 mL, 0.1-1 mL, Other, Q1H PRN, Kenya Perez MD    [Held by provider] OLANZapine (zyPREXA) tablet 15 mg, 15 mg, Oral, At Bedtime, Kenya Perez MD    ondansetron (ZOFRAN ODT) ODT tab 4 mg, 4 mg, Oral, Q6H PRN **OR** ondansetron (ZOFRAN) injection 4 mg, 4 mg, Intravenous, Q6H PRN, Kenya Perez MD, 4 mg at 12/31/24 1036    ondansetron (ZOFRAN) injection 4 mg, 4 mg, Intravenous, Q6H PRN, Kenya Perez MD    senna-docusate (SENOKOT-S/PERICOLACE) 8.6-50 MG per tablet 1 tablet, 1 tablet, Oral, BID PRN **OR** senna-docusate (SENOKOT-S/PERICOLACE) 8.6-50 MG per tablet 2 tablet, 2 tablet, Oral, BID PRN, Kenya Perez MD    sodium chloride  "(PF) 0.9% PF flush 3 mL, 3 mL, Intracatheter, Q8H, Kenya Perez MD    sodium chloride (PF) 0.9% PF flush 3 mL, 3 mL, Intracatheter, q1 min prn, Kenya Perez MD    sodium chloride 0.9 % infusion, , Intravenous, Continuous, Kenya Perez MD, Last Rate: 75 mL/hr at 12/31/24 1121, New Bag at 12/31/24 1121    [Held by provider] traZODone (DESYREL) tablet 100 mg, 100 mg, Oral, At Bedtime, Kenya Perez MD    [Held by provider] venlafaxine (EFFEXOR XR) 24 hr capsule 150 mg, 150 mg, Oral, At Bedtime, Kenya Perez MD    FAMHX-reviewed     reports that she has quit smoking. She does not have any smokeless tobacco history on file.    Review of Systems:  The 12 point review of systems  is within normal limits except for as mentioned above in the HPI.  General ROS: No complaints or constitutional symptoms  Ophthalmic ROS: No complaints of visual changes  Skin: No complaints or symptoms   Endocrine: No complaints or symptoms  Hematologic/Lymphatic: No symptoms or complaints  Psychiatric: No symptoms or complaints  Respiratory ROS: no cough, shortness of breath, or wheezing  Cardiovascular ROS: no chest pain or dyspnea on exertion  Gastrointestinal ROS: As per HPI  Genito-Urinary ROS: no dysuria, trouble voiding, or hematuria  Musculoskeletal ROS: no joint or muscle pain  Neurological ROS: no TIA or stroke symptoms      EXAM:  BP (!) 172/76 (BP Location: Right arm)   Pulse 102   Temp 97.6  F (36.4  C) (Oral)   Resp 18   Ht 1.651 m (5' 5\")   Wt 140.2 kg (309 lb 1.4 oz)   SpO2 92%   BMI 51.43 kg/m    GENERAL: Well developed female, No acute distress, pleasant and conversant   EYES: Pupils equal, round and reactive, no scleral icterus  ABDOMEN: Distended, mild tenderness to palpation left lower quadrant appropriately  SKIN: Pink, warm and dry, no obvious rashes or lesions   NEURO:No focal deficits, ambulatory  MUSCULOSKELETAL:No obvious deformities, no swelling, normal appearing      LABS:  Lab " Results   Component Value Date    WBC 13.1 12/30/2024    HGB 13.7 12/30/2024    HCT 41.1 12/30/2024    MCV 82 12/30/2024     12/30/2024     INR/Prothrombin Time  Recent Labs   Lab 12/31/24  0755   *   CO2 30*   BUN 18.5     Lab Results   Component Value Date    ALT 36 12/30/2024    AST 32 12/30/2024    ALKPHOS 105 12/30/2024       IMAGES:   Relevant images were reviewed and discussed with the patient.  Notable findings were: CT scan images reviewed    Assessment/Plan:   Monica Miguel is a 59 year old female with likely postoperative ileus after an appendectomy.  At this point there is no role for any surgery I do not see any abscesses.  She has an NG tube in place which has seemed to help.  We will perform a Gastrografin challenge with some contrast to see if this open things up for her.      Suhas Eng D.O. FACS  [phone]

## 2024-12-31 NOTE — MEDICATION SCRIBE - ADMISSION MEDICATION HISTORY
Medication Scribe Admission Medication History    Admission medication history is complete. The information provided in this note is only as accurate as the sources available at the time of the update.    Information Source(s): Patient and CareEverywhere/SureScripts via in-person    Pertinent Information: Patient manages her own medications at home. Patient reports being sick for past few days. Last time she successfully took medications was Sun AM 12/29, but she also attempted the PM/HS meds for Sun but threw up afterwards. Patient did not try taking prescription medications on Mon 12/30. Pt only reports Tylenol, Gas-X and Tums used throughout day on Monday 12/30/24.   Ozempic  -pt reports 1mg weekly, on Fridays. Missed last weeks dose. Most recently taken 12/20 Friday.      Changes made to PTA medication list:  Added:   Tums  Gas-X  Deleted:   Senna-S (was given with Oxycodone, but not taking either anymore)  Changed: None    Allergies reviewed with patient and updates made in EHR: yes    Medication History Completed By: Audie Steinberg 12/31/2024 5:08 AM    PTA Med List   Medication Sig Last Dose/Taking    acetaminophen (TYLENOL) 500 MG tablet Take 500-1,000 mg by mouth every 6 hours as needed for mild pain 12/30/2024 Noon    aspirin 81 MG EC tablet Take 81 mg by mouth at bedtime. 12/29/2024 Bedtime    atorvastatin (LIPITOR) 10 MG tablet Take 10 mg by mouth at bedtime. 12/29/2024 Bedtime    Blood Glucose Monitoring Suppl (BLOOD GLUCOSE MONITOR SYSTEM) w/Device KIT  Taking    calcium carbonate (TUMS) 500 MG chewable tablet Take 1 chew tab by mouth 3 times daily as needed for heartburn. 12/30/2024 Noon    cholecalciferol (VITAMIN D3) 1250 mcg (25086 units) capsule Take 50,000 Units by mouth once a week. On Fridays 12/27/2024 Morning    cloZAPine (CLOZARIL) 25 MG tablet Take 25 mg by mouth At Bedtime 12/29/2024 Bedtime    enalapril (VASOTEC) 2.5 MG tablet Take 2.5 mg by mouth every morning. 12/29/2024 Morning     ferrous sulfate (FEROSUL) 325 (65 Fe) MG tablet Take 1 tablet by mouth at bedtime. 12/29/2024 Bedtime    furosemide (LASIX) 20 MG tablet Take 20 mg by mouth 2 times daily. 12/29/2024 Evening    gabapentin (NEURONTIN) 600 MG tablet Take 600 mg by mouth 3 times daily  12/29/2024 Evening    insulin glargine 100 UNIT/ML pen Inject 46 Units subcutaneously at bedtime. 12/29/2024 Bedtime    OLANZapine (ZYPREXA) 15 MG tablet Take 15 mg by mouth At Bedtime 12/29/2024 Bedtime    pioglitazone (ACTOS) 45 MG tablet Take 45 mg by mouth every morning. 12/29/2024 Morning    Semaglutide, 1 MG/DOSE, (OZEMPIC) 4 MG/3ML pen Inject 1 mg subcutaneously every 7 days. On Fridays 12/20/2024 Morning    simethicone (MYLICON) 80 MG chewable tablet Take  mg by mouth every 6 hours as needed for flatulence or cramping. 12/30/2024 Noon    traZODone (DESYREL) 100 MG tablet Take 100 mg by mouth at bedtime. 12/29/2024 Bedtime    venlafaxine (EFFEXOR-XR) 150 MG 24 hr capsule Take 150 mg by mouth at bedtime. 12/29/2024 Bedtime

## 2024-12-31 NOTE — SUMMARY OF CARE
.Patient admitted to room 05 at approximately 115pm via wheel chair from emergency room.    Patient ambulated/transferred:  with one assist. self.    Detailed List of Belongings (be very specific listing out each item):   Black jacket   Gray sweater   Shoes   Pants  Underwear   Bra   Backpack  Phone, phone   Wallet with 75$ 2 credit card

## 2024-12-31 NOTE — H&P
[facility]    History and Physical - Hospitalist Service       Date of Admission:  12/31/2024    Assessment & Plan      Monica Miguel is a 59 year old female admitted on 12/31/2024. She presented with abdominal pain. Admitted for possible SBO    Possible SBO  Recent appendectomy   -- CT-a/p: Significantly distended stomach and multiple dilated small bowel loops, with a gradual transition into nondilated gas-filled small bowel loops in the right lower quadrant, findings could represent ileus versus early small bowel obstruction.   -- NPO  -- NGT to LIS  -- Serial abdominal exam  -- Surgery consulted in ED    Leukocytosis  -- Afebrile, likely reactive  -- Monitor cbc and temp curve    Hyponatremia  Hypochloremia  -- Likely 2/2 decreased intake, GI loss, Lasix  -- IVF. Monitor BMP. Check urine sodium and osm  -- Intake/output monitoring    Anasarca  -- Probnp: 124. Will hold actos for now as it is known to cause edema  -- TTE pending  -- Lasix held due to GI loss and hyponatremia    DM II  Diabetic neuropathy  -- A1c 6.6% on 12/2/24  -- PTA Actos- held in light of anasarca. PTA semaglutide 1 mg weekly  -- PTA Insulin glargine 46 units every day. Will reduce to 20 units while NPO  -- Accu-checks, Medium sliding scale, hypoglycemia protocol  -- Gabapentin when able to take po    Essential hypertension  -- PTA Enalapril when able to take po  -- Monitor vital signs per protocol  -- IV hydralazin prn    Hyperlipidemia  -- PTA Lipitor when able to take po    Schizophrenia  Insomina  -- C/w PTA Clozapine, zyprexa, trazodone, venlafaxine when able to take po            Diet: NPO for Medical/Clinical Reasons Except for: No Exceptions    DVT Prophylaxis: Pneumatic Compression Devices  Yi Catheter: Not present  Lines: None     Cardiac Monitoring: None  Code Status: Full Code    Clinically Significant Risk Factors Present on Admission         # Hyponatremia: Lowest Na = 127 mmol/L in last 2 days, will  "monitor as appropriate  # Hypochloremia: Lowest Cl = 84 mmol/L in last 2 days, will monitor as appropriate   # Hypercalcemia: Highest Ca = 11.1 mg/dL in last 2 days, will monitor as appropriate      # Drug Induced Platelet Defect: home medication list includes an antiplatelet medication   # Hypertension: Noted on problem list           # Severe Obesity: Estimated body mass index is 51.75 kg/m  as calculated from the following:    Height as of this encounter: 1.651 m (5' 5\").    Weight as of this encounter: 141.1 kg (311 lb).              Disposition Plan     Medically Ready for Discharge: Anticipated in 2-4 Days           Kenya Perez MD  Hospitalist Service  [facility]  Securely message with [employer] (more info)  Text page via [employer] Paging/Directory     ______________________________________________________________________    Chief Complaint   Abdominal pain    History is obtained from the patient    History of Present Illness   Monica Miguel is a 59 year old female who presented with abdominal pain an distention of four days. Pt also has nausea, vomiting, and intermittent watery diarrhea. Last sonam bowel movement was a week before presentation. She had appendectomy on 12/22/24 at [facility]. Denied fever. In the ED, she has leukocytosis, mild hyponatremia, and hyperglycemia. CT-a/p suspicious for ileus vs early evolving small bowel obstruction. Surgery was consulted in ED. Pt is admitted for possible SBO.      Past Medical History    Past Medical History:   Diagnosis Date    Atopic eczema     Bipolar 1 disorder (H)     COVID-19 12/22/2020    Diabetes mellitus, type II (H)     GERD (gastroesophageal reflux disease)     Hyperlipidemia     Hypertension     Liver disease     Obesity     Schizophrenia (H)     Sleep apnea        Past Surgical History   Past Surgical History:   Procedure Laterality Date    ANKLE FRACTURE SURGERY      LAPAROSCOPIC APPENDECTOMY N/A 12/22/2024    " Procedure: APPENDECTOMY, LAPAROSCOPIC;  Surgeon: Juarez Rocha MD;  Location:  OR       Prior to Admission Medications   Prior to Admission Medications   Prescriptions Last Dose Informant Patient Reported? Taking?   Blood Glucose Monitoring Suppl (BLOOD GLUCOSE MONITOR SYSTEM) w/Device KIT  Self Yes Yes   OLANZapine (ZYPREXA) 15 MG tablet 12/29/2024 Bedtime Self Yes Yes   Sig: Take 15 mg by mouth At Bedtime   Semaglutide, 1 MG/DOSE, (OZEMPIC) 4 MG/3ML pen 12/20/2024 Morning Self Yes Yes   Sig: Inject 1 mg subcutaneously every 7 days. On Fridays   acetaminophen (TYLENOL) 500 MG tablet 12/30/2024 Noon Self Yes Yes   Sig: Take 500-1,000 mg by mouth every 6 hours as needed for mild pain   aspirin 81 MG EC tablet 12/29/2024 Bedtime Self Yes Yes   Sig: Take 81 mg by mouth at bedtime.   atorvastatin (LIPITOR) 10 MG tablet 12/29/2024 Bedtime Self Yes Yes   Sig: Take 10 mg by mouth at bedtime.   calcium carbonate (TUMS) 500 MG chewable tablet 12/30/2024 Noon  Yes Yes   Sig: Take 1 chew tab by mouth 3 times daily as needed for heartburn.   cholecalciferol (VITAMIN D3) 1250 mcg (75677 units) capsule 12/27/2024 Morning Self Yes Yes   Sig: Take 50,000 Units by mouth once a week. On Fridays   cloZAPine (CLOZARIL) 25 MG tablet 12/29/2024 Bedtime Self Yes Yes   Sig: Take 25 mg by mouth At Bedtime   enalapril (VASOTEC) 2.5 MG tablet 12/29/2024 Morning Self Yes Yes   Sig: Take 2.5 mg by mouth every morning.   ferrous sulfate (FEROSUL) 325 (65 Fe) MG tablet 12/29/2024 Bedtime Self Yes Yes   Sig: Take 1 tablet by mouth at bedtime.   furosemide (LASIX) 20 MG tablet 12/29/2024 Evening Self Yes Yes   Sig: Take 20 mg by mouth 2 times daily.   gabapentin (NEURONTIN) 600 MG tablet 12/29/2024 Evening Self Yes Yes   Sig: Take 600 mg by mouth 3 times daily    insulin glargine 100 UNIT/ML pen 12/29/2024 Bedtime Self Yes Yes   Sig: Inject 46 Units subcutaneously at bedtime.   pioglitazone (ACTOS) 45 MG tablet 12/29/2024 Morning Self Yes Yes    Sig: Take 45 mg by mouth every morning.   simethicone (MYLICON) 80 MG chewable tablet 12/30/2024 Noon  Yes Yes   Sig: Take  mg by mouth every 6 hours as needed for flatulence or cramping.   traZODone (DESYREL) 100 MG tablet 12/29/2024 Bedtime Self Yes Yes   Sig: Take 100 mg by mouth at bedtime.   venlafaxine (EFFEXOR-XR) 150 MG 24 hr capsule 12/29/2024 Bedtime Self Yes Yes   Sig: Take 150 mg by mouth at bedtime.      Facility-Administered Medications: None        Review of Systems    The 10 point Review of Systems is negative other than noted in the HPI or here.      Physical Exam   Vital Signs: Temp: 97  F (36.1  C) Temp src: Temporal BP: (!) 176/80 Pulse: 93   Resp: 20 SpO2: 94 % O2 Device: None (Room air)    Weight: 311 lbs 0 oz    GEN: Alert and oriented. Not in acute distress.  HEENT: Atraumatic, mucous membrane- moist and pink.  Chest: Bilateral air entry.  CVS: S1S2 regular.   Abdomen: distended. Non tender. No organomegaly. No guarding or rigidity. Bowel sounds active.   Extremities: + b/l LE edema.  CNS: No involuntary movements.  Skin: no cyanosis or clubbing.     Medical Decision Making       76 MINUTES SPENT BY ME on the date of service doing chart review, history, exam, documentation & further activities per the note.      Data

## 2024-12-31 NOTE — ED TRIAGE NOTES
"Pt having lower mid abdominal pain with nausea x 1 day.  Pt had appendectomy on Friday and \"fell\" into recliner last night and has had pain since. Unsure if related.  Pt having loose stools.     Triage Assessment (Adult)       Row Name 12/30/24 8731          Triage Assessment    Airway WDL WDL        Respiratory WDL    Respiratory WDL WDL                     "

## 2024-12-31 NOTE — PLAN OF CARE
"  Problem: Adult Inpatient Plan of Care  Goal: Patient-Specific Goal (Individualized)  Description: You can add care plan individualizations to a care plan. Examples of Individualization might be:  \"Parent requests to be called daily at 9am for status\", \"I have a hard time hearing out of my right ear\", or \"Do not touch me to wake me up as it startles  me\".  Outcome: Progressing     Problem: Adult Inpatient Plan of Care  Goal: Optimal Comfort and Wellbeing  Outcome: Progressing   Goal Outcome Evaluation:      Plan of Care Reviewed With: patient        Blood pressure elevated in the 170's, can have hydralazine for blood pressure greater than 180. Patient had three episodes of vomiting after 3:00 PM, two small and one large. Patient denies pain at this time, up with standby assistance. NG to suction, 600 out this shift. Continue to monitor         "

## 2024-12-31 NOTE — ED PROVIDER NOTES
EMERGENCY DEPARTMENT ENCOUNTER      NAME: Monica Miguel  AGE: 59 year old female  YOB: 1965  MRN: [id number]  EVALUATION DATE & TIME: No admission date for patient encounter.    PCP: Shiloh Reyes    ED PROVIDER: Roel Hoffman DO      Chief Complaint   Patient presents with    Abdominal Pain         FINAL IMPRESSION:  1. Small bowel obstruction (H)    2. Nausea and vomiting, unspecified vomiting type    3. Hypertension, unspecified type          ED COURSE & MEDICAL DECISION MAKING:    Female who recently underwent an appendectomy presented to the ED for evaluation of abdominal pain that occurred after she fell back into a chair last evening.  Prior to this the patient had been reporting fevers, chills, as well as some nausea.  Upon arrival to the ED the patient was noted to be hypertensive and tachycardic.  She was afebrile and otherwise hemodynamically stable.  The patient was seen in the waiting secondary to significant patient volumes.  Patient was slightly uncomfortable appearing.  However, she did not appear to be in acute distress and she was not acutely ill-appearing.  On exam the patient was noted to have mild periumbilical/suprapubic abdominal tenderness to palpation.  She did not have evidence of an acute abdomen.  The remainder of her physical exam was unremarkable.      Following initial evaluation the patient was given IV Zofran, oxycodone, and a liter of normal saline.    The patient's white blood cell count was slightly elevated at 13.1.  She was also noted to have mild hyponatremia with a sodium of 129.  CBC and CMP labs are otherwise reassuring.  Testing for COVID and influenza are both negative.  UA shows glucose, hematuria, and ketones.  However, there is no signs of urinary tract infection.    CT scan of the abdomen shows a significantly distended stomach with multiple dilated loops of small bowel.  Findings are concerning for an ileus versus early small bowel obstruction.  Patient  was also noted to have anasarca.    Patient was reevaluated and informed of the lab and imaging results.  Patient reports persistent abdominal discomfort and distention as well as nausea.  Because of the concern for an early small bowel obstruction the patient was admitted for further observation after discussing the case with the hospitalist.  Because she was not having any active vomiting an NG tube was not placed.      The patient was given a dose of her home allopurinol to treat her elevated blood pressures.  He was also given a dose of additional Compazine to treat persistent nausea.       Pertinent Labs & Imaging studies reviewed. (See chart for details)  9:59 PM I met with the patient to gather history and to perform my initial exam. We discussed plans for the ED course, including diagnostic testing and treatment.     At the conclusion of the encounter I discussed the results of all of the tests and the disposition. The questions were answered. The patient or family acknowledged understanding and was agreeable with the care plan.     Medical Decision Making    History:  Supplemental history from: N/A  External Record(s) reviewed: Documented in chart    Work Up:  Chart documentation includes differential considered and any EKGs or imaging independently interpreted by provider, where specified.  In additional to work up documented, I considered the following work up: Documented in chart, if applicable.    External consultation:  Discussion of management with another provider: Documented in chart, if applicable    Complicating factors:  Care impacted by chronic illness: Diabetes, Hyperlipidemia, and Hypertension  Care affected by social determinants of health: N/A    Disposition considerations: Admit.    Not Applicable      PPE worn: n95 mask, goggles    MEDICATIONS GIVEN IN THE EMERGENCY:  Medications   enalapril (VASOTEC) tablet 2.5 mg (has no administration in time range)   prochlorperazine (COMPAZINE)  injection 10 mg (has no administration in time range)   sodium chloride 0.9% BOLUS 1,000 mL (0 mLs Intravenous Stopped 12/31/24 0218)   ondansetron (ZOFRAN) injection 4 mg (4 mg Intravenous $Given 12/30/24 2217)   oxyCODONE (ROXICODONE) tablet 5 mg (5 mg Oral $Given 12/30/24 2214)   iopamidol (ISOVUE-370) solution 100 mL (100 mLs Intravenous $Given 12/31/24 0039)       NEW PRESCRIPTIONS STARTED AT TODAY'S ER VISIT  New Prescriptions    No medications on file          =================================================================    HPI    Patient information was obtained from: the patient    Use of : N/A         Monica Miguel is a 59 year old female with a pertinent history of appendicitis, DM II, HTN, HLD, obesity, and liver disease who presents to this ED via private vehicle for evaluation of abdominal pain.    The patient reports she has had abdominal pain since last night. She endorses around 24:00 yesterday she was sitting down in a recliner and fell backwards. She notes having abdominal pain, chills, diaphoresis, nausea, diarrhea, and a few episodes of vomiting since then. The patient states she took Tylenol today which helped a little with her symptoms. The patient notes having an appendectomy surgery on 12/22/24 (8 days ago). She denies having any bleeding from her incisions and having a fever.      REVIEW OF SYSTEMS   Review of Systems   See HPI.  PAST MEDICAL HISTORY:  Past Medical History:   Diagnosis Date    Atopic eczema     Bipolar 1 disorder (H)     COVID-19 12/22/2020    Diabetes mellitus, type II (H)     GERD (gastroesophageal reflux disease)     Hyperlipidemia     Hypertension     Liver disease     Obesity     Schizophrenia (H)     Sleep apnea        PAST SURGICAL HISTORY:  Past Surgical History:   Procedure Laterality Date    ANKLE FRACTURE SURGERY      LAPAROSCOPIC APPENDECTOMY N/A 12/22/2024    Procedure: APPENDECTOMY, LAPAROSCOPIC;  Surgeon: Juarez Rocha MD;  Location:  OR  "          CURRENT MEDICATIONS:    acetaminophen (TYLENOL) 500 MG tablet  aspirin 81 MG EC tablet  atorvastatin (LIPITOR) 10 MG tablet  Blood Glucose Monitoring Suppl (BLOOD GLUCOSE MONITOR SYSTEM) w/Device KIT  cholecalciferol (VITAMIN D3) 1250 mcg (21140 units) capsule  cloZAPine (CLOZARIL) 25 MG tablet  enalapril (VASOTEC) 2.5 MG tablet  ferrous sulfate (FEROSUL) 325 (65 Fe) MG tablet  furosemide (LASIX) 20 MG tablet  gabapentin (NEURONTIN) 600 MG tablet  insulin glargine 100 UNIT/ML pen  OLANZapine (ZYPREXA) 15 MG tablet  pioglitazone (ACTOS) 45 MG tablet  Semaglutide, 1 MG/DOSE, (OZEMPIC) 4 MG/3ML pen  senna-docusate (SENOKOT-S/PERICOLACE) 8.6-50 MG tablet  traZODone (DESYREL) 100 MG tablet  venlafaxine (EFFEXOR-XR) 150 MG 24 hr capsule        ALLERGIES:  Allergies   Allergen Reactions    Other Environmental Allergy Unknown     pineapple    Penicillins Rash       FAMILY HISTORY:  Family History   Problem Relation Age of Onset    Brain Cancer Father     Pancreatic Cancer Maternal Grandmother     Brain Cancer Paternal Aunt        SOCIAL HISTORY:   Social History     Socioeconomic History    Marital status:    Tobacco Use    Smoking status: Former     Social Drivers of Health     Interpersonal Safety: Low Risk  (12/22/2024)    Interpersonal Safety     Do you feel physically and emotionally safe where you currently live?: Yes     Within the past 12 months, have you been hit, slapped, kicked or otherwise physically hurt by someone?: No     Within the past 12 months, have you been humiliated or emotionally abused in other ways by your partner or ex-partner?: No       VITALS:  BP (!) 202/91   Pulse 97   Temp 97  F (36.1  C) (Temporal)   Resp 20   Ht 1.651 m (5' 5\")   Wt 141.1 kg (311 lb)   SpO2 (!) 88%   BMI 51.75 kg/m      PHYSICAL EXAM    General presentation: Alert, Vital signs reviewed. NAD  HENT: ENT inspection is normal. Oropharynx is moist and clear.   Eye: Pupils are equal and reactive to light. " EOMI  Neck: The neck is supple, with full ROM, with no evidence of meningismus.  Pulmonary: Currently in no acute respiratory distress. Normal, non labored respirations, the lung sounds are normal with good equal air movement. Clear to auscultation bilaterally.   Circulatory: Regular rate and rhythm. Peripheral pulses are strong and equal. No murmurs, rubs, or gallops.   Abdominal: The abdomen is soft. No rigidity, guarding, or rebound. Bowel sounds normal. Mild suprapubic/periumbilical tenderness to palpation.  Neurologic: Alert, oriented to person, place, and time. No motor deficit. No sensory deficit. Cranial nerves II through XII are intact.  Musculoskeletal: No extremity tenderness. Full range of motion in all extremities. No extremity edema.   Skin: Skin color is normal. No rash. Warm. Dry to touch.      LAB:  All pertinent labs reviewed and interpreted.  Results for orders placed or performed during the hospital encounter of 12/31/24   CT Abdomen Pelvis w Contrast    Impression    IMPRESSION:  1.  Significantly distended stomach and multiple dilated small bowel loops, with a gradual transition into nondilated gas-filled small bowel loops in the right lower quadrant, findings could represent ileus versus early small bowel obstruction.  2.  Mild generalized anasarca.   UA with Microscopic reflex to Culture    Specimen: Urinary Bladder; Urine   Result Value Ref Range    Color Urine Yellow Colorless, Straw, Light Yellow, Yellow    Appearance Urine Clear Clear    Glucose Urine >1000 (A) Negative mg/dL    Bilirubin Urine Negative Negative    Ketones Urine 100 (A) Negative mg/dL    Specific Gravity Urine 1.030 1.001 - 1.030    Blood Urine 1.0 mg/dL (A) Negative    pH Urine 5.5 5.0 - 7.0    Protein Albumin Urine 30 (A) Negative mg/dL    Urobilinogen Urine <2.0 <2.0 mg/dL    Nitrite Urine Negative Negative    Leukocyte Esterase Urine Negative Negative    Mucus Urine Present (A) None Seen /LPF    RBC Urine >182 (H) <=2  /HPF    WBC Urine 1 <=5 /HPF    Squamous Epithelials Urine 1 <=1 /HPF    Transitional Epithelials Urine <1 <=1 /HPF   Basic metabolic panel   Result Value Ref Range    Sodium 129 (L) 135 - 145 mmol/L    Potassium 3.6 3.4 - 5.3 mmol/L    Chloride 84 (L) 98 - 107 mmol/L    Carbon Dioxide (CO2) 30 (H) 22 - 29 mmol/L    Anion Gap 15 7 - 15 mmol/L    Urea Nitrogen 19.2 6.0 - 20.0 mg/dL    Creatinine 0.59 0.51 - 0.95 mg/dL    GFR Estimate >90 >60 mL/min/1.73m2    Calcium 11.1 (H) 8.8 - 10.4 mg/dL    Glucose 249 (H) 70 - 99 mg/dL   CBC with platelets and differential   Result Value Ref Range    WBC Count 13.1 (H) 4.0 - 11.0 10e3/uL    RBC Count 5.04 3.80 - 5.20 10e6/uL    Hemoglobin 13.7 11.7 - 15.7 g/dL    Hematocrit 41.1 35.0 - 47.0 %    MCV 82 78 - 100 fL    MCH 27.2 26.5 - 33.0 pg    MCHC 33.3 31.5 - 36.5 g/dL    RDW 12.9 10.0 - 15.0 %    Platelet Count 484 (H) 150 - 450 10e3/uL    % Neutrophils 92 %    % Lymphocytes 4 %    % Monocytes 4 %    % Eosinophils 0 %    % Basophils 0 %    % Immature Granulocytes 0 %    NRBCs per 100 WBC 0 <1 /100    Absolute Neutrophils 12.1 (H) 1.6 - 8.3 10e3/uL    Absolute Lymphocytes 0.5 (L) 0.8 - 5.3 10e3/uL    Absolute Monocytes 0.5 0.0 - 1.3 10e3/uL    Absolute Eosinophils 0.0 0.0 - 0.7 10e3/uL    Absolute Basophils 0.0 0.0 - 0.2 10e3/uL    Absolute Immature Granulocytes 0.1 <=0.4 10e3/uL    Absolute NRBCs 0.0 10e3/uL   Extra Blue Top Tube   Result Value Ref Range    Hold Specimen JIC    Extra Red Top Tube   Result Value Ref Range    Hold Specimen JIC    Influenza A/B, RSV and SARS-CoV2 PCR (COVID-19) Nasopharyngeal    Specimen: Nasopharyngeal; Swab   Result Value Ref Range    Influenza A PCR Negative Negative    Influenza B PCR Negative Negative    RSV PCR Negative Negative    SARS CoV2 PCR Negative Negative   Adult Type and Screen   Result Value Ref Range    ABO/RH(D) O POS     Antibody Screen Negative Negative    SPECIMEN EXPIRATION DATE [id number]        RADIOLOGY:  Reviewed all  pertinent imaging. Please see official radiology report.  CT Abdomen Pelvis w Contrast   Final Result   IMPRESSION:   1.  Significantly distended stomach and multiple dilated small bowel loops, with a gradual transition into nondilated gas-filled small bowel loops in the right lower quadrant, findings could represent ileus versus early small bowel obstruction.   2.  Mild generalized anasarca.          EKG:    None.      I, Andrés Thorpe , am serving as a scribe to document services personally performed by Roel Hoffman DO based on my observation and the provider's statements to me. I, Roel Hoffman, attest that Andrés Thorpe is acting in a scribe capacity, has observed my performance of the services and has documented them in accordance with my direction.    Roel Hoffman DO  Emergency Medicine  [facility] EMERGENCY DEPARTMENT  [address] [place] [id number]  [phone]       Roel Hoffman DO  12/31/24 0442

## 2025-01-01 ENCOUNTER — APPOINTMENT (OUTPATIENT)
Dept: RADIOLOGY | Facility: HOSPITAL | Age: 60
DRG: 394 | End: 2025-01-01
Attending: SURGERY
Payer: MEDICARE

## 2025-01-01 VITALS
DIASTOLIC BLOOD PRESSURE: 64 MMHG | SYSTOLIC BLOOD PRESSURE: 138 MMHG | HEIGHT: 65 IN | WEIGHT: 293 LBS | HEART RATE: 81 BPM | TEMPERATURE: 98.1 F | BODY MASS INDEX: 48.82 KG/M2 | OXYGEN SATURATION: 94 % | RESPIRATION RATE: 21 BRPM

## 2025-01-01 LAB
ALBUMIN SERPL BCG-MCNC: 3.7 G/DL (ref 3.5–5.2)
ALP SERPL-CCNC: 91 U/L (ref 40–150)
ALT SERPL W P-5'-P-CCNC: 30 U/L (ref 0–50)
ANION GAP SERPL CALCULATED.3IONS-SCNC: 9 MMOL/L (ref 7–15)
AST SERPL W P-5'-P-CCNC: 25 U/L (ref 0–45)
BASOPHILS # BLD AUTO: 0 10E3/UL (ref 0–0.2)
BASOPHILS NFR BLD AUTO: 0 %
BILIRUB SERPL-MCNC: 0.2 MG/DL
BUN SERPL-MCNC: 19.8 MG/DL (ref 6–20)
CALCIUM SERPL-MCNC: 10.1 MG/DL (ref 8.8–10.4)
CHLORIDE SERPL-SCNC: 92 MMOL/L (ref 98–107)
CREAT SERPL-MCNC: 0.73 MG/DL (ref 0.51–0.95)
EGFRCR SERPLBLD CKD-EPI 2021: >90 ML/MIN/1.73M2
EOSINOPHIL # BLD AUTO: 0 10E3/UL (ref 0–0.7)
EOSINOPHIL NFR BLD AUTO: 0 %
ERYTHROCYTE [DISTWIDTH] IN BLOOD BY AUTOMATED COUNT: 13 % (ref 10–15)
GLUCOSE BLDC GLUCOMTR-MCNC: 141 MG/DL (ref 70–99)
GLUCOSE BLDC GLUCOMTR-MCNC: 146 MG/DL (ref 70–99)
GLUCOSE BLDC GLUCOMTR-MCNC: 177 MG/DL (ref 70–99)
GLUCOSE BLDC GLUCOMTR-MCNC: 200 MG/DL (ref 70–99)
GLUCOSE SERPL-MCNC: 149 MG/DL (ref 70–99)
HCO3 SERPL-SCNC: 32 MMOL/L (ref 22–29)
HCT VFR BLD AUTO: 39.6 % (ref 35–47)
HGB BLD-MCNC: 12.9 G/DL (ref 11.7–15.7)
IMM GRANULOCYTES # BLD: 0 10E3/UL
IMM GRANULOCYTES NFR BLD: 0 %
LYMPHOCYTES # BLD AUTO: 1.3 10E3/UL (ref 0.8–5.3)
LYMPHOCYTES NFR BLD AUTO: 14 %
MAGNESIUM SERPL-MCNC: 2 MG/DL (ref 1.7–2.3)
MCH RBC QN AUTO: 27.2 PG (ref 26.5–33)
MCHC RBC AUTO-ENTMCNC: 32.6 G/DL (ref 31.5–36.5)
MCV RBC AUTO: 84 FL (ref 78–100)
MONOCYTES # BLD AUTO: 0.9 10E3/UL (ref 0–1.3)
MONOCYTES NFR BLD AUTO: 10 %
NEUTROPHILS # BLD AUTO: 7 10E3/UL (ref 1.6–8.3)
NEUTROPHILS NFR BLD AUTO: 76 %
NRBC # BLD AUTO: 0 10E3/UL
NRBC BLD AUTO-RTO: 0 /100
PLATELET # BLD AUTO: 431 10E3/UL (ref 150–450)
POTASSIUM SERPL-SCNC: 3 MMOL/L (ref 3.4–5.3)
POTASSIUM SERPL-SCNC: 3.5 MMOL/L (ref 3.4–5.3)
PROT SERPL-MCNC: 6.5 G/DL (ref 6.4–8.3)
RBC # BLD AUTO: 4.74 10E6/UL (ref 3.8–5.2)
SODIUM SERPL-SCNC: 133 MMOL/L (ref 135–145)
WBC # BLD AUTO: 9.2 10E3/UL (ref 4–11)

## 2025-01-01 PROCEDURE — 82247 BILIRUBIN TOTAL: CPT | Performed by: STUDENT IN AN ORGANIZED HEALTH CARE EDUCATION/TRAINING PROGRAM

## 2025-01-01 PROCEDURE — 82435 ASSAY OF BLOOD CHLORIDE: CPT | Performed by: STUDENT IN AN ORGANIZED HEALTH CARE EDUCATION/TRAINING PROGRAM

## 2025-01-01 PROCEDURE — 85014 HEMATOCRIT: CPT | Performed by: STUDENT IN AN ORGANIZED HEALTH CARE EDUCATION/TRAINING PROGRAM

## 2025-01-01 PROCEDURE — 82947 ASSAY GLUCOSE BLOOD QUANT: CPT | Performed by: STUDENT IN AN ORGANIZED HEALTH CARE EDUCATION/TRAINING PROGRAM

## 2025-01-01 PROCEDURE — 84295 ASSAY OF SERUM SODIUM: CPT | Performed by: STUDENT IN AN ORGANIZED HEALTH CARE EDUCATION/TRAINING PROGRAM

## 2025-01-01 PROCEDURE — 85004 AUTOMATED DIFF WBC COUNT: CPT | Performed by: STUDENT IN AN ORGANIZED HEALTH CARE EDUCATION/TRAINING PROGRAM

## 2025-01-01 PROCEDURE — 250N000013 HC RX MED GY IP 250 OP 250 PS 637: Performed by: STUDENT IN AN ORGANIZED HEALTH CARE EDUCATION/TRAINING PROGRAM

## 2025-01-01 PROCEDURE — 74018 RADEX ABDOMEN 1 VIEW: CPT

## 2025-01-01 PROCEDURE — 99239 HOSP IP/OBS DSCHRG MGMT >30: CPT | Performed by: STUDENT IN AN ORGANIZED HEALTH CARE EDUCATION/TRAINING PROGRAM

## 2025-01-01 PROCEDURE — 83735 ASSAY OF MAGNESIUM: CPT | Performed by: STUDENT IN AN ORGANIZED HEALTH CARE EDUCATION/TRAINING PROGRAM

## 2025-01-01 PROCEDURE — 84132 ASSAY OF SERUM POTASSIUM: CPT | Performed by: STUDENT IN AN ORGANIZED HEALTH CARE EDUCATION/TRAINING PROGRAM

## 2025-01-01 PROCEDURE — 99231 SBSQ HOSP IP/OBS SF/LOW 25: CPT | Performed by: SURGERY

## 2025-01-01 PROCEDURE — 36415 COLL VENOUS BLD VENIPUNCTURE: CPT | Performed by: STUDENT IN AN ORGANIZED HEALTH CARE EDUCATION/TRAINING PROGRAM

## 2025-01-01 RX ORDER — FUROSEMIDE 20 MG/1
20 TABLET ORAL
Status: SHIPPED
Start: 2025-01-01

## 2025-01-01 RX ORDER — POTASSIUM CHLORIDE 1500 MG/1
20 TABLET, EXTENDED RELEASE ORAL ONCE
Status: COMPLETED | OUTPATIENT
Start: 2025-01-01 | End: 2025-01-01

## 2025-01-01 RX ORDER — CLOZAPINE 25 MG/1
25 TABLET ORAL AT BEDTIME
Status: SHIPPED
Start: 2025-01-01

## 2025-01-01 RX ORDER — POTASSIUM CHLORIDE 1500 MG/1
40 TABLET, EXTENDED RELEASE ORAL ONCE
Status: COMPLETED | OUTPATIENT
Start: 2025-01-01 | End: 2025-01-01

## 2025-01-01 RX ORDER — POTASSIUM CHLORIDE 7.45 MG/ML
10 INJECTION INTRAVENOUS
Status: DISCONTINUED | OUTPATIENT
Start: 2025-01-01 | End: 2025-01-01 | Stop reason: ALTCHOICE

## 2025-01-01 RX ADMIN — GABAPENTIN 600 MG: 300 CAPSULE ORAL at 14:08

## 2025-01-01 RX ADMIN — POTASSIUM CHLORIDE 20 MEQ: 1500 TABLET, EXTENDED RELEASE ORAL at 14:08

## 2025-01-01 RX ADMIN — POTASSIUM CHLORIDE 40 MEQ: 1500 TABLET, EXTENDED RELEASE ORAL at 10:53

## 2025-01-01 ASSESSMENT — ACTIVITIES OF DAILY LIVING (ADL)
ADLS_ACUITY_SCORE: 56
ADLS_ACUITY_SCORE: 60
ADLS_ACUITY_SCORE: 56

## 2025-01-01 NOTE — PLAN OF CARE
"End of Shift Summary.  For vital signs and complete assessments, please see documentation flowsheets.      Pertinent assessments: A&Ox4, forgetful at times. Has been up walking in halls 3-4x tonight, states walking helps w/ pain & bloating feeling. NGT to LCWS per orders, putting out >2L green bile overnight. Pt has intermittent nausea, but no vomiting, zofran given x1. Gastrograffin xrays done & showing contrast moving through bowel.  at bedside assisting patient. Has needed only tylenol for pain tonight, refused x1 dose of oxy. Voiding adequately.  Plan (Upcoming Events): continue to ambulate, advance diet per gen surg.   Discharge/Transfer Needs: TBD, home w/ .       Problem: Adult Inpatient Plan of Care  Goal: Plan of Care Review  Description: The Plan of Care Review/Shift note should be completed every shift.  The Outcome Evaluation is a brief statement about your assessment that the patient is improving, declining, or no change.  This information will be displayed automatically on your shift  note.  Outcome: Progressing  Goal: Patient-Specific Goal (Individualized)  Description: You can add care plan individualizations to a care plan. Examples of Individualization might be:  \"Parent requests to be called daily at 9am for status\", \"I have a hard time hearing out of my right ear\", or \"Do not touch me to wake me up as it startles  me\".  Outcome: Progressing  Goal: Absence of Hospital-Acquired Illness or Injury  Outcome: Progressing  Intervention: Identify and Manage Fall Risk  Recent Flowsheet Documentation  Taken 12/31/2024 2000 by Jaclyn Duncan, RN  Safety Promotion/Fall Prevention:   activity supervised   increased rounding and observation   clutter free environment maintained   lighting adjusted   nonskid shoes/slippers when out of bed   room organization consistent   safety round/check completed  Intervention: Prevent Skin Injury  Recent Flowsheet Documentation  Taken 12/31/2024 2002 by " Jaclyn Duncan, RN  Body Position: position changed independently  Intervention: Prevent and Manage VTE (Venous Thromboembolism) Risk  Recent Flowsheet Documentation  Taken 12/31/2024 2000 by Jaclyn Duncan, RN  VTE Prevention/Management:   SCDs off (sequential compression devices)   patient refused intervention  Goal: Optimal Comfort and Wellbeing  Outcome: Progressing  Intervention: Monitor Pain and Promote Comfort  Recent Flowsheet Documentation  Taken 12/31/2024 2153 by Jaclyn Duncan, RN  Pain Management Interventions: medication (see MAR)  Taken 12/31/2024 2002 by Jaclyn Duncan, RN  Pain Management Interventions: medication offered but refused  Goal: Readiness for Transition of Care  Outcome: Progressing

## 2025-01-01 NOTE — PROGRESS NOTES
Pt does not wear cpap @ home.  Is nauseous and declines cpap.     Bee Joseph, RT on 12/31/2024 at 10:15 PM

## 2025-01-01 NOTE — PLAN OF CARE
A&O. A1. NG tube low continuous suction, restarted 2 hours after gastrografin. . Zofran x1, dry heave intermit. NPO

## 2025-01-01 NOTE — DISCHARGE SUMMARY
"[facility]  Hospitalist Discharge Summary      Date of Admission:  12/31/2024  Date of Discharge:  1/1/2025  Discharging Provider: Kenya Perez MD  Discharge Service: Hospitalist Service    Discharge Diagnoses   Post op ileus vs possible early sbo    Clinically Significant Risk Factors     # Severe Obesity: Estimated body mass index is 51.43 kg/m  as calculated from the following:    Height as of this encounter: 1.651 m (5' 5\").    Weight as of this encounter: 140.2 kg (309 lb 1.4 oz).       Follow-ups Needed After Discharge   Follow-up Appointments       Hospital Follow-up with Existing Primary Care Provider (PCP)      Please see details below         Schedule Primary Care visit within: 7 Days   Recommended labs and Imaging (to be ordered by Primary Care Provider): BMP. Primary care provider to decide if to resume Actos. PCP to titrate BP medication.             Unresulted Labs Ordered in the Past 30 Days of this Admission       No orders found for last 31 day(s).            Discharge Disposition   Discharged to home  Condition at discharge: Stable    Hospital Course   Monica Miguel is a 59 year old female admitted on 12/31/2024. She presented with abdominal pain. Admitted for possible SBO  -- gastrografin challenge showed contrast in the colon  -- NGT clamped and tolerated CLD. Then NGT removed and tolerated full liquid diet. Pt ready for discharge to home  Possible SBO, likely postop ileus  Recent appendectomy   -- CT-a/p: Significantly distended stomach and multiple dilated small bowel loops, with a gradual transition into nondilated gas-filled small bowel loops in the right lower quadrant, findings could represent ileus versus early small bowel obstruction.   -- gastrografin challenge showed contrast in the colon  -- NGT clamped and tolerated CLD. Then NGT removed and tolerated full liquid diet.   -- Surgery cleared pt for discharge  Leukocytosis-resolved  -- Afebrile, likely " reactive  -- Monitor cbc and temp curve  Hyponatremia- improved  Hypochloremia  -- Likely 2/2 decreased intake, GI loss, Lasix  -- IVF. Monitor BMP. Check urine sodium and osm  -- Intake/output monitoring  Anasarca  -- Probnp: 124. Will hold actos for now as it is known to cause edema  -- TTE reviewed. LVEF 65%. Normal diastolic function.  -- Continue lasix as long as pt does not have diarrhea and have adequate intake  -- Likely 2/2 Actos- held for now. Defer to PCP.  DM II  Diabetic neuropathy  -- A1c 6.6% on 12/2/24  -- PTA Actos- held in light of anasarca. PTA semaglutide 1 mg weekly  -- PTA Insulin glargine 46 units every day  -- Gabapentin   Essential hypertension  -- PTA Enalapril- PCP to titrate  Hyperlipidemia  -- PTA Lipitor  Schizophrenia  Insomina  -- C/w PTA Clozapine, zyprexa, trazodone, venlafaxine   Patient is clinically and hemodynamically stable for discharge. Medication reconciliation was done. All labs and radiologic findings discussed with patient. Follow up appointments and recommendations as shown below. Patient/family verbalized understanding and agreed to plan of care. All questions answered.     Consultations This Hospital Stay   SURGERY GENERAL IP CONSULT    Code Status   Full Code    Time Spent on this Encounter   I, Kenya Perez MD, personally saw the patient today and spent greater than 30 minutes discharging this patient.       Kenya Perez MD  [facility]  [address] [place] [id number]  Phone: [phone]  Fax: [phone]  ______________________________________________________________________    Physical Exam   Vital Signs: Temp: 98.1  F (36.7  C) Temp src: Oral BP: 138/64 Pulse: 81   Resp: 21 SpO2: 94 % O2 Device: None (Room air)    Weight: 309 lbs 1.36 oz  GEN: Alert and oriented. Not in acute distress.  HEENT: Atraumatic, mucous membrane- moist and pink.  Chest: Bilateral air entry.  CVS: S1S2  regular.   Abdomen: Soft. Non-tender, non-distended. No organomegaly. No guarding or rigidity. Bowel sounds active.   Extremities: No pedal edema.  CNS: No involuntary movements.  Skin: no cyanosis or clubbing.        Primary Care Physician   Shiloh Reyes    Discharge Orders      Home Care Referral      Reason for your hospital stay    Possible SBO     Activity    Your activity upon discharge: activity as tolerated     Diet    Follow this diet upon discharge: Current Diet:Orders Placed This Encounter      Full Liquid Diet. May advance to low fiber diet as tolerated.     Hospital Follow-up with Existing Primary Care Provider (PCP)    Please see details below            Significant Results and Procedures       Discharge Medications   Current Discharge Medication List        CONTINUE these medications which have CHANGED    Details   cloZAPine (CLOZARIL) 25 MG tablet Take 1 tablet (25 mg) by mouth at bedtime. Please take Clozapine 12.5 mg at bedtime on 01/01/25 and 01/02/25, then resume your usual 25 mg at bedtime.    Associated Diagnoses: Schizophrenia, unspecified type (H)      furosemide (LASIX) 20 MG tablet Take 1 tablet (20 mg) by mouth 2 times daily. Do not take if you have significant diarrhea.    Associated Diagnoses: Anasarca           CONTINUE these medications which have NOT CHANGED    Details   acetaminophen (TYLENOL) 500 MG tablet Take 500-1,000 mg by mouth every 6 hours as needed for mild pain      aspirin 81 MG EC tablet Take 81 mg by mouth at bedtime.      atorvastatin (LIPITOR) 10 MG tablet Take 10 mg by mouth at bedtime.      Blood Glucose Monitoring Suppl (BLOOD GLUCOSE MONITOR SYSTEM) w/Device KIT       calcium carbonate (TUMS) 500 MG chewable tablet Take 1 chew tab by mouth 3 times daily as needed for heartburn.      cholecalciferol (VITAMIN D3) 1250 mcg (36561 units) capsule Take 50,000 Units by mouth once a week. On Fridays      enalapril (VASOTEC) 2.5 MG tablet Take 2.5 mg by mouth every  morning.      ferrous sulfate (FEROSUL) 325 (65 Fe) MG tablet Take 1 tablet by mouth at bedtime.      gabapentin (NEURONTIN) 600 MG tablet Take 600 mg by mouth 3 times daily       insulin glargine 100 UNIT/ML pen Inject 46 Units subcutaneously at bedtime.      OLANZapine (ZYPREXA) 15 MG tablet Take 15 mg by mouth At Bedtime      Semaglutide, 1 MG/DOSE, (OZEMPIC) 4 MG/3ML pen Inject 1 mg subcutaneously every 7 days. On Fridays      simethicone (MYLICON) 80 MG chewable tablet Take  mg by mouth every 6 hours as needed for flatulence or cramping.      traZODone (DESYREL) 100 MG tablet Take 100 mg by mouth at bedtime.      venlafaxine (EFFEXOR-XR) 150 MG 24 hr capsule Take 150 mg by mouth at bedtime.           STOP taking these medications       pioglitazone (ACTOS) 45 MG tablet Comments:   Reason for Stopping:             Allergies   Allergies   Allergen Reactions    Other Environmental Allergy Unknown     pineapple    Penicillins Rash

## 2025-01-01 NOTE — PROVIDER NOTIFICATION
Afib RVR per P3. Asymptomatic. Paged [facility].    Known Afib RVR, provider states page cards if symptomatic.

## 2025-01-01 NOTE — PROGRESS NOTES
General Surgery Progress Note  Hospital Day # 1    Subjective:   CC: Postoperative ileus  Status: Doing well, having multiple loose bowel movements, Gastrografin mated to the colon without difficulty    Vitals:    12/31/24 1946 12/31/24 2350 01/01/25 0816 01/01/25 0932   BP: (!) 170/75 (!) 140/63 138/64    BP Location: Right arm Right arm Left arm    Patient Position:  Sitting     Cuff Size:  Adult Regular     Pulse: 90 101 81    Resp: 16 20 21    Temp: 98.4  F (36.9  C) 97.7  F (36.5  C) 98.1  F (36.7  C)    TempSrc: Oral Oral Oral    SpO2: 98% 97% (!) 89% 94%   Weight:       Height:           Physical Exam:  General: NAD, pleasant  ABD: Soft, obese  EXT:no CCE    Recent Labs   Lab 01/01/25  0633   WBC 9.2   HGB 12.9   HCT 39.6          Recent Labs   Lab 01/01/25  0633   *   CO2 32*   BUN 19.8   ALBUMIN 3.7   ALKPHOS 91   ALT 30   AST 25       Assessment: Postoperative ileus-resolved    Plan: We can clamp her NG tube at this point.  If she is tolerating this after 3 to 4 hours and we can remove it and advance her diet.  She can likely discharge to home this afternoon.    Suhas Eng DO [facility]  [phone]

## 2025-01-01 NOTE — PROVIDER NOTIFICATION
Paged cross cover hospitalist:  pt is c/o constant nausea, is there anything else we can give her for nausea? Also anything stronger than tylenol for pain? Thanks. Jaclyn *[phone]

## 2025-01-02 ENCOUNTER — HOSPITAL ENCOUNTER (EMERGENCY)
Facility: CLINIC | Age: 60
DRG: 389 | End: 2025-01-02
Payer: MEDICARE

## 2025-01-02 VITALS
SYSTOLIC BLOOD PRESSURE: 116 MMHG | DIASTOLIC BLOOD PRESSURE: 68 MMHG | HEART RATE: 79 BPM | OXYGEN SATURATION: 93 % | TEMPERATURE: 97.5 F | RESPIRATION RATE: 16 BRPM

## 2025-01-02 DIAGNOSIS — K56.7 ILEUS (H): ICD-10-CM

## 2025-01-02 DIAGNOSIS — D50.9 IRON DEFICIENCY ANEMIA, UNSPECIFIED IRON DEFICIENCY ANEMIA TYPE: Primary | ICD-10-CM

## 2025-01-02 LAB
ALBUMIN SERPL BCG-MCNC: 3.3 G/DL (ref 3.5–5.2)
ALP SERPL-CCNC: 88 U/L (ref 40–150)
ALT SERPL W P-5'-P-CCNC: 29 U/L (ref 0–50)
ANION GAP SERPL CALCULATED.3IONS-SCNC: 9 MMOL/L (ref 7–15)
AST SERPL W P-5'-P-CCNC: 36 U/L (ref 0–45)
BASOPHILS # BLD AUTO: 0 10E3/UL (ref 0–0.2)
BASOPHILS NFR BLD AUTO: 0 %
BILIRUB SERPL-MCNC: 0.2 MG/DL
BUN SERPL-MCNC: 13.8 MG/DL (ref 6–20)
CALCIUM SERPL-MCNC: 9.2 MG/DL (ref 8.8–10.4)
CHLORIDE SERPL-SCNC: 95 MMOL/L (ref 98–107)
CREAT SERPL-MCNC: 0.57 MG/DL (ref 0.51–0.95)
EGFRCR SERPLBLD CKD-EPI 2021: >90 ML/MIN/1.73M2
EOSINOPHIL # BLD AUTO: 0 10E3/UL (ref 0–0.7)
EOSINOPHIL NFR BLD AUTO: 0 %
ERYTHROCYTE [DISTWIDTH] IN BLOOD BY AUTOMATED COUNT: 12.8 % (ref 10–15)
GLUCOSE SERPL-MCNC: 122 MG/DL (ref 70–99)
HCO3 SERPL-SCNC: 28 MMOL/L (ref 22–29)
HCT VFR BLD AUTO: 37.2 % (ref 35–47)
HGB BLD-MCNC: 12.3 G/DL (ref 11.7–15.7)
IMM GRANULOCYTES # BLD: 0 10E3/UL
IMM GRANULOCYTES NFR BLD: 0 %
LYMPHOCYTES # BLD AUTO: 1.4 10E3/UL (ref 0.8–5.3)
LYMPHOCYTES NFR BLD AUTO: 13 %
MCH RBC QN AUTO: 27.3 PG (ref 26.5–33)
MCHC RBC AUTO-ENTMCNC: 33.1 G/DL (ref 31.5–36.5)
MCV RBC AUTO: 83 FL (ref 78–100)
MONOCYTES # BLD AUTO: 0.8 10E3/UL (ref 0–1.3)
MONOCYTES NFR BLD AUTO: 7 %
NEUTROPHILS # BLD AUTO: 8.8 10E3/UL (ref 1.6–8.3)
NEUTROPHILS NFR BLD AUTO: 79 %
NRBC # BLD AUTO: 0 10E3/UL
NRBC BLD AUTO-RTO: 0 /100
PLATELET # BLD AUTO: 385 10E3/UL (ref 150–450)
POTASSIUM SERPL-SCNC: 4.3 MMOL/L (ref 3.4–5.3)
PROT SERPL-MCNC: 6.2 G/DL (ref 6.4–8.3)
RBC # BLD AUTO: 4.51 10E6/UL (ref 3.8–5.2)
SODIUM SERPL-SCNC: 132 MMOL/L (ref 135–145)
WBC # BLD AUTO: 11.1 10E3/UL (ref 4–11)

## 2025-01-02 PROCEDURE — 36415 COLL VENOUS BLD VENIPUNCTURE: CPT | Performed by: EMERGENCY MEDICINE

## 2025-01-02 PROCEDURE — 82374 ASSAY BLOOD CARBON DIOXIDE: CPT | Performed by: EMERGENCY MEDICINE

## 2025-01-02 PROCEDURE — 83036 HEMOGLOBIN GLYCOSYLATED A1C: CPT | Performed by: INTERNAL MEDICINE

## 2025-01-02 PROCEDURE — 82310 ASSAY OF CALCIUM: CPT | Performed by: EMERGENCY MEDICINE

## 2025-01-02 PROCEDURE — 99285 EMERGENCY DEPT VISIT HI MDM: CPT | Mod: 25

## 2025-01-02 PROCEDURE — 82435 ASSAY OF BLOOD CHLORIDE: CPT | Performed by: EMERGENCY MEDICINE

## 2025-01-02 PROCEDURE — 85018 HEMOGLOBIN: CPT | Performed by: EMERGENCY MEDICINE

## 2025-01-02 PROCEDURE — 85025 COMPLETE CBC W/AUTO DIFF WBC: CPT | Performed by: EMERGENCY MEDICINE

## 2025-01-02 PROCEDURE — 85004 AUTOMATED DIFF WBC COUNT: CPT | Performed by: EMERGENCY MEDICINE

## 2025-01-02 PROCEDURE — 80053 COMPREHEN METABOLIC PANEL: CPT | Performed by: EMERGENCY MEDICINE

## 2025-01-02 ASSESSMENT — ACTIVITIES OF DAILY LIVING (ADL): ADLS_ACUITY_SCORE: 54

## 2025-01-03 PROBLEM — K56.7 ILEUS (H): Status: ACTIVE | Noted: 2025-01-03

## 2025-01-03 LAB
ANION GAP SERPL CALCULATED.3IONS-SCNC: 8 MMOL/L (ref 7–15)
BUN SERPL-MCNC: 11.5 MG/DL (ref 6–20)
CALCIUM SERPL-MCNC: 8.6 MG/DL (ref 8.8–10.4)
CHLORIDE SERPL-SCNC: 98 MMOL/L (ref 98–107)
CREAT SERPL-MCNC: 0.62 MG/DL (ref 0.51–0.95)
EGFRCR SERPLBLD CKD-EPI 2021: >90 ML/MIN/1.73M2
ERYTHROCYTE [DISTWIDTH] IN BLOOD BY AUTOMATED COUNT: 12.8 % (ref 10–15)
EST. AVERAGE GLUCOSE BLD GHB EST-MCNC: 163 MG/DL
GLUCOSE BLDC GLUCOMTR-MCNC: 105 MG/DL (ref 70–99)
GLUCOSE BLDC GLUCOMTR-MCNC: 110 MG/DL (ref 70–99)
GLUCOSE BLDC GLUCOMTR-MCNC: 138 MG/DL (ref 70–99)
GLUCOSE BLDC GLUCOMTR-MCNC: 173 MG/DL (ref 70–99)
GLUCOSE BLDC GLUCOMTR-MCNC: 98 MG/DL (ref 70–99)
GLUCOSE SERPL-MCNC: 101 MG/DL (ref 70–99)
HBA1C MFR BLD: 7.3 %
HCO3 SERPL-SCNC: 29 MMOL/L (ref 22–29)
HCT VFR BLD AUTO: 35.4 % (ref 35–47)
HGB BLD-MCNC: 11.7 G/DL (ref 11.7–15.7)
MCH RBC QN AUTO: 27.7 PG (ref 26.5–33)
MCHC RBC AUTO-ENTMCNC: 33.1 G/DL (ref 31.5–36.5)
MCV RBC AUTO: 84 FL (ref 78–100)
PLATELET # BLD AUTO: 355 10E3/UL (ref 150–450)
POTASSIUM SERPL-SCNC: 3.3 MMOL/L (ref 3.4–5.3)
RBC # BLD AUTO: 4.23 10E6/UL (ref 3.8–5.2)
SODIUM SERPL-SCNC: 135 MMOL/L (ref 135–145)
WBC # BLD AUTO: 8.9 10E3/UL (ref 4–11)

## 2025-01-03 PROCEDURE — 82962 GLUCOSE BLOOD TEST: CPT

## 2025-01-03 PROCEDURE — G0378 HOSPITAL OBSERVATION PER HR: HCPCS

## 2025-01-03 PROCEDURE — 99233 SBSQ HOSP IP/OBS HIGH 50: CPT | Mod: 24 | Performed by: STUDENT IN AN ORGANIZED HEALTH CARE EDUCATION/TRAINING PROGRAM

## 2025-01-03 PROCEDURE — 258N000003 HC RX IP 258 OP 636: Performed by: INTERNAL MEDICINE

## 2025-01-03 PROCEDURE — 96374 THER/PROPH/DIAG INJ IV PUSH: CPT

## 2025-01-03 PROCEDURE — 85018 HEMOGLOBIN: CPT | Performed by: INTERNAL MEDICINE

## 2025-01-03 PROCEDURE — 80048 BASIC METABOLIC PNL TOTAL CA: CPT | Performed by: INTERNAL MEDICINE

## 2025-01-03 PROCEDURE — 36415 COLL VENOUS BLD VENIPUNCTURE: CPT | Performed by: INTERNAL MEDICINE

## 2025-01-03 PROCEDURE — 99207 PR NO BILLABLE SERVICE THIS VISIT: CPT | Performed by: INTERNAL MEDICINE

## 2025-01-03 PROCEDURE — 96376 TX/PRO/DX INJ SAME DRUG ADON: CPT

## 2025-01-03 PROCEDURE — 250N000011 HC RX IP 250 OP 636: Performed by: INTERNAL MEDICINE

## 2025-01-03 PROCEDURE — 250N000013 HC RX MED GY IP 250 OP 250 PS 637: Performed by: INTERNAL MEDICINE

## 2025-01-03 PROCEDURE — 99222 1ST HOSP IP/OBS MODERATE 55: CPT | Mod: A1 | Performed by: INTERNAL MEDICINE

## 2025-01-03 RX ORDER — TRAZODONE HYDROCHLORIDE 100 MG/1
100 TABLET ORAL AT BEDTIME
Status: DISCONTINUED | OUTPATIENT
Start: 2025-01-03 | End: 2025-01-10 | Stop reason: HOSPADM

## 2025-01-03 RX ORDER — OXYCODONE HYDROCHLORIDE 5 MG/1
5 TABLET ORAL EVERY 4 HOURS PRN
Status: DISCONTINUED | OUTPATIENT
Start: 2025-01-03 | End: 2025-01-10 | Stop reason: HOSPADM

## 2025-01-03 RX ORDER — NALOXONE HYDROCHLORIDE 0.4 MG/ML
0.4 INJECTION, SOLUTION INTRAMUSCULAR; INTRAVENOUS; SUBCUTANEOUS
Status: DISCONTINUED | OUTPATIENT
Start: 2025-01-03 | End: 2025-01-10 | Stop reason: HOSPADM

## 2025-01-03 RX ORDER — AMOXICILLIN 250 MG
1 CAPSULE ORAL 2 TIMES DAILY
Status: DISCONTINUED | OUTPATIENT
Start: 2025-01-03 | End: 2025-01-10 | Stop reason: HOSPADM

## 2025-01-03 RX ORDER — ACETAMINOPHEN 650 MG/1
650 SUPPOSITORY RECTAL EVERY 4 HOURS PRN
Status: DISCONTINUED | OUTPATIENT
Start: 2025-01-03 | End: 2025-01-10 | Stop reason: HOSPADM

## 2025-01-03 RX ORDER — NICOTINE POLACRILEX 4 MG
15-30 LOZENGE BUCCAL
Status: DISCONTINUED | OUTPATIENT
Start: 2025-01-03 | End: 2025-01-10 | Stop reason: HOSPADM

## 2025-01-03 RX ORDER — AMOXICILLIN 250 MG
1 CAPSULE ORAL 2 TIMES DAILY PRN
Status: DISCONTINUED | OUTPATIENT
Start: 2025-01-03 | End: 2025-01-03

## 2025-01-03 RX ORDER — AMOXICILLIN 250 MG
2 CAPSULE ORAL 2 TIMES DAILY PRN
Status: DISCONTINUED | OUTPATIENT
Start: 2025-01-03 | End: 2025-01-03

## 2025-01-03 RX ORDER — ACETAMINOPHEN 325 MG/1
650 TABLET ORAL EVERY 4 HOURS PRN
Status: DISCONTINUED | OUTPATIENT
Start: 2025-01-03 | End: 2025-01-10 | Stop reason: HOSPADM

## 2025-01-03 RX ORDER — LIDOCAINE 40 MG/G
CREAM TOPICAL
Status: DISCONTINUED | OUTPATIENT
Start: 2025-01-03 | End: 2025-01-10 | Stop reason: HOSPADM

## 2025-01-03 RX ORDER — CLOZAPINE 25 MG/1
25 TABLET ORAL AT BEDTIME
Status: DISCONTINUED | OUTPATIENT
Start: 2025-01-03 | End: 2025-01-10 | Stop reason: HOSPADM

## 2025-01-03 RX ORDER — ONDANSETRON 2 MG/ML
4 INJECTION INTRAMUSCULAR; INTRAVENOUS EVERY 6 HOURS PRN
Status: DISCONTINUED | OUTPATIENT
Start: 2025-01-03 | End: 2025-01-10 | Stop reason: HOSPADM

## 2025-01-03 RX ORDER — VENLAFAXINE HYDROCHLORIDE 150 MG/1
150 CAPSULE, EXTENDED RELEASE ORAL AT BEDTIME
Status: DISCONTINUED | OUTPATIENT
Start: 2025-01-03 | End: 2025-01-10 | Stop reason: HOSPADM

## 2025-01-03 RX ORDER — CALCIUM CARBONATE 500 MG/1
500 TABLET, CHEWABLE ORAL 3 TIMES DAILY PRN
Status: DISCONTINUED | OUTPATIENT
Start: 2025-01-03 | End: 2025-01-10 | Stop reason: HOSPADM

## 2025-01-03 RX ORDER — SODIUM CHLORIDE 9 MG/ML
INJECTION, SOLUTION INTRAVENOUS CONTINUOUS
Status: DISCONTINUED | OUTPATIENT
Start: 2025-01-03 | End: 2025-01-03

## 2025-01-03 RX ORDER — POTASSIUM CHLORIDE 1500 MG/1
40 TABLET, EXTENDED RELEASE ORAL ONCE
Status: COMPLETED | OUTPATIENT
Start: 2025-01-03 | End: 2025-01-03

## 2025-01-03 RX ORDER — PROCHLORPERAZINE MALEATE 10 MG
10 TABLET ORAL EVERY 6 HOURS PRN
Status: DISCONTINUED | OUTPATIENT
Start: 2025-01-03 | End: 2025-01-10 | Stop reason: HOSPADM

## 2025-01-03 RX ORDER — NALOXONE HYDROCHLORIDE 0.4 MG/ML
0.2 INJECTION, SOLUTION INTRAMUSCULAR; INTRAVENOUS; SUBCUTANEOUS
Status: DISCONTINUED | OUTPATIENT
Start: 2025-01-03 | End: 2025-01-10 | Stop reason: HOSPADM

## 2025-01-03 RX ORDER — OLANZAPINE 5 MG/1
15 TABLET ORAL AT BEDTIME
Status: DISCONTINUED | OUTPATIENT
Start: 2025-01-03 | End: 2025-01-10 | Stop reason: HOSPADM

## 2025-01-03 RX ORDER — POLYETHYLENE GLYCOL 3350 17 G/17G
17 POWDER, FOR SOLUTION ORAL 2 TIMES DAILY
Status: DISCONTINUED | OUTPATIENT
Start: 2025-01-03 | End: 2025-01-10 | Stop reason: HOSPADM

## 2025-01-03 RX ORDER — GABAPENTIN 600 MG/1
600 TABLET ORAL 3 TIMES DAILY
Status: DISCONTINUED | OUTPATIENT
Start: 2025-01-03 | End: 2025-01-10 | Stop reason: HOSPADM

## 2025-01-03 RX ORDER — ENALAPRIL MALEATE 2.5 MG/1
2.5 TABLET ORAL EVERY MORNING
Status: DISCONTINUED | OUTPATIENT
Start: 2025-01-04 | End: 2025-01-10 | Stop reason: HOSPADM

## 2025-01-03 RX ORDER — DEXTROSE MONOHYDRATE 25 G/50ML
25-50 INJECTION, SOLUTION INTRAVENOUS
Status: DISCONTINUED | OUTPATIENT
Start: 2025-01-03 | End: 2025-01-10 | Stop reason: HOSPADM

## 2025-01-03 RX ORDER — ONDANSETRON 4 MG/1
4 TABLET, ORALLY DISINTEGRATING ORAL EVERY 6 HOURS PRN
Status: DISCONTINUED | OUTPATIENT
Start: 2025-01-03 | End: 2025-01-10 | Stop reason: HOSPADM

## 2025-01-03 RX ADMIN — VENLAFAXINE HYDROCHLORIDE 150 MG: 150 CAPSULE, EXTENDED RELEASE ORAL at 22:41

## 2025-01-03 RX ADMIN — SODIUM CHLORIDE: 9 INJECTION, SOLUTION INTRAVENOUS at 03:41

## 2025-01-03 RX ADMIN — TRAZODONE HYDROCHLORIDE 100 MG: 100 TABLET ORAL at 22:41

## 2025-01-03 RX ADMIN — CLOZAPINE 25 MG: 25 TABLET ORAL at 22:41

## 2025-01-03 RX ADMIN — FAMOTIDINE 20 MG: 10 INJECTION, SOLUTION INTRAVENOUS at 16:37

## 2025-01-03 RX ADMIN — GABAPENTIN 600 MG: 600 TABLET, FILM COATED ORAL at 14:29

## 2025-01-03 RX ADMIN — SENNOSIDES AND DOCUSATE SODIUM 1 TABLET: 50; 8.6 TABLET ORAL at 20:00

## 2025-01-03 RX ADMIN — POTASSIUM CHLORIDE 40 MEQ: 1500 TABLET, EXTENDED RELEASE ORAL at 19:59

## 2025-01-03 RX ADMIN — GABAPENTIN 600 MG: 600 TABLET, FILM COATED ORAL at 19:59

## 2025-01-03 RX ADMIN — GABAPENTIN 600 MG: 600 TABLET, FILM COATED ORAL at 08:12

## 2025-01-03 RX ADMIN — FAMOTIDINE 20 MG: 10 INJECTION, SOLUTION INTRAVENOUS at 03:42

## 2025-01-03 RX ADMIN — POLYETHYLENE GLYCOL 3350 17 G: 17 POWDER, FOR SOLUTION ORAL at 20:00

## 2025-01-03 RX ADMIN — OLANZAPINE 15 MG: 15 TABLET, FILM COATED ORAL at 22:41

## 2025-01-03 ASSESSMENT — ACTIVITIES OF DAILY LIVING (ADL)
ADLS_ACUITY_SCORE: 54
ADLS_ACUITY_SCORE: 48
ADLS_ACUITY_SCORE: 54
ADLS_ACUITY_SCORE: 48
ADLS_ACUITY_SCORE: 54
ADLS_ACUITY_SCORE: 48
ADLS_ACUITY_SCORE: 48
ADLS_ACUITY_SCORE: 54
ADLS_ACUITY_SCORE: 48
ADLS_ACUITY_SCORE: 54
ADLS_ACUITY_SCORE: 48
ADLS_ACUITY_SCORE: 54
ADLS_ACUITY_SCORE: 47
ADLS_ACUITY_SCORE: 54

## 2025-01-03 NOTE — PROGRESS NOTES
RECEIVING UNIT ED HANDOFF REVIEW    ED Nurse Handoff Report was reviewed by: Emilia Schultz RN on January 3, 2025 at 2:38 PM

## 2025-01-03 NOTE — ED TRIAGE NOTES
Pt c/o abd pain since Monday, pt recently admitted for an illus on Monday, pt had appendectomy in mid December, abd pain increasing since dinner last night, ABCD intact.       Triage Assessment (Adult)       Row Name 01/02/25 6669          Triage Assessment    Airway WDL WDL        Respiratory WDL    Respiratory WDL WDL        Skin Circulation/Temperature WDL    Skin Circulation/Temperature WDL WDL        Cardiac WDL    Cardiac WDL WDL        Peripheral/Neurovascular WDL    Peripheral Neurovascular WDL WDL        Cognitive/Neuro/Behavioral WDL    Cognitive/Neuro/Behavioral WDL WDL

## 2025-01-03 NOTE — PROGRESS NOTES
[facility]    Medicine Progress Note - Hospitalist Service    Date of Admission:  1/2/2025    Assessment & Plan    Monica is a 59-year-old female with a history of type 2 diabetes mellitus, hypertension, hyperlipidemia and recent laparoscopic appendectomy 12/22/2024 who was admitted to [facility] on 1/2/2025 for acute abdominal pain/distention secondary to ileus versus distal SBO.  If tolerating diet, can discharge as early as 1/4/2025    Postop ileus versus early small bowel obstruction in setting of recent laparoscopic appendectomy on 12/22/2024  - She underwent uneventful laparoscopic appendectomy on 12/22/2024 by Dr. Rocha  - Recent admission [facility] 12/31/2024 - 1/1/2024 for SBO managed conservatively with general surgery  - CT AP W1/2/25: Ileus versus partial distal SBO with transition point terminal ileum  Plan  - General surgery evaluated and recommended initiation of CLD and aggressive bowel regimen. Will start Senna-docusate BID + Miralax BID  - Discontinue NS 75 cc/h    Diabetes mellitus type 2 complicated by neuropathy  - PTA meds needs to be verified by the pharmacy but apparently on insulin glargine 46 units at bedtime, Actos and Ozempic  - Hold PTA Tresiba and Ozempic  - Blood sugars 110  - Low-dose Lantus 10 units nightly, MDSSI qAC/qHS  - Check glucose qAC/qHS  - Continue PTA gabapentin 600 mg p.o. 3 times daily    Anasarca  - Unclear etiology  - Reports history of fatty liver but no evidence of liver cirrhosis on her recent CT abdomen scans  - Does have some chronic lower extremity edema for which she takes Lasix  - No history of CHF, recent echocardiogram on 12/31/24 showed normal ejection fraction  - She is on Actos which can lead to fluid overload/CHF exacerbation; she was advised to hold Actos for now and follow-up with PCP    Hyponatremia - resolved  Hypertension - Resume PTA enalapril with holding parameters  Dyslipidemia - PTA statin given n.p.o. status  Schizophrenia -  Resume PTA Zyprexa, trazodone, Effexor and Clozaril  Bipolar disorder   Insomnia    Observation Goals: -diagnostic tests and consults completed and resulted, -vital signs normal or at patient baseline, -tolerating oral intake to maintain hydration, Nurse to notify provider when observation goals have been met and patient is ready for discharge.  Diet: NPO for Medical/Clinical Reasons Except for: No Exceptions, Meds    DVT Prophylaxis: Pneumatic Compression Devices  Yi Catheter: Not present  Lines: None     Cardiac Monitoring: None  Code Status: Full Code          Disposition Plan     Medically Ready for Discharge: Anticipate tomorrow (if improved flatulence, bowel movement and abdominal pain    Dee Rocha DO  Hospitalist Service  [facility]  Securely message with UserTesting (more info)  Text page via [employer] Paging/Directory   ______________________________________________________________________    Interval History   Admitted overnight    On evaluation this afternoon, is resting on the bed.  Is excited about potential discharge tomorrow pending evaluation of abdominal pain.  Able to pass gas as well as have several bowel movements earlier today.  Abdomen still distended, but improved from initial admission.  No fevers, chills.  Able to tolerate Jell-O today.    Physical Exam   Vital Signs: Temp: 97.8  F (36.6  C) Temp src: Oral BP: 138/68 Pulse: 78   Resp: 18 SpO2: 94 % O2 Device: None (Room air)    Weight: 0 lbs 0 oz    General Appearance:  Awake/alert, no acute distress  Respiratory: Bilateral air entry, no wheezing, no rales, no crackles  Cardiovascular: S1-S2, RRR, no murmurs, rubs  GI: Abdomen is distended, mild tender to palpation, no rebound, bowel sounds are present  Skin: No rashes, no cyanosis  Neuro: AAOx3, no focal neurological deficits    Medical Decision Making       60 MINUTES SPENT BY ME on the date of service doing chart review, history, exam, documentation & further  activities per the note.      Data   ------------------------- PAST 24 HR DATA REVIEWED -----------------------------------------------

## 2025-01-03 NOTE — ED PROVIDER NOTES
"  Emergency Department Note      History of Present Illness     Chief Complaint   Abdominal Pain      HPI   Monica Miguel is a 59 year old female with a history of type 2 diabetes mellitus, hyperlipidemia, and hypertension presenting to the ED for the evaluation of abdominal pain. The patient states that she recently had an appendectomy here on 12/22/24 and a likely post-op ileus for which she was seen at [facility] in [place] from 12/30/24-1/1/25. The patient was seen at urgent care earlier today where they performed a CT scan and determined that she has either a small bowel obstruction vs ileus. She states that she was unaware of her post-op full liquid diet instructions and therefore ate chicken and rice for dinner on 1/1/25. [place] through this dinner, she began feeling her stomach \"balloon up\". As the next day went on, she began experiencing worsening bloating as well as decreased stool output, which she notes is similar to how she felt prior to her visit to [facility]. Monica does report having a small amount of diarrhea and passing some gas yesterday. She also reports mild abdominal pain, though does not appear to be concerned about the severity of this symptom. Since yesterday, she has not had anything to eat or drink other than a glass of milk yesterday morning. The patient states that she thinks it may have been better for her to stay an additional day at the hospital before coming home. Unrelated to her other symptoms, she notes that she has baseline difficulty moving on her own but that this has been worsened since her appendectomy.    Independent Historian   None    Review of External Notes   Reviewed urgency room notes from earlier in the day.    Past Medical History     Medical History and Problem List   Atopic eczema  Bipolar I disorder  COVID-19  Diabetes mellitus type 2  GERD  Hyperlipidemia  Hypertension  Illeus  Liver disease  Obesity  Schizophrenia  Small bowel obstruction  FILIPE    Medications "   Aspirin  Atorvastatin  Clozapine  Cyclobenzaprine  Enalapril  Furosemide  Gabapentin  Insulin  Olanzapine  Pioglitazone  Semaglutide  Simethicone  Trazodone  Venlafaxine  Zolpidem    Surgical History   Past Surgical History:   Procedure Laterality Date    ANKLE FRACTURE SURGERY      LAPAROSCOPIC APPENDECTOMY N/A 12/22/2024    Procedure: APPENDECTOMY, LAPAROSCOPIC;  Surgeon: Juarez Rocha MD;  Location:  OR     Physical Exam     Patient Vitals for the past 24 hrs:   BP Temp Temp src Pulse Resp SpO2   01/02/25 2249 116/68 97.5  F (36.4  C) Oral 79 16 93 %     Physical Exam  General: Resting on the gurney, appears somewhat uncomfortable  Head:  The scalp, face, and head appear normal  Mouth/Throat: Mucus membranes are moist  CV:  Regular rate    Normal S1 and S2  No pathological murmur   Resp:  Breath sounds clear and equal bilaterally    Non-labored, no retractions or accessory muscle use    No coarseness    No wheezing   GI:  Abdomen is obese and distended.  Moderate tenderness to palpation throughout without focal tenderness.  No guarding no rebound.  MS:  Normal motor assessment of all extremities.    Good capillary refill noted.  Skin:  No rash or lesions noted.  Neuro:   Speech is normal and fluent. No apparent deficit.  Psych: Awake. Alert.  Normal affect.      Appropriate interactions.      Diagnostics     Lab Results   Labs Ordered and Resulted from Time of ED Arrival to Time of ED Departure   COMPREHENSIVE METABOLIC PANEL - Abnormal       Result Value    Sodium 132 (*)     Potassium 4.3      Carbon Dioxide (CO2) 28      Anion Gap 9      Urea Nitrogen 13.8      Creatinine 0.57      GFR Estimate >90      Calcium 9.2      Chloride 95 (*)     Glucose 122 (*)     Alkaline Phosphatase 88      AST 36      ALT 29      Protein Total 6.2 (*)     Albumin 3.3 (*)     Bilirubin Total 0.2     CBC WITH PLATELETS AND DIFFERENTIAL - Abnormal    WBC Count 11.1 (*)     RBC Count 4.51      Hemoglobin 12.3      Hematocrit 37.2       MCV 83      MCH 27.3      MCHC 33.1      RDW 12.8      Platelet Count 385      % Neutrophils 79      % Lymphocytes 13      % Monocytes 7      % Eosinophils 0      % Basophils 0      % Immature Granulocytes 0      NRBCs per 100 WBC 0      Absolute Neutrophils 8.8 (*)     Absolute Lymphocytes 1.4      Absolute Monocytes 0.8      Absolute Eosinophils 0.0      Absolute Basophils 0.0      Absolute Immature Granulocytes 0.0      Absolute NRBCs 0.0           ED Course          Discussion of Management   Admitting Hospitalist, Dr. Contreras    ED Course   ED Course as of 01/03/25 0021   Fri Jan 03, 2025   0013 I obtained the history and examined the patient as noted above.          Additional Documentation  Social Determinants of Health: Healthcare Access/Compliance     Medical Decision Making / Diagnosis         LORENE Miguel is a 59 year old female who presents to the emergency department for abdominal pain and distention.  Patient had a recent appendectomy and then was readmitted for ileus versus small bowel obstruction.  She reports being unaware of her discharge instructions for a liquid diet and ate more than she should, specifically having eaten chicken and rice.  A CT scan was obtained in urgent care and she was found to have recurrent ileus versus small bowel obstruction.  She was sent here for admission.  The hospitalist was contacted for admission and she was made NPO.  She is not actively vomiting therefore NG tube was not placed..    Disposition   The patient was admitted to the hospital.     Diagnosis     ICD-10-CM    1. Ileus (H)  K56.7            Scribe Disclosure:  Deana MCKEON, am serving as a scribe at 1:07 AM on 1/3/2025 to document services personally performed by Janette Edwards MD based on my observations and the provider's statements to me.        Janette Edwards MD  01/03/25 [id number]

## 2025-01-03 NOTE — CONSULTS
[facility] General Surgery Consultation    Monica Miguel MRN# [id number]   YOB: 1965 Age: 59 year old      Date of Admission:  1/2/2025  Date of Consult: 1/3/2025         Assessment and Plan:   Monica Miguel is a 59 year old female recently s/p Lap appendectomy for acute appendicitis on 12/22/2024 who has been dealing with post-operative ileus that is likely multifactorial (not well controlled TIIDM, narcotics, lack of ambulation). She is being admitted to observation for another episode of ileus though prior to my visit she had a large BM and passing of gas. Abdomen is protuberant vs mildly distended. No acute abdomen.    PLAN:  No NG indicated for now.   Ok for clears. Patient encouraged to go slow.   Limit any narcotics  Aggressive bowel regimen  Ambulate atleast 4 times daily  Ok for dvt ppx  General surgery will continue to follow      Juarez Rocha MD           Requesting Physician:        Dee Rocha DO            Chief Complaint:     Chief Complaint   Patient presents with    Abdominal Pain            History of Present Illness:   Monica Miguel is a 59 year old female who was seen in consultation at the request of Dee Kong DO   who presented with abdominal burning pain and distension. She had undergone a lap appendectomy with me on 12/22/2024 that was overall routine. She did well post operatively and was discharged the next day. At that time she was passing gas and tolerating an oral diet. She presented to [facility] on 12/30 for abdominal pain, chills, nausea, vomiting, and obstipation. Work up at that time was concerning for ileus versus obstruction. An NG tube was placed and gastrograffin study was conducted that showed no obstruction. Her symptoms had resolved and she was discharged.     She presented to Urgent care today for similar symptoms of burning abdominal pain and distension. She said this started after she ate some chicken yesterday. Prior to  my visit though she reports that her distension had significantly improved after having a large bowel movement and passing gas. She is usually pretty regular prior to surgery. Due to the surgery, she hasn't been ambulating much and just stopped taking narcotics just when she presented to [facility].    Currently she denies any fevers, nausea, vomiting or abdominal pain.          Physical Exam:   Blood pressure 138/68, pulse 78, temperature 97.8  F (36.6  C), temperature source Oral, resp. rate 18, SpO2 94%.  0 lbs 0 oz  General: Vital signs reviewed, in no apparent distress  Eyes: Anicteric  HENT: Normocephalic, atraumatic, trachea midline   Respiratory: Breathing nonlabored  Cardiovascular: Regular rate and rhythm  GI: Abdomen soft, protuberant, obese, mildly distended, nontender, no organomegaly. Incisions are clean, dry, intact with steristrips  Musculoskeletal: No gross deformities  Neurologic: Grossly nonfocal exam  Psychiatric: Normal mood, affect and insight  Integumentary: Warm and dry         Past Medical History:     Past Medical History:   Diagnosis Date    Atopic eczema     Bipolar 1 disorder (H)     COVID-19 12/22/2020    Diabetes mellitus, type II (H)     GERD (gastroesophageal reflux disease)     Hyperlipidemia     Hypertension     Liver disease     Obesity     Schizophrenia (H)     Sleep apnea             Past Surgical History:     Past Surgical History:   Procedure Laterality Date    ANKLE FRACTURE SURGERY      LAPAROSCOPIC APPENDECTOMY N/A 12/22/2024    Procedure: APPENDECTOMY, LAPAROSCOPIC;  Surgeon: Juarez Rocha MD;  Location:  OR            Current Medications:         Current Facility-Administered Medications   Medication Dose Route Frequency Provider Last Rate Last Admin    famotidine (PEPCID) injection 20 mg  20 mg Intravenous Q12H Belgica Contreras MD   20 mg at 01/03/25 0342    gabapentin (NEURONTIN) tablet 600 mg  600 mg Oral TID Belgica Contreras MD   600 mg at 01/03/25 0812     insulin aspart (NovoLOG) injection (RAPID ACTING)  1-7 Units Subcutaneous Q4H Belgica Contreras MD        insulin glargine (LANTUS PEN) injection 10 Units  10 Units Subcutaneous At Bedtime Belgica Contreras MD        OLANZapine (zyPREXA) tablet 15 mg  15 mg Oral At Bedtime Belgica Contreras MD        polyethylene glycol (MIRALAX) Packet 17 g  17 g Oral BID Dee Rocha DO        senna-docusate (SENOKOT-S/PERICOLACE) 8.6-50 MG per tablet 1 tablet  1 tablet Oral BID Dee Rocha DO        sodium chloride (PF) 0.9% PF flush 3 mL  3 mL Intracatheter Q8H Belgica Contreras MD        traZODone (DESYREL) tablet 100 mg  100 mg Oral At Bedtime Belgica Contreras MD        venlafaxine (EFFEXOR XR) 24 hr capsule 150 mg  150 mg Oral At Bedtime Belgica Contreras MD           Current Facility-Administered Medications   Medication Dose Route Frequency Provider Last Rate Last Admin    acetaminophen (TYLENOL) tablet 650 mg  650 mg Oral Q4H PRN Belgica Contreras MD        Or    acetaminophen (TYLENOL) Suppository 650 mg  650 mg Rectal Q4H PRN Belgica Contreras MD        calcium carbonate (TUMS) chewable tablet 500 mg  500 mg Oral TID PRN Belgica Contreras MD        glucose gel 15-30 g  15-30 g Oral Q15 Min PRN Belgica Contreras MD        Or    dextrose 50 % injection 25-50 mL  25-50 mL Intravenous Q15 Min PRN Belgica Contreras MD        Or    glucagon injection 1 mg  1 mg Subcutaneous Q15 Min PRN Belgica Contreras MD        lidocaine (LMX4) cream   Topical Q1H PRN Belgica Contreras MD        lidocaine 1 % 0.1-1 mL  0.1-1 mL Other Q1H PRN Belgica Contreras MD        naloxone (NARCAN) injection 0.2 mg  0.2 mg Intravenous Q2 Min PRN Belgica Contreras MD        Or    naloxone (NARCAN) injection 0.4 mg  0.4 mg Intravenous Q2 Min PRN Belgica Contreras MD        Or    naloxone (NARCAN) injection 0.2 mg  0.2 mg Intramuscular Q2 Min PRN Belgica Contreras MD        Or     naloxone (NARCAN) injection 0.4 mg  0.4 mg Intramuscular Q2 Min PRN Belgica Contreras MD        ondansetron (ZOFRAN ODT) ODT tab 4 mg  4 mg Oral Q6H PRN Belgica Contreras MD        Or    ondansetron (ZOFRAN) injection 4 mg  4 mg Intravenous Q6H PRN Belgica Contreras MD        oxyCODONE (ROXICODONE) tablet 5 mg  5 mg Oral Q4H PRN Belgica Contreras MD        oxyCODONE IR (ROXICODONE) half-tab 2.5 mg  2.5 mg Oral Q4H PRN Belgica Contreras MD        prochlorperazine (COMPAZINE) injection 10 mg  10 mg Intravenous Q6H PRN Belgica Contreras MD        Or    prochlorperazine (COMPAZINE) tablet 10 mg  10 mg Oral Q6H PRN Belgica Contreras MD        sodium chloride (PF) 0.9% PF flush 3 mL  3 mL Intracatheter q1 min prn Belgica Contreras MD                Home Medications:     Prior to Admission medications    Medication Sig Last Dose Taking? Auth Provider Long Term End Date   acetaminophen (TYLENOL) 500 MG tablet Take 500-1,000 mg by mouth every 6 hours as needed for mild pain  Yes Reported, Patient     aspirin 81 MG EC tablet Take 81 mg by mouth at bedtime. 1/2/2025 Evening Yes Reported, Patient     atorvastatin (LIPITOR) 10 MG tablet Take 10 mg by mouth at bedtime. 1/2/2025 Evening Yes Reported, Patient Yes    calcium carbonate (TUMS) 500 MG chewable tablet Take 1 chew tab by mouth 3 times daily as needed for heartburn.  Yes Reported, Patient     cholecalciferol (VITAMIN D3) 1250 mcg (26997 units) capsule Take 50,000 Units by mouth once a week. On Fridays  Yes Reported, Patient     cloZAPine (CLOZARIL) 25 MG tablet Take 1 tablet (25 mg) by mouth at bedtime. Please take Clozapine 12.5 mg at bedtime on 01/01/25 and 01/02/25, then resume your usual 25 mg at bedtime. 1/2/2025 Evening Yes Kenya Perez MD Yes    enalapril (VASOTEC) 2.5 MG tablet Take 2.5 mg by mouth every morning. 1/2/2025 Morning Yes Reported, Patient Yes    ferrous sulfate (FEROSUL) 325 (65 Fe) MG tablet Take 1 tablet by  mouth at bedtime. 1/2/2025 Evening Yes Reported, Patient     furosemide (LASIX) 20 MG tablet Take 1 tablet (20 mg) by mouth 2 times daily. Do not take if you have significant diarrhea. 1/2/2025 Evening Yes Kenya Perez MD Yes    gabapentin (NEURONTIN) 600 MG tablet Take 600 mg by mouth 3 times daily  1/2/2025 Evening Yes Reported, Patient     insulin glargine 100 UNIT/ML pen Inject 46 Units subcutaneously at bedtime. 1/1/2025 Bedtime Yes Unknown, Entered By History Yes    OLANZapine (ZYPREXA) 15 MG tablet Take 15 mg by mouth At Bedtime 1/2/2025 Evening Yes Reported, Patient Yes    Semaglutide, 1 MG/DOSE, (OZEMPIC) 4 MG/3ML pen Inject 1 mg subcutaneously every 7 days. On Fridays  Yes Reported, Patient     simethicone (MYLICON) 80 MG chewable tablet Take  mg by mouth every 6 hours as needed for flatulence or cramping.  Yes Reported, Patient     traZODone (DESYREL) 100 MG tablet Take 100 mg by mouth at bedtime. 1/2/2025 Evening Yes Reported, Patient     venlafaxine (EFFEXOR-XR) 150 MG 24 hr capsule Take 150 mg by mouth at bedtime. 1/2/2025 Evening Yes Reported, Patient     Blood Glucose Monitoring Suppl (BLOOD GLUCOSE MONITOR SYSTEM) w/Device KIT    Reported, Patient              Allergies:     Allergies   Allergen Reactions    Other Environmental Allergy Unknown     pineapple    Penicillins Rash            Family History:     Family History   Problem Relation Age of Onset    Brain Cancer Father     Pancreatic Cancer Maternal Grandmother     Brain Cancer Paternal Aunt            Social History:   Monica Miguel  reports that she has quit smoking. She does not have any smokeless tobacco history on file.          Review of Systems:   Constitutional: No fevers or chills  Eyes: No blurred or double vision  HENT: Denies headaches, No rhinorrhea, No sore throat  Respiratory: No cough or shortness of breath  Cardiovascular: Denies chest pain or palpitations  Gastrointestinal: No abdominal pain or  nausea/vomiting  Genitourinary: No hematuria or dysuria  Musculoskeletal: Denies arthralgias or myalgias  Neurologic: No numbness or tingling  Integumentary: No skin rashes         Labs/Imaging   All new lab and imaging data was reviewed.   Recent Labs   Lab 01/03/25  0847 01/02/25  2257 01/01/25  0633   WBC 8.9 11.1* 9.2   HGB 11.7 12.3 12.9   HCT 35.4 37.2 39.6   MCV 84 83 84    385 431          Lab Results   Component Value Date     01/03/2025     01/02/2025     01/01/2025    Lab Results   Component Value Date    CHLORIDE 98 01/03/2025    CHLORIDE 95 01/02/2025    CHLORIDE 92 01/01/2025    CHLORIDE 104 02/09/2022    CHLORIDE 104 02/12/2021    CHLORIDE 101 01/15/2021    Lab Results   Component Value Date    BUN 11.5 01/03/2025    BUN 13.8 01/02/2025    BUN 19.8 01/01/2025    BUN 16 02/09/2022    BUN 16 02/12/2021    BUN 12 01/15/2021      Lab Results   Component Value Date    POTASSIUM 3.3 01/03/2025    POTASSIUM 4.3 01/02/2025    POTASSIUM 3.5 01/01/2025    POTASSIUM 5.1 02/09/2022    POTASSIUM 4.3 02/12/2021    POTASSIUM 4.2 01/15/2021    Lab Results   Component Value Date    CO2 29 01/03/2025    CO2 28 01/02/2025    CO2 32 01/01/2025    CO2 22 02/09/2022    CO2 25 02/12/2021    CO2 25 01/15/2021    Lab Results   Component Value Date    CR 0.62 01/03/2025    CR 0.57 01/02/2025    CR 0.73 01/01/2025          Unable to personally review CT as it is not in our system.

## 2025-01-03 NOTE — H&P
[facility]    History and Physical - Hospitalist Service       Date of Admission:  1/2/2025    Assessment & Plan      Monica Miguel is a 59 year old female admitted on 1/2/2025.   Monica Miguel is a 59 year old female with past medical history of hypertension, dyslipidemia, diabetes mellitus type 2, schizophrenia, bipolar disorder, insomnia, gastroesophageal reflux disease, fatty liver disease , post recent laparoscopic appendectomy on 12/22/2024 who presented to ER for evaluation of abdominal pain and abdominal distention.    # Abdominal distention/pain  # Postop ileus versus early small bowel obstruction  # Status post recent laparoscopic appendectomy on 12/22/2024  *She underwent uneventful laparoscopic appendectomy on 12/22/2024 by Dr. Rocha  *Admitted to [facility] from 12/31-/1/25 for similar complaints of abdominal distention and abdominal pain; CT of the abdomen showed significantly distended stomach and multiple dilated small bowel loops, with a gradual transition into nondilated gas-filled small bowel loops in the right lower quadrant, findings could represent ileus versus early small bowel obstruction.  NGT was placed.  She was seen by surgery, Gastrografin challenge done showed contrast in the colon.  NG tube was clamped and eventually removed.  She was started on clear liquid diet and advance to full liquid diet which she tolerated well.  She reports that she started having multiple episodes of diarrhea.  She was discharged on 1/1/2025 with recommendation to continue with full liquid diet for now, may advance to low fiber as tolerated.  -After she went home she ate rice and 2 drumsticks and after that she developed again abdominal pain and abdominal distention  -She passes gas and reports having multiple small bowel movements (diarrhea)  -Went to urgent care on 1/2 where repeat CT abdomen and pelvis showed again dilated loops of small bowel with gradual transition into the  terminal ileum. No abrupt transition. Colon shows residual contrast material but is not distended. Findings may be due to ileus or partial distal small bowel obstruction   -Reported some nausea but no real vomiting  -No fever  -No need for NG tube at this time  -Admit under observational status  -Keep n.p.o.  -Mild IV hydration NS at 75 cc/h  -Pepcid 20 mg IV twice daily  -Pain management  -Antinausea medications as needed  -Surgery consult  -Courage ambulation    # Diabetes mellitus type 2  # Diabetic neuropathy  -PTA meds needs to be verified by the pharmacy but apparently on insulin glargine 46 units at bedtime, Actos and Ozempic  -Hold PTA Tresiba and Ozempic  -Blood sugars 110  -Start low-dose Lantus 10 units nightly  -Check hemoglobin A1c  -Checks every 4 hours while NPO  -Insulin sliding scale  -Resume PTA gabapentin 600 mg p.o. 3 times daily    # Hypertension  -Resume PTA enalapril with holding parameters    # Dyslipidemia  -PTA statin given n.p.o. status    # Schizophrenia  # Bipolar disorder  # Insomnia  -Resume PTA Zyprexa, trazodone, Effexor and Clozaril    # Anasarca  -Unclear etiology  -Reports history of fatty liver but no evidence of liver cirrhosis on her recent CT abdomen scans  -Does have some chronic lower extremity edema for which she takes Lasix  -No history of CHF, recent echocardiogram on 12/31/24 showed normal ejection fraction  -She is on Actos which can lead to fluid overload/CHF exacerbation; she was advised to hold Actos for now and follow-up with PCP    # Hyponatremia  -Sodium 132  -Her sodium during her recent hospitalization varied between 127-133; it was felt that her low sodium was related to decreased p.o. intake, GI loss, Lasix  -Recent urine sodium less than 20, urine osmolality 876, serum osmolality 279  -BMP in a.m.          Diet:  npo except meds  DVT Prophylaxis: Pneumatic Compression Devices  Yi Catheter: Not present  Lines: None     Cardiac Monitoring: None  Code Status:  "  full code    Clinically Significant Risk Factors Present on Admission        # Hypokalemia: Lowest K = 3 mmol/L in last 2 days, will replace as needed  # Hyponatremia: Lowest Na = 132 mmol/L in last 2 days, will monitor as appropriate  # Hypochloremia: Lowest Cl = 92 mmol/L in last 2 days, will monitor as appropriate   # Hypercalcemia: corrected calcium is >10.1, will monitor as appropriate    # Hypoalbuminemia: Lowest albumin = 3.3 g/dL at 1/2/2025 10:57 PM, will monitor as appropriate   # Drug Induced Platelet Defect: home medication list includes an antiplatelet medication   # Hypertension: Noted on problem list           # Severe Obesity: Estimated body mass index is 51.43 kg/m  as calculated from the following:    Height as of 12/30/24: 1.651 m (5' 5\").    Weight as of 12/31/24: 140.2 kg (309 lb 1.4 oz).              Disposition Plan     Medically Ready for Discharge: Anticipated Tomorrow, pending return of bowel function.         Belgica Contreras MD  Hospitalist Service  [facility]  Securely message with St[employer] (more info)  Text page via [facility] Paging/Directory     ______________________________________________________________________    Chief Complaint   Abdominal pain and abdominal distention    History is obtained from the patient with relatively good historian.  I discussed with the ER attending Dr. Edwards and reviewed her EMR.    History of Present Illness   Monica Miguel is a 59 year old female with past medical history of hypertension, dyslipidemia, diabetes mellitus type 2, schizophrenia, bipolar disorder, insomnia, gastroesophageal reflux disease, fatty liver disease , post recent laparoscopic appendectomy on 12/22/2024 who presented to ER for evaluation of abdominal pain and abdominal distention.  The patient was recently admitted by surgery team from 12/21-12/23/2024 for acute appendicitis and underwent uncomplicated laparoscopic appendectomy on 12/22/2024.  It seems " that a few days after discharge she developed abdominal pain and distention.  She went to [facility].  The CT of the abdomen on 12/31 showed a significantly distended stomach and multiple dilated small bowel loops, with a gradual transition into nondilated gas-filled small bowel loops in the right lower quadrant, findings could represent ileus versus early small bowel obstruction.  NGT was placed.  She was seen by surgery, Gastrografin challenge done showed contrast in the colon.  NG tube was clamped and eventually removed.  She was started on clear liquid diet and advance to full liquid diet which she tolerated well.  She reports that she started having multiple episodes of diarrhea.  She was discharged on 1/1/2025 with recommendation to continue with full liquid diet for now, may advance to low fiber as tolerated.  She went home she ate some rice and two drumsticks.  She ate she started having abdominal distention and abdominal pain that got progressively worse on 1/2/2025.  She reports passing gas; she reports that she had 3 for small episodes of diarrhea yesterday.  She reported some mild nausea but no vomiting.  She denies chest pain, no fever at home.  She reported mild shortness of breath.  She reports some chronic lower extremity edema for which she takes Lasix.  She denies headache, no dysuria, no leg swellings.   He went to urgent care on 1/2/2025; CT abdomen- Again seen are multiple dilated loops of small bowel with gradual transition into the terminal ileum although no clear abrupt transition. There is residual contrast material within nondilated colon. Findings may be due to paralytic ileus or a partial distal small bowel obstruction.  She was sent to ER for further evaluation    *Of note, CT of the abdomen on 12/31st that showed mild anasarca and repeat CT abdomen on 1/2 showed persistent anasarca.  She does not have history of liver cirrhosis.  She states that she has a history of fatty  "liver.  She drinks alcohol occasionally.  She has some chronic lower extremity swelling for which she takes Lasix.  No history for CHF.  She did have an echocardiogram during her recent hospitalization on 12/31st which was normal.  During last hospitalization she was advised to stop taking Actos as it might contribute to fluid overload.      In ER she was evaluated [place].  Vital signs:  Temp: 97.5  F (36.4  C) Temp src: Oral BP: 116/68 Pulse: 79   Resp: 16 SpO2: 93 % O2 Device: None (Room air)        Estimated body mass index is 51.43 kg/m  as calculated from the following:    Height as of 12/30/24: 1.651 m (5' 5\").    Weight as of 12/31/24: 140.2 kg (309 lb 1.4 oz).     CBC RESULTS:   Recent Labs   Lab Test 01/02/25 2257   WBC 11.1*   RBC 4.51   HGB 12.3   HCT 37.2   MCV 83   MCH 27.3   MCHC 33.1   RDW 12.8         Last Comprehensive Metabolic Panel:  Lab Results   Component Value Date     (L) 01/02/2025    POTASSIUM 4.3 01/02/2025    CHLORIDE 95 (L) 01/02/2025    CO2 28 01/02/2025    ANIONGAP 9 01/02/2025     (H) 01/03/2025    BUN 13.8 01/02/2025    CR 0.57 01/02/2025    GFRESTIMATED >90 01/02/2025    JUAN DIEGO 9.2 01/02/2025      Liver Function Studies -   Recent Labs   Lab Test 01/02/25 2257   PROTTOTAL 6.2*   ALBUMIN 3.3*   BILITOTAL 0.2   ALKPHOS 88   AST 36   ALT 29      Hospitalist service was called regarding the admission.        Past Medical History    Past Medical History:   Diagnosis Date    Atopic eczema     Bipolar 1 disorder (H)     COVID-19 12/22/2020    Diabetes mellitus, type II (H)     GERD (gastroesophageal reflux disease)     Hyperlipidemia     Hypertension     Liver disease     Obesity     Schizophrenia (H)     Sleep apnea        Past Surgical History   Past Surgical History:   Procedure Laterality Date    ANKLE FRACTURE SURGERY      LAPAROSCOPIC APPENDECTOMY N/A 12/22/2024    Procedure: APPENDECTOMY, LAPAROSCOPIC;  Surgeon: Juarez Rocha MD;  Location:  OR       Prior to " Admission Medications   Prior to Admission Medications   Prescriptions Last Dose Informant Patient Reported? Taking?   Blood Glucose Monitoring Suppl (BLOOD GLUCOSE MONITOR SYSTEM) w/Device KIT  Self Yes No   OLANZapine (ZYPREXA) 15 MG tablet  Self Yes No   Sig: Take 15 mg by mouth At Bedtime   Semaglutide, 1 MG/DOSE, (OZEMPIC) 4 MG/3ML pen  Self Yes No   Sig: Inject 1 mg subcutaneously every 7 days. On Fridays   acetaminophen (TYLENOL) 500 MG tablet  Self Yes No   Sig: Take 500-1,000 mg by mouth every 6 hours as needed for mild pain   aspirin 81 MG EC tablet  Self Yes No   Sig: Take 81 mg by mouth at bedtime.   atorvastatin (LIPITOR) 10 MG tablet  Self Yes No   Sig: Take 10 mg by mouth at bedtime.   calcium carbonate (TUMS) 500 MG chewable tablet   Yes No   Sig: Take 1 chew tab by mouth 3 times daily as needed for heartburn.   cholecalciferol (VITAMIN D3) 1250 mcg (10787 units) capsule  Self Yes No   Sig: Take 50,000 Units by mouth once a week. On Fridays   cloZAPine (CLOZARIL) 25 MG tablet   No No   Sig: Take 1 tablet (25 mg) by mouth at bedtime. Please take Clozapine 12.5 mg at bedtime on 01/01/25 and 01/02/25, then resume your usual 25 mg at bedtime.   enalapril (VASOTEC) 2.5 MG tablet  Self Yes No   Sig: Take 2.5 mg by mouth every morning.   ferrous sulfate (FEROSUL) 325 (65 Fe) MG tablet  Self Yes No   Sig: Take 1 tablet by mouth at bedtime.   furosemide (LASIX) 20 MG tablet   No No   Sig: Take 1 tablet (20 mg) by mouth 2 times daily. Do not take if you have significant diarrhea.   gabapentin (NEURONTIN) 600 MG tablet  Self Yes No   Sig: Take 600 mg by mouth 3 times daily    insulin glargine 100 UNIT/ML pen  Self Yes No   Sig: Inject 46 Units subcutaneously at bedtime.   simethicone (MYLICON) 80 MG chewable tablet   Yes No   Sig: Take  mg by mouth every 6 hours as needed for flatulence or cramping.   traZODone (DESYREL) 100 MG tablet  Self Yes No   Sig: Take 100 mg by mouth at bedtime.   venlafaxine  (EFFEXOR-XR) 150 MG 24 hr capsule  Self Yes No   Sig: Take 150 mg by mouth at bedtime.      Facility-Administered Medications: None        Review of Systems    The 10 point Review of Systems is negative other than noted in the HPI or here.     Social History   I have reviewed this patient's social history and updated it with pertinent information if needed.  Social History     Tobacco Use    Smoking status: Former         Family History   I have reviewed this patient's family history and updated it with pertinent information if needed.  Family History   Problem Relation Age of Onset    Brain Cancer Father     Pancreatic Cancer Maternal Grandmother     Brain Cancer Paternal Aunt          Allergies   Allergies   Allergen Reactions    Other Environmental Allergy Unknown     pineapple    Penicillins Rash        Physical Exam   Vital Signs: Temp: 97.5  F (36.4  C) Temp src: Oral BP: 116/68 Pulse: 79   Resp: 16 SpO2: 93 % O2 Device: None (Room air)    Weight: 0 lbs 0 oz    General Appearance: Awake/alert, no acute distress  Respiratory: Bilateral air entry, no wheezing, no rales, no crackles  Cardiovascular: S1-S2, RRR, no murmurs, rubs  GI: Abdomen is distended, mild tender to palpation, no rebound, bowel sounds are present  Skin: No rashes, no cyanosis  Neuro: AAOx3, no focal neurological deficits    Medical Decision Making       MANAGEMENT DISCUSSED with the following over the past 24 hours: The patient, ER attending   NOTE(S)/MEDICAL RECORDS REVIEWED over the past 24 hours: Recent hospitalization notes  Tests REVIEWED in the past 24 hours:  - BMP  - CBC  - BNP  - LFTs  Tests personally interpreted in the past 24 hours:  - ABDOMINAL CT showing as above       Data     I have personally reviewed the following data over the past 24 hrs:    11.1 (H)  \   12.3   / 385     132 (L) 95 (L) 13.8 /  110 (H)   4.3 28 0.57 \     ALT: 29 AST: 36 AP: 88 TBILI: 0.2   ALB: 3.3 (L) TOT PROTEIN: 6.2 (L) LIPASE: N/A     TSH: N/A T4: N/A  A1C: 7.3 (H)       Imaging results reviewed over the past 24 hrs:   Recent Results (from the past 24 hours)   CT Abdomen Pelvis w Contrast    Narrative    For Patients: As a result of the 21st Century Cures Act, medical imaging exams and procedure reports are released immediately into your electronic medical record. You may view this report before your referring provider. If you have questions, please contact your health care provider.    EXAM: CT ABDOMEN PELVIS W  LOCATION: [facility] [place]  DATE: 1/2/2025    INDICATION: Abdominal pain, post-op.  COMPARISON: 12/31/2024.  TECHNIQUE: CT scan of the abdomen and pelvis was performed following injection of IV contrast. Multiplanar reformats were obtained. Dose reduction techniques were used.  CONTRAST: IOPAMIDOL 300 MG/ML  ML BOTTLE: 100mL    FINDINGS:   LOWER CHEST: Normal.    HEPATOBILIARY: Normal.    PANCREAS: Normal.    SPLEEN: Normal.    ADRENAL GLANDS: Normal.    KIDNEYS/BLADDER: Normal.    BOWEL: Again seen are multiple dilated loops of small bowel with gradual transition into the terminal ileum although no clear abrupt transition. There is residual contrast material within nondilated colon. Findings may be due to paralytic ileus or a partial distal small bowel obstruction. The mesenteric congestion seen has resolved.    LYMPH NODES: Normal.    VASCULATURE: Normal.    PELVIC ORGANS: Small exophytic uterine fibroid.    MUSCULOSKELETAL: Anasarca again seen. Small fat-containing periumbilical hernia.    Impression    1.  Again seen are dilated loops of small bowel with gradual transition into the terminal ileum. No abrupt transition. Colon shows residual contrast material but is not distended. Findings may be due to ileus or partial distal small bowel obstruction. The mesenteric congestion seen previously has resolved    2.  Persistent anasarca.

## 2025-01-03 NOTE — ED NOTES
[facility]  ED Nurse Handoff Report    ED Chief complaint: Abdominal Pain      ED Diagnosis:   Final diagnoses:   Ileus (H)       Code Status: to be addressed by admitting doctor    Allergies:   Allergies   Allergen Reactions    Other Environmental Allergy Unknown     pineapple    Penicillins Rash       Patient Story: Pt c/o abdominal pain since Monday, dx at that time with an Illus. Pt previously had Appendectomy in December.   Focused Assessment:  pt alert voices needs,abdominal pain.      Treatments and/or interventions provided: IV access labs  Patient's response to treatments and/or interventions: tolerating well    To be done/followed up on inpatient unit:  doctor orders    Does this patient have any cognitive concerns?:  none    Activity level - Baseline/Home:  Independent  Activity Level - Current:   Independent    Patient's Preferred language: English   Needed?: No    Isolation: None  Infection: Not Applicable  Patient tested for COVID 19 prior to admission: NO  Bariatric?: No    Vital Signs:   Vitals:    01/02/25 2249   BP: 116/68   Pulse: 79   Resp: 16   Temp: 97.5  F (36.4  C)   TempSrc: Oral   SpO2: 93%       Cardiac Rhythm:     Was the PSS-3 completed:   Yes  What interventions are required if any?               Family Comments: none  OBS brochure/video discussed/provided to patient/family: Yes              Name of person given brochure if not patient: Pt              Relationship to patient: Pt    For the majority of the shift this patient's behavior was Green.   Behavioral interventions performed were none.    ED NURSE PHONE NUMBER: [phone]

## 2025-01-03 NOTE — PHARMACY-ADMISSION MEDICATION HISTORY
Pharmacist Admission Medication History    Admission medication history is complete. The information provided in this note is only as accurate as the sources available at the time of the update.    Information Source(s): Patient, Hospital records, and CareEverywhere/SureScripts via in-person    Pertinent Information: Confirmed with patient that she has stopped taking Actos per discharge med instructions and took a half tablet of Clozaril last evening and the night before. Pt received all meds before going to urgent care last night other than Lantus dose.    Changes made to PTA medication list:  Added: None  Deleted: None  Changed: None    Allergies reviewed with patient and updates made in EHR: yes    Medication History Completed By: Mayte Benitez [facility] 1/3/2025 10:07 AM    PTA Med List   Medication Sig Last Dose/Taking    acetaminophen (TYLENOL) 500 MG tablet Take 500-1,000 mg by mouth every 6 hours as needed for mild pain Taking As Needed    aspirin 81 MG EC tablet Take 81 mg by mouth at bedtime. 1/2/2025 Evening    atorvastatin (LIPITOR) 10 MG tablet Take 10 mg by mouth at bedtime. 1/2/2025 Evening    calcium carbonate (TUMS) 500 MG chewable tablet Take 1 chew tab by mouth 3 times daily as needed for heartburn. Taking As Needed    cholecalciferol (VITAMIN D3) 1250 mcg (59379 units) capsule Take 50,000 Units by mouth once a week. On Fridays Taking    cloZAPine (CLOZARIL) 25 MG tablet Take 1 tablet (25 mg) by mouth at bedtime. Please take Clozapine 12.5 mg at bedtime on 01/01/25 and 01/02/25, then resume your usual 25 mg at bedtime. 1/2/2025 Evening    enalapril (VASOTEC) 2.5 MG tablet Take 2.5 mg by mouth every morning. 1/2/2025 Morning    ferrous sulfate (FEROSUL) 325 (65 Fe) MG tablet Take 1 tablet by mouth at bedtime. 1/2/2025 Evening    furosemide (LASIX) 20 MG tablet Take 1 tablet (20 mg) by mouth 2 times daily. Do not take if you have significant diarrhea. 1/2/2025 Evening    gabapentin (NEURONTIN) 600 MG  tablet Take 600 mg by mouth 3 times daily  1/2/2025 Evening    insulin glargine 100 UNIT/ML pen Inject 46 Units subcutaneously at bedtime. 1/1/2025 Bedtime    OLANZapine (ZYPREXA) 15 MG tablet Take 15 mg by mouth At Bedtime 1/2/2025 Evening    Semaglutide, 1 MG/DOSE, (OZEMPIC) 4 MG/3ML pen Inject 1 mg subcutaneously every 7 days. On Fridays Taking    simethicone (MYLICON) 80 MG chewable tablet Take  mg by mouth every 6 hours as needed for flatulence or cramping. Taking As Needed    traZODone (DESYREL) 100 MG tablet Take 100 mg by mouth at bedtime. 1/2/2025 Evening    venlafaxine (EFFEXOR-XR) 150 MG 24 hr capsule Take 150 mg by mouth at bedtime. 1/2/2025 Evening

## 2025-01-04 LAB
GLUCOSE BLDC GLUCOMTR-MCNC: 125 MG/DL (ref 70–99)
GLUCOSE BLDC GLUCOMTR-MCNC: 142 MG/DL (ref 70–99)
GLUCOSE BLDC GLUCOMTR-MCNC: 182 MG/DL (ref 70–99)
GLUCOSE BLDC GLUCOMTR-MCNC: 193 MG/DL (ref 70–99)
GLUCOSE BLDC GLUCOMTR-MCNC: 193 MG/DL (ref 70–99)
HOLD SPECIMEN: NORMAL
MAGNESIUM SERPL-MCNC: 2.1 MG/DL (ref 1.7–2.3)
POTASSIUM SERPL-SCNC: 3.6 MMOL/L (ref 3.4–5.3)
POTASSIUM SERPL-SCNC: 3.7 MMOL/L (ref 3.4–5.3)

## 2025-01-04 PROCEDURE — 82962 GLUCOSE BLOOD TEST: CPT

## 2025-01-04 PROCEDURE — 36415 COLL VENOUS BLD VENIPUNCTURE: CPT | Performed by: INTERNAL MEDICINE

## 2025-01-04 PROCEDURE — 84132 ASSAY OF SERUM POTASSIUM: CPT | Performed by: INTERNAL MEDICINE

## 2025-01-04 PROCEDURE — 250N000013 HC RX MED GY IP 250 OP 250 PS 637: Performed by: INTERNAL MEDICINE

## 2025-01-04 PROCEDURE — 99232 SBSQ HOSP IP/OBS MODERATE 35: CPT | Performed by: INTERNAL MEDICINE

## 2025-01-04 PROCEDURE — 250N000012 HC RX MED GY IP 250 OP 636 PS 637: Performed by: INTERNAL MEDICINE

## 2025-01-04 PROCEDURE — G0378 HOSPITAL OBSERVATION PER HR: HCPCS

## 2025-01-04 PROCEDURE — 83735 ASSAY OF MAGNESIUM: CPT | Performed by: INTERNAL MEDICINE

## 2025-01-04 PROCEDURE — 250N000013 HC RX MED GY IP 250 OP 250 PS 637: Performed by: SURGERY

## 2025-01-04 PROCEDURE — 250N000011 HC RX IP 250 OP 636: Performed by: INTERNAL MEDICINE

## 2025-01-04 PROCEDURE — 96376 TX/PRO/DX INJ SAME DRUG ADON: CPT

## 2025-01-04 RX ORDER — POTASSIUM CHLORIDE 1500 MG/1
20 TABLET, EXTENDED RELEASE ORAL ONCE
Status: COMPLETED | OUTPATIENT
Start: 2025-01-04 | End: 2025-01-04

## 2025-01-04 RX ADMIN — GABAPENTIN 600 MG: 600 TABLET, FILM COATED ORAL at 21:52

## 2025-01-04 RX ADMIN — GABAPENTIN 600 MG: 600 TABLET, FILM COATED ORAL at 14:33

## 2025-01-04 RX ADMIN — POLYETHYLENE GLYCOL 3350 17 G: 17 POWDER, FOR SOLUTION ORAL at 21:52

## 2025-01-04 RX ADMIN — SENNOSIDES AND DOCUSATE SODIUM 1 TABLET: 50; 8.6 TABLET ORAL at 21:52

## 2025-01-04 RX ADMIN — OLANZAPINE 15 MG: 15 TABLET, FILM COATED ORAL at 21:52

## 2025-01-04 RX ADMIN — INSULIN ASPART 1 UNITS: 100 INJECTION, SOLUTION INTRAVENOUS; SUBCUTANEOUS at 12:30

## 2025-01-04 RX ADMIN — MAGNESIUM HYDROXIDE 30 ML: 400 SUSPENSION ORAL at 14:33

## 2025-01-04 RX ADMIN — INSULIN GLARGINE 10 UNITS: 100 INJECTION, SOLUTION SUBCUTANEOUS at 00:15

## 2025-01-04 RX ADMIN — GABAPENTIN 600 MG: 600 TABLET, FILM COATED ORAL at 07:46

## 2025-01-04 RX ADMIN — POLYETHYLENE GLYCOL 3350 17 G: 17 POWDER, FOR SOLUTION ORAL at 07:46

## 2025-01-04 RX ADMIN — VENLAFAXINE HYDROCHLORIDE 150 MG: 150 CAPSULE, EXTENDED RELEASE ORAL at 21:52

## 2025-01-04 RX ADMIN — ENALAPRIL MALEATE 2.5 MG: 2.5 TABLET ORAL at 07:46

## 2025-01-04 RX ADMIN — TRAZODONE HYDROCHLORIDE 100 MG: 100 TABLET ORAL at 21:52

## 2025-01-04 RX ADMIN — FAMOTIDINE 20 MG: 10 INJECTION, SOLUTION INTRAVENOUS at 03:46

## 2025-01-04 RX ADMIN — FAMOTIDINE 20 MG: 10 INJECTION, SOLUTION INTRAVENOUS at 14:33

## 2025-01-04 RX ADMIN — INSULIN ASPART 2 UNITS: 100 INJECTION, SOLUTION INTRAVENOUS; SUBCUTANEOUS at 18:52

## 2025-01-04 RX ADMIN — CLOZAPINE 25 MG: 25 TABLET ORAL at 21:52

## 2025-01-04 RX ADMIN — INSULIN GLARGINE 10 UNITS: 100 INJECTION, SOLUTION SUBCUTANEOUS at 22:36

## 2025-01-04 RX ADMIN — SENNOSIDES AND DOCUSATE SODIUM 1 TABLET: 50; 8.6 TABLET ORAL at 07:46

## 2025-01-04 RX ADMIN — POTASSIUM CHLORIDE 20 MEQ: 1500 TABLET, EXTENDED RELEASE ORAL at 07:30

## 2025-01-04 ASSESSMENT — ACTIVITIES OF DAILY LIVING (ADL)
ADLS_ACUITY_SCORE: 47

## 2025-01-04 NOTE — PLAN OF CARE
PRIMARY DIAGNOSIS: ACUTE PAIN  OUTPATIENT/OBSERVATION GOALS TO BE MET BEFORE DISCHARGE:  1. Pain Status: Improved-controlled with oral pain medications.    2. Return to near baseline physical activity: Yes    3. Cleared for discharge by consultants (if involved): No    Discharge Planner Nurse   Safe discharge environment identified: No  Barriers to discharge: Yes       Entered by: Rajni Vasquez RN 01/04/2025 10:13 AM     Please review provider order for any additional goals.   Nurse to notify provider when observation goals have been met and patient is ready for discharge.Goal Outcome Evaluation:

## 2025-01-04 NOTE — PROGRESS NOTES
General surgery  Patient seems to be doing a little better.  Had been sleeping prior to my arrival.  Has had some return of bowel function.  I think we should advance her diet to low fiber but continue with aggressive bowel regimen.  If she can continue to have adequate bowel function, should be eligible for discharge tomorrow.  Kobe Rushing MD  General Surgery, Office [phone]

## 2025-01-04 NOTE — PLAN OF CARE
Goal Outcome Evaluation:      Plan of Care Reviewed With: patient, spouse    Overall Patient Progress: no changeOverall Patient Progress: no change     PRIMARY Concern: Abdominal discomfort/distention  SAFETY RISK Concerns (fall risk, behaviors, etc.): none      Aggression Tool Color: green  Isolation/Type: n/a  Tests/Procedures for NEXT shift: labs  Consults? (Pending/following, signed-off?) Gen surgery following.  Where is patient from? (Home, TCU, etc.): Home with   Other Important info for NEXT shift: BG check. Hx recent lap ape 12/22.  at bedside for overnight stay.  Anticipated DC date & active delays: TBD  ________________________________________________________________________  SUMMARY NOTE:                Orientation/Cognitive: AOX4  Observation Goals (Met/ Not Met): not met  Mobility Level/Assist Equipment: Ind  Antibiotics & Plan (IV/po, length of tx left): n/a  Pain Management: Denies.  Tele/VS/O2: VSS on RA.   ABNL Lab/BG: potassium replaced, recheck  Diet: Clears   Bowel/Bladder: Continent. BM last evening  Skin Concerns: 3 lap sites dressing CDI  Drains/Devices: PIV SL  Patient Stated Goal for Today: rest

## 2025-01-04 NOTE — PROGRESS NOTES
[facility]    Medicine Progress Note - Hospitalist Service    Date of Admission:  1/2/2025    Assessment & Plan    Monica is a 59-year-old female with a history of type 2 diabetes mellitus, hypertension, hyperlipidemia and recent laparoscopic appendectomy 12/22/2024 who was admitted to [facility] on 1/2/2025 for acute abdominal pain/distention secondary to ileus versus distal SBO.  If tolerating diet, can discharge as early as 1/5/2025    Postop ileus versus early small bowel obstruction in setting of recent laparoscopic appendectomy on 12/22/2024  - She underwent uneventful laparoscopic appendectomy on 12/22/2024 by Dr. Rocha  - Recent admission [place] 12/31/2024 - 1/1/2024 for SBO managed conservatively with general surgery  - CT AP W1/2/25: Ileus versus partial distal SBO with transition point terminal ileum  Plan  - General surgery evaluated and recommended uptitration from CLD to low fiber to regular diet  - Continue Senna-docusate BID + Miralax BID    Diabetes mellitus type 2 complicated by neuropathy  - PTA meds needs to be verified by the pharmacy but apparently on insulin glargine 46 units at bedtime, Actos and Ozempic  - Hold PTA Tresiba and Ozempic  - Blood sugars 110  - Low-dose Lantus 10 units nightly, MDSSI qAC/qHS  - Check glucose qAC/qHS  - Continue PTA gabapentin 600 mg p.o. 3 times daily    Anasarca  - Unclear etiology  - Reports history of fatty liver but no evidence of liver cirrhosis on her recent CT abdomen scans  - Does have some chronic lower extremity edema for which she takes Lasix  - No history of CHF, recent echocardiogram on 12/31/24 showed normal ejection fraction  - She is on Actos which can lead to fluid overload/CHF exacerbation; she was advised to hold Actos for now and follow-up with PCP    Hyponatremia - resolved  Hypertension - Resume PTA enalapril with holding parameters  Dyslipidemia - PTA statin given n.p.o. status  Schizophrenia - Resume PTA Zyprexa,  trazodone, Effexor and Clozaril  Bipolar disorder   Insomnia    Observation Goals: -diagnostic tests and consults completed and resulted, -vital signs normal or at patient baseline, -tolerating oral intake to maintain hydration, Nurse to notify provider when observation goals have been met and patient is ready for discharge.  Diet: Low Fiber Diet    DVT Prophylaxis: Pneumatic Compression Devices  Yi Catheter: Not present  Lines: None     Cardiac Monitoring: None  Code Status: Full Code          Disposition Plan     Medically Ready for Discharge: Anticipate tomorrow (if improved flatulence, bowel movement and abdominal pain    Dee Rocha DO  Hospitalist Service  [facility]  Securely message with [employer] (more info)  Text page via [employer] Paging/Directory   ______________________________________________________________________    Interval History   No overnight events.    On evaluation this am, is resting on the bed on her L side. Her abdominal distension and pain have improved. Tolerated the clear liquid diet this am without any issues. Able to have numerous episodes of flatulence but only 2 episodes of bowel movement    Physical Exam   Vital Signs: Temp: 97.3  F (36.3  C) Temp src: Oral BP: 135/78 Pulse: 86   Resp: 18 SpO2: 95 % O2 Device: None (Room air)    Weight: 301 lbs 6.4 oz    General Appearance:  Awake/alert, no acute distress  Respiratory: Bilateral air entry, no wheezing, no rales, no crackles  Cardiovascular: S1-S2, RRR, no murmurs, rubs  GI: Abdomen is distended, mild tender to palpation, no rebound, bowel sounds are present  Skin: No rashes, no cyanosis  Neuro: AAOx3, no focal neurological deficits    Medical Decision Making       60 MINUTES SPENT BY ME on the date of service doing chart review, history, exam, documentation & further activities per the note.      Data   ------------------------- PAST 24 HR DATA REVIEWED -----------------------------------------------

## 2025-01-04 NOTE — PLAN OF CARE
PRIMARY DIAGNOSIS: ACUTE PAIN  OUTPATIENT/OBSERVATION GOALS TO BE MET BEFORE DISCHARGE:  1. Pain Status: Improved-controlled with oral pain medications.    2. Return to near baseline physical activity: Yes    3. Cleared for discharge by consultants (if involved): No    Discharge Planner Nurse   Safe discharge environment identified: No  Barriers to discharge: Yes       Entered by: Rajni Vasquez RN 01/04/2025 12:40 PM     Please review provider order for any additional goals.   Nurse to notify provider when observation goals have been met and patient is ready for discharge.Goal Outcome Evaluation:

## 2025-01-04 NOTE — PLAN OF CARE
Goal Outcome Evaluation:      Plan of Care Reviewed With: patient    Top portion must ALWAYS be completed  PRIMARY Concern: Abdominal discomfort/distention  SAFETY RISK Concerns (fall risk, behaviors, etc.): none      Aggression Tool Color: green  Isolation/Type: n/a  Tests/Procedures for NEXT shift: K+ recheck at 0049.  Consults? (Pending/following, signed-off?) Gen surgery following.  Where is patient from? (Home, TCU, etc.): Home with   Other Important info for NEXT shift: BG check. Hx recent lap ape 12/22.  at bedside for overnight stay.  Anticipated DC date & active delays: TBD  ________________________________________________________________________  SUMMARY NOTE:                Orientation/Cognitive: AOX4  Observation Goals (Met/ Not Met): not met  Mobility Level/Assist Equipment: Ind  Antibiotics & Plan (IV/po, length of tx left): n/a  Pain Management: Minima abdominal discomfort. Denied pain meds.  Tele/VS/O2: VSS on RA.   ABNL Lab/BG: BG- 138, 173. K+ 3.3 replaced and recheck at 0049.  Diet: Clears. Denies Nausea/vomiting.  Bowel/Bladder: Continent. BM this shift  Skin Concerns: 3 old lap sites dressing CDI  Drains/Devices: PIV SL  Patient Stated Goal for Today: rest

## 2025-01-04 NOTE — PROGRESS NOTES
Observation goals  PRIOR TO DISCHARGE       Comments: -diagnostic tests and consults completed and resulted- not met  -vital signs normal or at patient baseline- met  -tolerating oral intake to maintain hydration- met  Nurse to notify provider when observation goals have been met and patient is ready for discharge.

## 2025-01-05 LAB
GLUCOSE BLDC GLUCOMTR-MCNC: 146 MG/DL (ref 70–99)
GLUCOSE BLDC GLUCOMTR-MCNC: 164 MG/DL (ref 70–99)
GLUCOSE BLDC GLUCOMTR-MCNC: 182 MG/DL (ref 70–99)
GLUCOSE BLDC GLUCOMTR-MCNC: 197 MG/DL (ref 70–99)
GLUCOSE BLDC GLUCOMTR-MCNC: 199 MG/DL (ref 70–99)
POTASSIUM SERPL-SCNC: 3.6 MMOL/L (ref 3.4–5.3)

## 2025-01-05 PROCEDURE — 99232 SBSQ HOSP IP/OBS MODERATE 35: CPT | Performed by: INTERNAL MEDICINE

## 2025-01-05 PROCEDURE — 84132 ASSAY OF SERUM POTASSIUM: CPT | Performed by: INTERNAL MEDICINE

## 2025-01-05 PROCEDURE — 82962 GLUCOSE BLOOD TEST: CPT

## 2025-01-05 PROCEDURE — G0378 HOSPITAL OBSERVATION PER HR: HCPCS

## 2025-01-05 PROCEDURE — 120N000001 HC R&B MED SURG/OB

## 2025-01-05 PROCEDURE — 96376 TX/PRO/DX INJ SAME DRUG ADON: CPT

## 2025-01-05 PROCEDURE — 250N000011 HC RX IP 250 OP 636: Performed by: INTERNAL MEDICINE

## 2025-01-05 PROCEDURE — 250N000013 HC RX MED GY IP 250 OP 250 PS 637: Performed by: SURGERY

## 2025-01-05 PROCEDURE — 96375 TX/PRO/DX INJ NEW DRUG ADDON: CPT

## 2025-01-05 PROCEDURE — 36415 COLL VENOUS BLD VENIPUNCTURE: CPT | Performed by: INTERNAL MEDICINE

## 2025-01-05 PROCEDURE — 250N000013 HC RX MED GY IP 250 OP 250 PS 637: Performed by: INTERNAL MEDICINE

## 2025-01-05 RX ORDER — POTASSIUM CHLORIDE 1500 MG/1
20 TABLET, EXTENDED RELEASE ORAL ONCE
Status: COMPLETED | OUTPATIENT
Start: 2025-01-05 | End: 2025-01-05

## 2025-01-05 RX ORDER — FERROUS SULFATE 325(65) MG
325 TABLET ORAL EVERY OTHER DAY
Qty: 30 TABLET | Refills: 1 | Status: CANCELLED | OUTPATIENT
Start: 2025-01-05

## 2025-01-05 RX ORDER — METOCLOPRAMIDE HYDROCHLORIDE 5 MG/ML
10 INJECTION INTRAMUSCULAR; INTRAVENOUS ONCE
Status: COMPLETED | OUTPATIENT
Start: 2025-01-06 | End: 2025-01-06

## 2025-01-05 RX ORDER — POLYETHYLENE GLYCOL 3350 17 G/17G
17 POWDER, FOR SOLUTION ORAL 2 TIMES DAILY
Qty: 510 G | Refills: 0 | Status: CANCELLED | OUTPATIENT
Start: 2025-01-05

## 2025-01-05 RX ORDER — AMOXICILLIN 250 MG
1 CAPSULE ORAL 2 TIMES DAILY
Qty: 30 TABLET | Refills: 0 | Status: CANCELLED | OUTPATIENT
Start: 2025-01-05

## 2025-01-05 RX ADMIN — ENALAPRIL MALEATE 2.5 MG: 2.5 TABLET ORAL at 08:02

## 2025-01-05 RX ADMIN — FAMOTIDINE 20 MG: 10 INJECTION, SOLUTION INTRAVENOUS at 03:22

## 2025-01-05 RX ADMIN — INSULIN ASPART 1 UNITS: 100 INJECTION, SOLUTION INTRAVENOUS; SUBCUTANEOUS at 12:39

## 2025-01-05 RX ADMIN — ONDANSETRON 4 MG: 2 INJECTION, SOLUTION INTRAMUSCULAR; INTRAVENOUS at 20:28

## 2025-01-05 RX ADMIN — ONDANSETRON 4 MG: 2 INJECTION, SOLUTION INTRAMUSCULAR; INTRAVENOUS at 14:55

## 2025-01-05 RX ADMIN — TRAZODONE HYDROCHLORIDE 100 MG: 100 TABLET ORAL at 22:10

## 2025-01-05 RX ADMIN — SENNOSIDES AND DOCUSATE SODIUM 1 TABLET: 50; 8.6 TABLET ORAL at 19:03

## 2025-01-05 RX ADMIN — ACETAMINOPHEN 650 MG: 325 TABLET, FILM COATED ORAL at 14:55

## 2025-01-05 RX ADMIN — ONDANSETRON 4 MG: 2 INJECTION, SOLUTION INTRAMUSCULAR; INTRAVENOUS at 07:53

## 2025-01-05 RX ADMIN — INSULIN ASPART 2 UNITS: 100 INJECTION, SOLUTION INTRAVENOUS; SUBCUTANEOUS at 18:20

## 2025-01-05 RX ADMIN — POLYETHYLENE GLYCOL 3350 17 G: 17 POWDER, FOR SOLUTION ORAL at 08:02

## 2025-01-05 RX ADMIN — PROCHLORPERAZINE EDISYLATE 10 MG: 5 INJECTION INTRAMUSCULAR; INTRAVENOUS at 19:08

## 2025-01-05 RX ADMIN — INSULIN ASPART 1 UNITS: 100 INJECTION, SOLUTION INTRAVENOUS; SUBCUTANEOUS at 09:10

## 2025-01-05 RX ADMIN — CLOZAPINE 25 MG: 25 TABLET ORAL at 22:09

## 2025-01-05 RX ADMIN — MAGNESIUM HYDROXIDE 30 ML: 400 SUSPENSION ORAL at 08:02

## 2025-01-05 RX ADMIN — GABAPENTIN 600 MG: 600 TABLET, FILM COATED ORAL at 08:02

## 2025-01-05 RX ADMIN — POLYETHYLENE GLYCOL 3350 17 G: 17 POWDER, FOR SOLUTION ORAL at 19:03

## 2025-01-05 RX ADMIN — VENLAFAXINE HYDROCHLORIDE 150 MG: 150 CAPSULE, EXTENDED RELEASE ORAL at 22:10

## 2025-01-05 RX ADMIN — INSULIN GLARGINE 10 UNITS: 100 INJECTION, SOLUTION SUBCUTANEOUS at 22:09

## 2025-01-05 RX ADMIN — ACETAMINOPHEN 650 MG: 325 TABLET, FILM COATED ORAL at 19:03

## 2025-01-05 RX ADMIN — GABAPENTIN 600 MG: 600 TABLET, FILM COATED ORAL at 14:26

## 2025-01-05 RX ADMIN — GABAPENTIN 600 MG: 600 TABLET, FILM COATED ORAL at 19:03

## 2025-01-05 RX ADMIN — OLANZAPINE 15 MG: 15 TABLET, FILM COATED ORAL at 22:10

## 2025-01-05 RX ADMIN — FAMOTIDINE 20 MG: 10 INJECTION, SOLUTION INTRAVENOUS at 14:55

## 2025-01-05 RX ADMIN — POTASSIUM CHLORIDE 20 MEQ: 1500 TABLET, EXTENDED RELEASE ORAL at 14:26

## 2025-01-05 RX ADMIN — SENNOSIDES AND DOCUSATE SODIUM 1 TABLET: 50; 8.6 TABLET ORAL at 08:02

## 2025-01-05 ASSESSMENT — ACTIVITIES OF DAILY LIVING (ADL)
ADLS_ACUITY_SCORE: 47

## 2025-01-05 NOTE — PROVIDER NOTIFICATION
Observation goals  PRIOR TO DISCHARGE       Comments: -diagnostic tests and consults completed and resulted- not met  -vital signs normal or at patient baseline- met  -tolerating oral intake to maintain hydration- partially met  Nurse to notify provider when observation goals have been met and patient is ready for discharge.

## 2025-01-05 NOTE — PLAN OF CARE
Goal Outcome Evaluation:      Plan of Care Reviewed With: patient, spouse    Overall Patient Progress: no changeOverall Patient Progress: no change     PRIMARY Concern: Abdominal discomfort/distention  SAFETY RISK Concerns (fall risk, behaviors, etc.): none      Aggression Tool Color: green  Isolation/Type: n/a  Tests/Procedures for NEXT shift: labs  Consults? (Pending/following, signed-off?) Gen surgery following.  Where is patient from? (Home, TCU, etc.): Home with   Other Important info for NEXT shift: BG check. Hx recent lap appi 12/22, 3 lap sites WNL. Distended. Having frequent BM's and passing gas. Aggressive bowel regimen in place.   Anticipated DC date & active delays: TBD  ________________________________________________________________________  SUMMARY NOTE:                Orientation/Cognitive: AOX4  Observation Goals (Met/ Not Met): not met  Mobility Level/Assist Equipment: Ind, frequent ambulation in hallways  Antibiotics & Plan (IV/po, length of tx left): n/a  Pain Management: c/o some abdominal discomfort, declined intervention.   Tele/VS/O2: VSS on RA.   ABNL Lab/BG: potassium replaced 1/4, Plan to recheck in AM   Diet: Advanced to regular diet for AM, however patient did not tolerate low fiber diet and would like to go to full liquid diet  Bowel/Bladder: Continent. Having BM's and passing flatus   Skin Concerns: 3 lap sites dressing CDI  Drains/Devices: PIV SL  Patient Stated Goal for Today: feel better

## 2025-01-05 NOTE — PROGRESS NOTES
[facility]    Medicine Progress Note - Hospitalist Service    Date of Admission:  1/2/2025    Assessment & Plan    Monica is a 59-year-old female with a history of type 2 diabetes mellitus, hypertension, hyperlipidemia and recent laparoscopic appendectomy 12/22/2024 who was admitted to [facility] on 1/2/2025 for acute abdominal pain/distention secondary to ileus versus distal SBO.  If tolerating regular diet, can discharge as early as 1/6/2025    Postop ileus versus early small bowel obstruction in setting of recent laparoscopic appendectomy on 12/22/2024  - CT AP W1/2/25: Ileus versus partial distal SBO with transition point terminal ileum  Plan  - General surgery following, she will follow up with Dr Juarez maddox on discharge (Discussed with Dr Rushing on 1/5/2025 who will contact their clinic scheduler)  - Continue Senna-docusate BID + Miralax BID. Give enema x1 today  - Encourage ambulation as much as possible  - Continue regular diet    Diabetes mellitus type 2 complicated by neuropathy  - PTA meds needs to be verified by the pharmacy but apparently on insulin glargine 46 units at bedtime, Actos and Ozempic  - Hold PTA Tresiba and Ozempic  - Low-dose Lantus 10 units nightly, MDSSI qAC/qHS  - Check glucose qAC/qHS  - Continue PTA gabapentin 600 mg p.o. 3 times daily    Anasarca  - Unclear etiology  - Reports history of fatty liver but no evidence of liver cirrhosis on her recent CT abdomen scans  - Does have some chronic lower extremity edema for which she takes Lasix  - No history of CHF, recent echocardiogram on 12/31/24 showed normal ejection fraction  - She is on Actos which can lead to fluid overload/CHF exacerbation; she was advised to hold Actos for now and follow-up with PCP    Hyponatremia - resolved  Hypertension - Resume PTA enalapril with holding parameters  Dyslipidemia - PTA statin given n.p.o. status  Schizophrenia - Resume PTA Zyprexa, trazodone, Effexor and  Clozaril  Bipolar disorder   Insomnia    Diet: Advance Diet as Tolerated: Regular Diet Adult; Regular Diet Adult    DVT Prophylaxis: Pneumatic Compression Devices  Yi Catheter: Not present  Lines: None     Cardiac Monitoring: None  Code Status: Full Code      Disposition Plan     Medically Ready for Discharge: Anticipate tomorrow if tolerating regular diet    Dee Rocha DO  Hospitalist Service  [facility]  Securely message with [employer] (more info)  Text page via [employer] Paging/Directory   ______________________________________________________________________    Interval History   No overnight events.    On evaluation this am, is resting on the bed on her L side and accompanied by  (Tony).  States she was able to pass gas all of last night and this morning.  Last time she had a bowel movement was yesterday evening, but since being awoken at 0230, has not had a bowel movement yet.  Still feels her abdomen feels bloated.  Ate a chicken egg sandwich last night with some more discomfort.    Physical Exam   Vital Signs: Temp: 97.6  F (36.4  C) Temp src: Oral BP: 137/75 Pulse: 84   Resp: 18 SpO2: 95 % O2 Device: None (Room air)    Weight: 301 lbs 6.4 oz    General Appearance:  Awake/alert, no acute distress  Respiratory: Bilateral air entry, no wheezing, no rales, no crackles  Cardiovascular: S1-S2, RRR, no murmurs, rubs  GI: Abdomen is distended, mild tender to palpation, no rebound, bowel sounds are present  Skin: No rashes, no cyanosis  Neuro: AAOx3, no focal neurological deficits    Medical Decision Making       60 MINUTES SPENT BY ME on the date of service doing chart review, history, exam, documentation & further activities per the note.      Data   ------------------------- PAST 24 HR DATA REVIEWED -----------------------------------------------

## 2025-01-05 NOTE — PLAN OF CARE
PRIMARY DIAGNOSIS: ACUTE PAIN  OUTPATIENT/OBSERVATION GOALS TO BE MET BEFORE DISCHARGE:  1. Pain Status: Improved-controlled with oral pain medications.    2. Return to near baseline physical activity: Yes    3. Cleared for discharge by consultants (if involved): No    Discharge Planner Nurse   Safe discharge environment identified: No  Barriers to discharge: Yes       Entered by: Rajni Vasquez RN 01/04/2025 6:25 PM     Please review provider order for any additional goals.   Nurse to notify provider when observation goals have been met and patient is ready for discharge.Goal Outcome Evaluation:

## 2025-01-05 NOTE — PLAN OF CARE
PRIMARY Concern: Abdominal discomfort/distention  SAFETY RISK Concerns (fall risk, behaviors, etc.): none      Aggression Tool Color: green  Isolation/Type: n/a  Tests/Procedures for NEXT shift: labs  Consults? (Pending/following, signed-off?) Gen surgery following.  Where is patient from? (Home, TCU, etc.): Home with   Other Important info for NEXT shift: BG check. Hx recent lap appi 12/22, 3 lap sites WDL. Distended. Having frequent BM's and passing gas. Aggressive bowel regimen in place.   Anticipated DC date & active delays: TBD  ________________________________________________________________________  SUMMARY NOTE:                Orientation/Cognitive: AOX4  Observation Goals (Met/ Not Met): not met  Mobility Level/Assist Equipment: Ind  Antibiotics & Plan (IV/po, length of tx left): n/a  Pain Management: c/o some abdominal discomfort, declined intervention  Tele/VS/O2: VSS on RA.   ABNL Lab/BG: potassium replaced, K+ 3.7 . Plan to recheck in AM   Diet: Advanced to regular diet for AM   Bowel/Bladder: Continent. Having BM's and passing flatus   Skin Concerns: 3 lap sites dressing CDI  Drains/Devices: PIV SL  Patient Stated Goal for Today: rest

## 2025-01-05 NOTE — PROGRESS NOTES
Surgery  Patient looks to be doing better.  Has been having frequent loose bowel movements.  Less abdominal distention, although it is somewhat difficult to tell given her body habitus.  Still complains of occasional nausea, requiring Zofran.  I have advanced her from low fiber to a regular diet.  I think she should continue to try to ambulate.  If she continues to improve, could be a candidate for discharge later today versus tomorrow.  Kobe Rushing MD  General Surgery, Office [phone]

## 2025-01-05 NOTE — PLAN OF CARE
Goal Outcome Evaluation:    PRIMARY Concern: Abdominal discomfort/distention  SAFETY RISK Concerns (fall risk, behaviors, etc.): none      Aggression Tool Color: green  Isolation/Type: n/a  Tests/Procedures for NEXT shift: None scheduled   Consults? (Pending/following, signed-off?) Gen surgery following.  Where is patient from? (Home, TCU, etc.): Home with   Other Important info for NEXT shift: BG checks. Hx recent lap appi 12/22, 3 lap sites WNL. Distended. Having frequent BM's and passing gas. Aggressive bowel regimen in place.   Anticipated DC date & active delays: TBD  ________________________________________________________________________  SUMMARY NOTE:                Orientation/Cognitive: AOX4  Observation Goals (Met/ Not Met): not met  Mobility Level/Assist Equipment: Ind, frequent ambulation in hallways  Antibiotics & Plan (IV/po, length of tx left): n/a  Pain Management: c/o some abdominal discomfort, declined intervention. Zofran given x1  Tele/VS/O2: VSS on RA.   ABNL Lab/BG: potassium replaced, recheck in AM   Diet: Low fiber, tolerating   Bowel/Bladder: Continent. Having BM's and passing flatus   Skin Concerns: 3 lap sites dressing CDI  Drains/Devices: PIV SL  Patient Stated Goal for Today: Rest

## 2025-01-06 ENCOUNTER — APPOINTMENT (OUTPATIENT)
Dept: CT IMAGING | Facility: CLINIC | Age: 60
DRG: 389 | End: 2025-01-06
Attending: INTERNAL MEDICINE
Payer: MEDICARE

## 2025-01-06 ENCOUNTER — APPOINTMENT (OUTPATIENT)
Dept: ULTRASOUND IMAGING | Facility: CLINIC | Age: 60
DRG: 389 | End: 2025-01-06
Attending: INTERNAL MEDICINE
Payer: MEDICARE

## 2025-01-06 LAB
ALBUMIN SERPL BCG-MCNC: 3.8 G/DL (ref 3.5–5.2)
ALP SERPL-CCNC: 107 U/L (ref 40–150)
ALT SERPL W P-5'-P-CCNC: 34 U/L (ref 0–50)
ANION GAP SERPL CALCULATED.3IONS-SCNC: 12 MMOL/L (ref 7–15)
AST SERPL W P-5'-P-CCNC: 23 U/L (ref 0–45)
BILIRUB DIRECT SERPL-MCNC: <0.2 MG/DL (ref 0–0.3)
BILIRUB SERPL-MCNC: 0.3 MG/DL
BUN SERPL-MCNC: 7.6 MG/DL (ref 6–20)
CALCIUM SERPL-MCNC: 9 MG/DL (ref 8.8–10.4)
CHLORIDE SERPL-SCNC: 92 MMOL/L (ref 98–107)
CREAT SERPL-MCNC: 0.61 MG/DL (ref 0.51–0.95)
EGFRCR SERPLBLD CKD-EPI 2021: >90 ML/MIN/1.73M2
ERYTHROCYTE [DISTWIDTH] IN BLOOD BY AUTOMATED COUNT: 13.1 % (ref 10–15)
GLUCOSE BLDC GLUCOMTR-MCNC: 154 MG/DL (ref 70–99)
GLUCOSE BLDC GLUCOMTR-MCNC: 185 MG/DL (ref 70–99)
GLUCOSE BLDC GLUCOMTR-MCNC: 185 MG/DL (ref 70–99)
GLUCOSE BLDC GLUCOMTR-MCNC: 194 MG/DL (ref 70–99)
GLUCOSE SERPL-MCNC: 196 MG/DL (ref 70–99)
HCO3 SERPL-SCNC: 27 MMOL/L (ref 22–29)
HCT VFR BLD AUTO: 41.7 % (ref 35–47)
HGB BLD-MCNC: 13.2 G/DL (ref 11.7–15.7)
MCH RBC QN AUTO: 27.2 PG (ref 26.5–33)
MCHC RBC AUTO-ENTMCNC: 31.7 G/DL (ref 31.5–36.5)
MCV RBC AUTO: 86 FL (ref 78–100)
PLATELET # BLD AUTO: 487 10E3/UL (ref 150–450)
POTASSIUM SERPL-SCNC: 3.7 MMOL/L (ref 3.4–5.3)
PROT SERPL-MCNC: 7.1 G/DL (ref 6.4–8.3)
RBC # BLD AUTO: 4.85 10E6/UL (ref 3.8–5.2)
SODIUM SERPL-SCNC: 131 MMOL/L (ref 135–145)
WBC # BLD AUTO: 18.5 10E3/UL (ref 4–11)

## 2025-01-06 PROCEDURE — 250N000013 HC RX MED GY IP 250 OP 250 PS 637: Performed by: INTERNAL MEDICINE

## 2025-01-06 PROCEDURE — 250N000011 HC RX IP 250 OP 636: Performed by: INTERNAL MEDICINE

## 2025-01-06 PROCEDURE — 99233 SBSQ HOSP IP/OBS HIGH 50: CPT | Performed by: INTERNAL MEDICINE

## 2025-01-06 PROCEDURE — 36415 COLL VENOUS BLD VENIPUNCTURE: CPT | Performed by: INTERNAL MEDICINE

## 2025-01-06 PROCEDURE — 250N000013 HC RX MED GY IP 250 OP 250 PS 637: Performed by: SURGERY

## 2025-01-06 PROCEDURE — 250N000009 HC RX 250: Performed by: INTERNAL MEDICINE

## 2025-01-06 PROCEDURE — 74177 CT ABD & PELVIS W/CONTRAST: CPT

## 2025-01-06 PROCEDURE — 99207 PR APP CREDIT; MD BILLING SHARED VISIT: CPT | Performed by: PHYSICIAN ASSISTANT

## 2025-01-06 PROCEDURE — 76705 ECHO EXAM OF ABDOMEN: CPT

## 2025-01-06 PROCEDURE — 258N000001 HC RX 258: Performed by: INTERNAL MEDICINE

## 2025-01-06 PROCEDURE — 82248 BILIRUBIN DIRECT: CPT | Performed by: INTERNAL MEDICINE

## 2025-01-06 PROCEDURE — 85027 COMPLETE CBC AUTOMATED: CPT | Performed by: INTERNAL MEDICINE

## 2025-01-06 PROCEDURE — 80053 COMPREHEN METABOLIC PANEL: CPT | Performed by: INTERNAL MEDICINE

## 2025-01-06 PROCEDURE — 120N000001 HC R&B MED SURG/OB

## 2025-01-06 RX ORDER — DEXTROSE MONOHYDRATE, SODIUM CHLORIDE, AND POTASSIUM CHLORIDE 50; 1.49; 9 G/1000ML; G/1000ML; G/1000ML
INJECTION, SOLUTION INTRAVENOUS CONTINUOUS
Status: DISCONTINUED | OUTPATIENT
Start: 2025-01-06 | End: 2025-01-08

## 2025-01-06 RX ORDER — FUROSEMIDE 20 MG/1
20 TABLET ORAL
Status: DISCONTINUED | OUTPATIENT
Start: 2025-01-06 | End: 2025-01-10 | Stop reason: HOSPADM

## 2025-01-06 RX ORDER — SIMETHICONE 80 MG
80-160 TABLET,CHEWABLE ORAL EVERY 6 HOURS PRN
Status: DISCONTINUED | OUTPATIENT
Start: 2025-01-06 | End: 2025-01-10 | Stop reason: HOSPADM

## 2025-01-06 RX ORDER — IOPAMIDOL 755 MG/ML
135 INJECTION, SOLUTION INTRAVASCULAR ONCE
Status: COMPLETED | OUTPATIENT
Start: 2025-01-06 | End: 2025-01-06

## 2025-01-06 RX ORDER — HYDROMORPHONE HYDROCHLORIDE 1 MG/ML
0.3 INJECTION, SOLUTION INTRAMUSCULAR; INTRAVENOUS; SUBCUTANEOUS
Status: DISCONTINUED | OUTPATIENT
Start: 2025-01-06 | End: 2025-01-10 | Stop reason: HOSPADM

## 2025-01-06 RX ORDER — DIATRIZOATE MEGLUMINE AND DIATRIZOATE SODIUM 660; 100 MG/ML; MG/ML
90 SOLUTION ORAL; RECTAL ONCE
Status: COMPLETED | OUTPATIENT
Start: 2025-01-07 | End: 2025-01-07

## 2025-01-06 RX ADMIN — Medication 1 ML: at 21:20

## 2025-01-06 RX ADMIN — OLANZAPINE 15 MG: 15 TABLET, FILM COATED ORAL at 21:25

## 2025-01-06 RX ADMIN — TRAZODONE HYDROCHLORIDE 100 MG: 100 TABLET ORAL at 21:25

## 2025-01-06 RX ADMIN — POLYETHYLENE GLYCOL 3350 17 G: 17 POWDER, FOR SOLUTION ORAL at 08:12

## 2025-01-06 RX ADMIN — PROCHLORPERAZINE EDISYLATE 10 MG: 5 INJECTION INTRAMUSCULAR; INTRAVENOUS at 15:51

## 2025-01-06 RX ADMIN — HYDROMORPHONE HYDROCHLORIDE 0.3 MG: 1 INJECTION, SOLUTION INTRAMUSCULAR; INTRAVENOUS; SUBCUTANEOUS at 01:52

## 2025-01-06 RX ADMIN — PROCHLORPERAZINE EDISYLATE 10 MG: 5 INJECTION INTRAMUSCULAR; INTRAVENOUS at 04:44

## 2025-01-06 RX ADMIN — SODIUM CHLORIDE 76 ML: 9 INJECTION, SOLUTION INTRAVENOUS at 01:31

## 2025-01-06 RX ADMIN — ONDANSETRON 4 MG: 2 INJECTION, SOLUTION INTRAMUSCULAR; INTRAVENOUS at 02:02

## 2025-01-06 RX ADMIN — SENNOSIDES AND DOCUSATE SODIUM 1 TABLET: 50; 8.6 TABLET ORAL at 08:12

## 2025-01-06 RX ADMIN — CLOZAPINE 25 MG: 25 TABLET ORAL at 21:25

## 2025-01-06 RX ADMIN — MAGNESIUM HYDROXIDE 30 ML: 400 SUSPENSION ORAL at 08:12

## 2025-01-06 RX ADMIN — FAMOTIDINE 20 MG: 10 INJECTION, SOLUTION INTRAVENOUS at 16:01

## 2025-01-06 RX ADMIN — ONDANSETRON 4 MG: 2 INJECTION, SOLUTION INTRAMUSCULAR; INTRAVENOUS at 19:47

## 2025-01-06 RX ADMIN — ENALAPRIL MALEATE 2.5 MG: 2.5 TABLET ORAL at 08:12

## 2025-01-06 RX ADMIN — FAMOTIDINE 20 MG: 10 INJECTION, SOLUTION INTRAVENOUS at 02:02

## 2025-01-06 RX ADMIN — METOCLOPRAMIDE 10 MG: 5 INJECTION, SOLUTION INTRAMUSCULAR; INTRAVENOUS at 00:08

## 2025-01-06 RX ADMIN — IOPAMIDOL 135 ML: 755 INJECTION, SOLUTION INTRAVENOUS at 01:31

## 2025-01-06 RX ADMIN — POTASSIUM CHLORIDE, DEXTROSE MONOHYDRATE AND SODIUM CHLORIDE: 150; 5; 900 INJECTION, SOLUTION INTRAVENOUS at 18:18

## 2025-01-06 RX ADMIN — PROCHLORPERAZINE EDISYLATE 10 MG: 5 INJECTION INTRAMUSCULAR; INTRAVENOUS at 22:04

## 2025-01-06 RX ADMIN — ONDANSETRON 4 MG: 2 INJECTION, SOLUTION INTRAMUSCULAR; INTRAVENOUS at 12:11

## 2025-01-06 ASSESSMENT — ACTIVITIES OF DAILY LIVING (ADL)
ADLS_ACUITY_SCORE: 47

## 2025-01-06 NOTE — PROGRESS NOTES
"Surgery    Reports ongoing abdominal pain, pressure, distension, nausea.  Bowel movement yesterday  Tolerating NG tube  Non-mobile today  Denies CP, dyspnea  Notes that she has coughed up some phlegm.    Gen:  Awake, Alert, NAD  BP (!) 159/83 (BP Location: Right arm)   Pulse 99   Temp 97.7  F (36.5  C) (Oral)   Resp 18   Ht 1.651 m (5' 5\")   Wt 136.7 kg (301 lb 6.4 oz)   SpO2 96%   BMI 50.16 kg/m    Resp - Non-labored  Abdomen - rounded, obese. Mildly tender with palpation, somewhat firm. Incisions healing appropriately   Extremities - no lower extremity edema or tenderness with palpation    Wbc 18.5  LFT's normal  NG output 1400cc, bilious.    A/P 59 year old patient underwent laparoscopic appendectomy 12/22, readmitted to [facility] on 12/30/24 where gastrografin study was performed showed transition through the colon. She was discharged but presented to [facility] urgency room for ongoing abdominal pain and subsequently transferred to [facility] for further evaluation. She showed steady improvement until last evening when she developed increasing abdominal distension, bloating, pain and nausea.  Nasogastric tube was placed.  CT scan was obtained overnight and demonstrated distended bowel consistent with ileus as well as gall stones and gall bladder wall edema. Abdominal ultrasound confirmed presence of stones and gall bladder wall measuring 5mm without evidence of cholecystitis or ductal dilatation. LFT's normal.    - npo  - continue NG tube to LIS  - antiemetics  - encourage incentive spirometer use (patient reluctant)  - ambulate  - leukocytosis, recheck labs tomorrow.  - continue to monitor closely.   - unclear etiology of abrupt symptoms with nonspecific gall bladder wall thickening   - consider HIDA scan tomorrow if no improvement  - also consider repeating gastrografin follow through    Darinel Ibarra PA-C  Office: [phone]  Pager: [phone]    "

## 2025-01-06 NOTE — PROVIDER NOTIFICATION
MD Notification    Notified Person: MD    Notified Person Name:   Ben    Notification Date/Time: 01/06/2025, 0500    Notification Interaction: Talked with MD    Purpose of Notification: Pt continues to c/o abd distension, nausea and pain 7/10 and unable to pass gas. Dilauded was given with minimal improvement but made nausea worse. Compazine given. Any ideas?    Orders Received: NGT ordered    Comments:

## 2025-01-06 NOTE — PROGRESS NOTES
"Close coverage note\" paged by RN because the patient reports increasing abdominal distention and bloating; patient admitted with postop ileus versus early small bowel obstruction; diet was advanced today; CT abdomen pelvis ordered, keep the patient n.p.o. for now.    Addendum: CT abdomen pelvis-  1.  Mild diffuse gas and fluid distention of the small and large bowel consistent with ileus.  2.  New gallbladder wall edema.    -Ordered US abdomen, CBC, LFT, BMP in a.m.; continue n.p.o. status; surgery following    Addendum 5:05 AM-paged again by the nurse because patient continues to complain of abdominal distention nausea and abdominal pain; not passing gas as per RN; NGT to LIS ordered    Joanna Gomesist   "

## 2025-01-06 NOTE — PROVIDER NOTIFICATION
MD Notification    Notified Person: MD    Notified Person Name: Dr Belgica Contreras    Notification Date/Time: 1/6/2025, 0100    Notification Interaction: Talked with MD    Purpose of Notification: Pt c/o increasing abd distention, tender to palpation, bloating and pain now 8/10. Nausea only minimally effective after reglan. Do you want to do any scans? Meds? Something else? thanks    Orders Received: CT of abd and pelvis and keep NPO.    Comments:

## 2025-01-06 NOTE — PROGRESS NOTES
"[facility]    Medicine Progress Note - Hospitalist Service    Date of Admission:  1/2/2025    Assessment & Plan    Monica is a 59-year-old female with a history of type 2 diabetes mellitus, hypertension, hyperlipidemia and recent laparoscopic appendectomy 12/22/2024 who was admitted to [facility] on 1/2/2025 for acute abdominal pain/distention secondary to ileus versus distal SBO.  If tolerating regular diet, can discharge as early as 1/6/2025    Postop ileus versus early small bowel obstruction in setting of recent laparoscopic appendectomy on 12/22/2024  CT AP W1/2/25: Ileus versus partial distal SBO with transition point terminal ileum  General surgery consult requested, I appreciate Darinel Ibarra and Dr. Styles's follow up  Also appreciate Dr. Contreras's care, repeat CT scan of the abdomen pelvis 1/6 shows, \"mild diffuse gas and fluid distention of the small and large bowel consistent with ileus.\"  she also ordered abdominal ultrasound labs, n.p.o.  She continues, \"paged again by the nurse because patient continues to complain of abdominal distention nausea and abdominal pain; not passing gas as per RN; NGT to LIS ordered.\"  Per general surgery, continue NG to LIS  \"Ambulate, she likely has this condition from lack of moving around.   Gallbladder thickening likely not clinically significant.  Taking it out laparoscopically would not be technically feasible anyway right now given the amount of colonic distention she has.    Will do gastrografin tomorrow with enemas.\"  Add IVF while NPO, watching for signs or symptoms of fluid overload  Follow CBC    Diabetes mellitus type 2 complicated by neuropathy  PTA insulin glargine 46 units at bedtime, Ozempic  Hold PTA Tresiba and Ozempic  Low-dose Lantus 10 units nightly  Medium scale corrective dose  Resume PTA gabapentin 600 mg p.o. 3 times daily when has oral intake    Anasarca  Unclear etiology  Reports history of fatty liver but no evidence of " "liver cirrhosis on her recent CT abdomen scans  Does have some chronic lower extremity edema for which she takes Lasix  No history of CHF, recent echocardiogram on 12/31/24 showed normal ejection fraction  she was advised to hold Actos for now and follow-up with PCP  As above, begin IVF while she is n.p.o. and has significant NG output    Hyponatremia - resolved  Hypertension - Resume PTA enalapril when taking oral  Dyslipidemia - hold PTA statin given n.p.o. status  Schizophrenia - Resume PTA Zyprexa, trazodone, Effexor and Clozaril when taking oral  Bipolar disorder   Insomnia    Diet: NPO for Medical/Clinical Reasons Except for: Meds  Diet    DVT Prophylaxis: Pneumatic Compression Devices  Yi Catheter: Not present  Lines: None     Cardiac Monitoring: None  Code Status: Full Code      Disposition Plan     Medically Ready for Discharge: Anticipated discharge in 2 to 4 days, when ileus has improved and patient is tolerating oral intake    Maggy Duron MD  Hospitalist Service  [facility]  Securely message with [employer] (more info)  Text page via [employer] Paging/Directory   ______________________________________________________________________    Interval History   \"Do you know how long this tube has to be in?  The tube really hurts my throat.\"  Patient reports throat discomfort from NG tube.  She says she passed some gas yesterday, none today.  Her abdomen feels uncomfortable.  She has no respiratory complaints.    Physical Exam   Vital Signs: Temp: 98.5  F (36.9  C) Temp src: Axillary BP: (!) 158/84 Pulse: 105   Resp: 18 SpO2: 97 % O2 Device: None (Room air)    Weight: 301 lbs 6.4 oz    Constitutional: Awake, alert  Respiratory: Clear to auscultation bilaterally, no crackles or wheezing  Cardiovascular: Tachycardic, regular rate and rhythm  GI: Abdomen is obese, very distended with rare bowel sounds, slightly tender throughout with no rebound or guarding  Skin/Integumen: No rash on " exposed skin.  Modest bilateral lower extremity edema, patient says this is chronic  Other: Mood is pleasant, she understandably seems frustrated      Medical Decision Making       50 MINUTES SPENT BY ME on the date of service doing chart review, history, exam, documentation & further activities per the note.   Including discussion with patient,  bedside RN,    Data   ------------------------- PAST 24 HR DATA REVIEWED -----------------------------------------------

## 2025-01-06 NOTE — PROVIDER NOTIFICATION
MD Notification    Notified Person: MD    Notified Person Name: Dr Contreras    Notification Date/Time: 01/06/25, 0130    Notification Interaction: Talked with MD    Purpose of Notification: Pt c/o pain 8/10. Too nauseous for anything oral. Can I get IV pain med please    Orders Received: IV dilauded ordered    Comments:

## 2025-01-06 NOTE — PROGRESS NOTES
[id number]  PRIMARY Concern: Abdominal discomfort/distention  SAFETY RISK Concerns (fall risk, behaviors, etc.): none      Aggression Tool Color: green  Isolation/Type: n/a  Tests/Procedures for NEXT shift: None scheduled   Consults? (Pending/following, signed-off?) Gen surgery following.  Where is patient from? (Home, TCU, etc.): Home with   Other Important info for NEXT shift: BG checks. Hx recent lap appi 12/22, 3 lap sites WNL. Distended. Having frequent BM's and passing gas. Aggressive bowel regimen in place.   Anticipated DC date & active delays: TBD  ________________________________________________________________________  SUMMARY NOTE:                Orientation/Cognitive: AOX4  Observation Goals (Met/ Not Met): not met  Mobility Level/Assist Equipment: Ind, frequent ambulation in hallways  Antibiotics & Plan (IV/po, length of tx left): n/a  Pain Management: c/o some abdominal discomfort, PRN tyelonl provided  Tele/VS/O2: VSS on RA.   ABNL Lab/BG: potassium replaced, recheck in AM   Diet: Reg  Bowel/Bladder: Continent Tap water enema given 2x this shift  Skin Concerns: noted 3x lap sites  Drains/Devices: PIV SL  Patient Stated Goal for Today: Rest

## 2025-01-06 NOTE — PLAN OF CARE
Goal Outcome Evaluation:      Plan of Care Reviewed With: patient    Overall Patient Progress: declining (abd pain and distension worse)Overall Patient Progress: declining (abd pain and distension worse)     PRIMARY Concern: Abdominal discomfort/distention  SAFETY RISK Concerns (fall risk, behaviors, etc.): none      Aggression Tool Color: green  Isolation/Type: n/a  Tests/Procedures for NEXT shift: abd US  Consults? (Pending/following, signed-off?) Gen surgery following.  Where is patient from? (Home, TCU, etc.): Home with   Other Important info for NEXT shift: BG checks. Hx recent lap appi 12/22, 3 lap sites WNL. Distended. Tender to palpation, pain and unable to pass gas. NGT ordered and placed.  Anticipated DC date & active delays: TBD  ________________________________________________________________________  SUMMARY NOTE:                Orientation/Cognitive: AOX4  Observation Goals (Met/ Not Met): in pt  Mobility Level/Assist Equipment: Ind, frequent ambulation in hallways  Antibiotics & Plan (IV/po, length of tx left): n/a  Pain Management: c/o abdominal discomfort, MD paged for IV pain meds. IV dilauded given x1  with minimal improvement.  Tele/VS/O2: VSS on RA, except HTN and tachycardic while in pain   ABNL Lab/BG: mult labs ordered,   Diet: Reg  Bowel/Bladder: Continent   Skin Concerns: noted 3x lap sites  Drains/Devices: PIV SL  Patient Stated Goal for Today: Rest

## 2025-01-06 NOTE — PROVIDER NOTIFICATION
MD Notification    Notified Person: MD    Notified Person Name: Dr Singletarymaemma Edinson    Notification Date/Time: 01/05/2025, 2330    Notification Interaction: Talked with MD    Purpose of Notification: Pt c/o nausea all day and too soon to give zofran or compazine. (Compazine didn't work when given earlier today). Can she get order for something else please? Thanks    Orders Received: 1 time dose Reglan ordered.    Comments:

## 2025-01-07 ENCOUNTER — APPOINTMENT (OUTPATIENT)
Dept: GENERAL RADIOLOGY | Facility: CLINIC | Age: 60
DRG: 389 | End: 2025-01-07
Attending: PHYSICIAN ASSISTANT
Payer: MEDICARE

## 2025-01-07 LAB
ANION GAP SERPL CALCULATED.3IONS-SCNC: 8 MMOL/L (ref 7–15)
BASOPHILS # BLD AUTO: 0 10E3/UL (ref 0–0.2)
BASOPHILS NFR BLD AUTO: 0 %
BUN SERPL-MCNC: 9.1 MG/DL (ref 6–20)
CALCIUM SERPL-MCNC: 8.5 MG/DL (ref 8.8–10.4)
CHLORIDE SERPL-SCNC: 100 MMOL/L (ref 98–107)
CREAT SERPL-MCNC: 0.58 MG/DL (ref 0.51–0.95)
EGFRCR SERPLBLD CKD-EPI 2021: >90 ML/MIN/1.73M2
EOSINOPHIL # BLD AUTO: 0 10E3/UL (ref 0–0.7)
EOSINOPHIL NFR BLD AUTO: 0 %
ERYTHROCYTE [DISTWIDTH] IN BLOOD BY AUTOMATED COUNT: 13.3 % (ref 10–15)
GLUCOSE BLDC GLUCOMTR-MCNC: 168 MG/DL (ref 70–99)
GLUCOSE BLDC GLUCOMTR-MCNC: 182 MG/DL (ref 70–99)
GLUCOSE BLDC GLUCOMTR-MCNC: 188 MG/DL (ref 70–99)
GLUCOSE BLDC GLUCOMTR-MCNC: 192 MG/DL (ref 70–99)
GLUCOSE BLDC GLUCOMTR-MCNC: 201 MG/DL (ref 70–99)
GLUCOSE BLDC GLUCOMTR-MCNC: 232 MG/DL (ref 70–99)
GLUCOSE SERPL-MCNC: 192 MG/DL (ref 70–99)
HCO3 SERPL-SCNC: 29 MMOL/L (ref 22–29)
HCT VFR BLD AUTO: 35.2 % (ref 35–47)
HGB BLD-MCNC: 11.1 G/DL (ref 11.7–15.7)
IMM GRANULOCYTES # BLD: 0.1 10E3/UL
IMM GRANULOCYTES NFR BLD: 1 %
LYMPHOCYTES # BLD AUTO: 1.2 10E3/UL (ref 0.8–5.3)
LYMPHOCYTES NFR BLD AUTO: 12 %
MCH RBC QN AUTO: 26.9 PG (ref 26.5–33)
MCHC RBC AUTO-ENTMCNC: 31.5 G/DL (ref 31.5–36.5)
MCV RBC AUTO: 85 FL (ref 78–100)
MONOCYTES # BLD AUTO: 0.8 10E3/UL (ref 0–1.3)
MONOCYTES NFR BLD AUTO: 8 %
NEUTROPHILS # BLD AUTO: 8.1 10E3/UL (ref 1.6–8.3)
NEUTROPHILS NFR BLD AUTO: 80 %
NRBC # BLD AUTO: 0 10E3/UL
NRBC BLD AUTO-RTO: 0 /100
PLATELET # BLD AUTO: 447 10E3/UL (ref 150–450)
POTASSIUM SERPL-SCNC: 3.8 MMOL/L (ref 3.4–5.3)
RBC # BLD AUTO: 4.12 10E6/UL (ref 3.8–5.2)
SODIUM SERPL-SCNC: 137 MMOL/L (ref 135–145)
WBC # BLD AUTO: 10.2 10E3/UL (ref 4–11)

## 2025-01-07 PROCEDURE — 85004 AUTOMATED DIFF WBC COUNT: CPT | Performed by: INTERNAL MEDICINE

## 2025-01-07 PROCEDURE — 85014 HEMATOCRIT: CPT | Performed by: INTERNAL MEDICINE

## 2025-01-07 PROCEDURE — 74018 RADEX ABDOMEN 1 VIEW: CPT

## 2025-01-07 PROCEDURE — 36415 COLL VENOUS BLD VENIPUNCTURE: CPT | Performed by: INTERNAL MEDICINE

## 2025-01-07 PROCEDURE — 250N000013 HC RX MED GY IP 250 OP 250 PS 637: Performed by: SURGERY

## 2025-01-07 PROCEDURE — 250N000013 HC RX MED GY IP 250 OP 250 PS 637: Performed by: INTERNAL MEDICINE

## 2025-01-07 PROCEDURE — 120N000001 HC R&B MED SURG/OB

## 2025-01-07 PROCEDURE — 258N000001 HC RX 258: Performed by: INTERNAL MEDICINE

## 2025-01-07 PROCEDURE — 250N000011 HC RX IP 250 OP 636: Performed by: INTERNAL MEDICINE

## 2025-01-07 PROCEDURE — 99232 SBSQ HOSP IP/OBS MODERATE 35: CPT | Performed by: INTERNAL MEDICINE

## 2025-01-07 PROCEDURE — 80048 BASIC METABOLIC PNL TOTAL CA: CPT | Performed by: INTERNAL MEDICINE

## 2025-01-07 RX ORDER — POTASSIUM CHLORIDE 1.5 G/1.58G
20 POWDER, FOR SOLUTION ORAL ONCE
Status: COMPLETED | OUTPATIENT
Start: 2025-01-07 | End: 2025-01-07

## 2025-01-07 RX ADMIN — POTASSIUM CHLORIDE, DEXTROSE MONOHYDRATE AND SODIUM CHLORIDE: 150; 5; 900 INJECTION, SOLUTION INTRAVENOUS at 07:17

## 2025-01-07 RX ADMIN — Medication 1 ML: at 22:09

## 2025-01-07 RX ADMIN — GABAPENTIN 600 MG: 600 TABLET, FILM COATED ORAL at 14:35

## 2025-01-07 RX ADMIN — MAGNESIUM HYDROXIDE 30 ML: 400 SUSPENSION ORAL at 08:19

## 2025-01-07 RX ADMIN — ONDANSETRON 4 MG: 2 INJECTION, SOLUTION INTRAMUSCULAR; INTRAVENOUS at 11:45

## 2025-01-07 RX ADMIN — TRAZODONE HYDROCHLORIDE 100 MG: 100 TABLET ORAL at 22:01

## 2025-01-07 RX ADMIN — POTASSIUM CHLORIDE, DEXTROSE MONOHYDRATE AND SODIUM CHLORIDE: 150; 5; 900 INJECTION, SOLUTION INTRAVENOUS at 21:05

## 2025-01-07 RX ADMIN — FAMOTIDINE 20 MG: 10 INJECTION, SOLUTION INTRAVENOUS at 17:50

## 2025-01-07 RX ADMIN — PROCHLORPERAZINE EDISYLATE 10 MG: 5 INJECTION INTRAMUSCULAR; INTRAVENOUS at 22:00

## 2025-01-07 RX ADMIN — DIATRIZOATE MEGLUMINE AND DIATRIZOATE SODIUM 90 ML: 660; 100 SOLUTION ORAL; RECTAL at 06:50

## 2025-01-07 RX ADMIN — OLANZAPINE 15 MG: 15 TABLET, FILM COATED ORAL at 22:01

## 2025-01-07 RX ADMIN — FAMOTIDINE 20 MG: 10 INJECTION, SOLUTION INTRAVENOUS at 05:43

## 2025-01-07 RX ADMIN — Medication 1 ML: at 06:03

## 2025-01-07 RX ADMIN — GABAPENTIN 600 MG: 600 TABLET, FILM COATED ORAL at 08:19

## 2025-01-07 RX ADMIN — Medication 1 ML: at 20:55

## 2025-01-07 RX ADMIN — CLOZAPINE 25 MG: 25 TABLET ORAL at 22:01

## 2025-01-07 RX ADMIN — ONDANSETRON 4 MG: 2 INJECTION, SOLUTION INTRAMUSCULAR; INTRAVENOUS at 06:01

## 2025-01-07 RX ADMIN — POTASSIUM CHLORIDE 20 MEQ: 1.5 POWDER, FOR SOLUTION ORAL at 17:50

## 2025-01-07 RX ADMIN — ONDANSETRON 4 MG: 2 INJECTION, SOLUTION INTRAMUSCULAR; INTRAVENOUS at 17:59

## 2025-01-07 RX ADMIN — GABAPENTIN 600 MG: 600 TABLET, FILM COATED ORAL at 22:01

## 2025-01-07 ASSESSMENT — ACTIVITIES OF DAILY LIVING (ADL)
ADLS_ACUITY_SCORE: 51
ADLS_ACUITY_SCORE: 47
ADLS_ACUITY_SCORE: 48
ADLS_ACUITY_SCORE: 48
ADLS_ACUITY_SCORE: 50
ADLS_ACUITY_SCORE: 48
ADLS_ACUITY_SCORE: 47
ADLS_ACUITY_SCORE: 48
ADLS_ACUITY_SCORE: 51
ADLS_ACUITY_SCORE: 50
ADLS_ACUITY_SCORE: 48
ADLS_ACUITY_SCORE: 48
ADLS_ACUITY_SCORE: 51
ADLS_ACUITY_SCORE: 48
ADLS_ACUITY_SCORE: 51
ADLS_ACUITY_SCORE: 50
ADLS_ACUITY_SCORE: 51
ADLS_ACUITY_SCORE: 47
ADLS_ACUITY_SCORE: 48

## 2025-01-07 NOTE — PLAN OF CARE
Goal Outcome Evaluation:     Pt transferred onto unit this afternoon    Orientation: A/Ox4  Aggression Stop Light: Green  Activity: Ind  Diet/BS Checks: NPO  Tele:  n/a  IV Access/Drains: PIV infusing D5 + NS+ K @ 75ml/hr  Pain Management: Denies, has tylenol and dilaudid available  Abnormal VS/Results: VSS on ex HTN  Bowel/Bladder: No bowel sounds or passing gas  Skin/Wounds: WNL, lap sites x3  Consults: Surgery  D/C Disposition: Pending  Other Info:   -NG to LIS, green output   -BG checks remain stable, no no correction given  - K 3.7, per MD ok for recheck tomorrow morning as pt is on high replacement protocol

## 2025-01-07 NOTE — PROGRESS NOTES
"[facility]    Medicine Progress Note - Hospitalist Service    Date of Admission:  1/2/2025    Assessment & Plan    Monica is a 59-year-old female with a history of type 2 diabetes mellitus, hypertension, hyperlipidemia and recent laparoscopic appendectomy 12/22/2024 who was admitted to [facility] on 1/2/2025 for acute abdominal pain/distention secondary to ileus versus distal SBO.  If tolerating regular diet, can discharge as early as 1/6/2025    Postop ileus versus early small bowel obstruction in setting of recent laparoscopic appendectomy on 12/22/2024  CT AP W1/2/25: Ileus versus partial distal SBO with transition point terminal ileum  General surgery consult requested, I appreciate Darinel Ibarra and Dr. Styles's follow up  Also appreciate Dr. Contreras's care, repeat CT scan of the abdomen pelvis 1/6 shows, \"mild diffuse gas and fluid distention of the small and large bowel consistent with ileus.\"  she also ordered abdominal ultrasound labs, n.p.o.  She continues, \"paged again by the nurse because patient continues to complain of abdominal distention nausea and abdominal pain; not passing gas as per RN; NGT to LIS ordered.\"  Per general surgery, continue NG to LIS  \"Ambulate, she likely has this condition from lack of moving around.   Gallbladder thickening likely not clinically significant.  Taking it out laparoscopically would not be technically feasible anyway right now given the amount of colonic distention she has.\"  And on 1/7:  \"AXR reviewed following gastrografin administration this morning.   Contrast identified ing ascending colon, but bowel pattern show significant distention and fluid in small bowel and colon.   Removed hold on MAR for miralax and pericolace so that can be given. Enema daily as needed.  NG tube output yesterday was >2.6L. No output recorded on dayshift due to clamping after gastrografin.   Return NG tube to low intermittent suction as she will likely benefit from " "another day of proximal bowel decompression via the NG tube and hopefully distal decompression will activate as well.\"  I appreciate surgery follow up.   Added IVF while NPO, watching for signs or symptoms of fluid overload  Follow CBC    Diabetes mellitus type 2 complicated by neuropathy  PTA insulin glargine 46 units at bedtime, Ozempic  Hold PTA Tresiba and Ozempic  Low-dose Lantus 10 units nightly  Medium scale corrective dose  Resume PTA gabapentin 600 mg p.o. 3 times daily when has oral intake  1/7: Blood sugar readings are slightly above goal    Anasarca  Unclear etiology  Reports history of fatty liver but no evidence of liver cirrhosis on her recent CT abdomen scans  Does have some chronic lower extremity edema for which she takes Lasix  No history of CHF, recent echocardiogram on 12/31/24 showed normal ejection fraction  she was advised to hold Actos for now and follow-up with PCP  As above, begin IVF while she is n.p.o. and has significant NG output    Hyponatremia - resolved  Hypertension - Resume PTA enalapril when taking oral  Dyslipidemia - hold PTA statin given n.p.o. status  Schizophrenia - Resume PTA Zyprexa, trazodone, Effexor and Clozaril when taking oral  Bipolar disorder   Insomnia    Diet: NPO for Medical/Clinical Reasons Except for: Meds  Diet    DVT Prophylaxis: Pneumatic Compression Devices  Yi Catheter: Not present  Lines: None     Cardiac Monitoring: None  Code Status: Full Code      Disposition Plan     Medically Ready for Discharge: Anticipated discharge in 2 to 4 days, when ileus has improved and patient is tolerating oral intake    Maggy Duron MD  Hospitalist Service  [facility]  Securely message with [employer] (more info)  Text page via [employer] Paging/Directory   ______________________________________________________________________    Interval History   \"I would be a lot better if they took this tube out.\"  Patient again reports significant throat " discomfort associated with NG tube.  She asks who will receive the x-ray results, when NG tube can be removed.  She says she passed gas several times today also had a small bowel movement.  She has no respiratory complaints.   is again at the bedside    Physical Exam   Vital Signs: Temp: 97.6  F (36.4  C) Temp src: Axillary BP: (!) 152/67 Pulse: 91   Resp: 18 SpO2: 94 % O2 Device: None (Room air)    Weight: 301 lbs 6.4 oz    Constitutional: Awake, alert  Respiratory: Clear to auscultation bilaterally, no crackles or wheezing  Cardiovascular:  regular rate and rhythm  GI: Abdomen is obese, very distended with rare bowel sounds, slightly tender throughout with no rebound or guarding  Skin/Integumen: No rash on exposed skin.  Modest bilateral lower extremity edema, patient says this is chronic  Other: Mood is distressed      Medical Decision Making       35 MINUTES SPENT BY ME on the date of service doing chart review, history, exam, documentation & further activities per the note.   Including discussion with patient,  and bedside RN    Data   ------------------------- PAST 24 HR DATA REVIEWED -----------------------------------------------

## 2025-01-07 NOTE — PROGRESS NOTES
"[facility]  General Surgery Progress Note        Leonidas Styles MD   01/07/2025        Interval History:      Small bowel movement, some gas.  Gastrograffin given at 0700.  Xray at 1300         Assessment and Plan:      1) If xray shows gastrografin to her colon, could remove NGT and start clears.  Would also have her take miralax as well.                     Physical Exam:      Blood pressure (!) 152/67, pulse 91, temperature 97.6  F (36.4  C), temperature source Axillary, resp. rate 18, height 1.651 m (5' 5\"), weight 136.7 kg (301 lb 6.4 oz), SpO2 94%.  Vitals:    01/03/25 1546   Weight: 136.7 kg (301 lb 6.4 oz)     Vital Signs with Ranges  Temp:  [97.6  F (36.4  C)-98.2  F (36.8  C)] 97.6  F (36.4  C)  Pulse:  [] 91  Resp:  [18] 18  BP: (109-164)/(67-87) 152/67  SpO2:  [91 %-95 %] 94 %  I/O's Last 24 hours  I/O last 3 completed shifts:  In: -   Out: 2650 [Emesis/NG output:2650]    Very distended, mildly tender              Medications:        Current Facility-Administered Medications   Medication Dose Route Frequency Provider Last Rate Last Admin    cloZAPine (CLOZARIL) tablet 25 mg  25 mg Oral At Bedtime Belgica Contreras MD   25 mg at 01/06/25 2125    [Held by provider] enalapril (VASOTEC) tablet 2.5 mg  2.5 mg Oral QAM Belgica Contreras MD   2.5 mg at 01/06/25 0812    famotidine (PEPCID) injection 20 mg  20 mg Intravenous Q12H Belgica Contreras MD   20 mg at 01/07/25 0543    [Held by provider] furosemide (LASIX) tablet 20 mg  20 mg Oral BID Maggy Duron MD        gabapentin (NEURONTIN) tablet 600 mg  600 mg Oral TID Belgica Contreras MD   600 mg at 01/07/25 0819    insulin aspart (NovoLOG) injection (RAPID ACTING)  1-7 Units Subcutaneous TID AC Maggy Duron MD   2 Units at 01/07/25 0648    insulin aspart (NovoLOG) injection (RAPID ACTING)  1-5 Units Subcutaneous At Bedtime Maggy Duron MD        [Held by provider] insulin glargine (LANTUS PEN) " injection 10 Units  10 Units Subcutaneous At Bedtime Belgica Contreras MD   10 Units at 01/05/25 2209    magnesium hydroxide (MILK OF MAGNESIA) suspension 30 mL  30 mL Oral Daily Jc Rushing MD   30 mL at 01/07/25 0819    OLANZapine (zyPREXA) tablet 15 mg  15 mg Oral At Bedtime Belgica Contreras MD   15 mg at 01/06/25 2125    [Held by provider] polyethylene glycol (MIRALAX) Packet 17 g  17 g Oral BID Maggy Duron MD   17 g at 01/06/25 0812    [Held by provider] senna-docusate (SENOKOT-S/PERICOLACE) 8.6-50 MG per tablet 1 tablet  1 tablet Oral BID Maggy Duron MD   1 tablet at 01/06/25 0812    sodium chloride (PF) 0.9% PF flush 3 mL  3 mL Intracatheter Q8H Belgica Contreras MD   3 mL at 01/07/25 0819    traZODone (DESYREL) tablet 100 mg  100 mg Oral At Bedtime Belgica Contreras MD   100 mg at 01/06/25 2125    venlafaxine (EFFEXOR XR) 24 hr capsule 150 mg  150 mg Oral At Bedtime Belgica Contreras MD   150 mg at 01/05/25 2210            Data:      All new lab and imaging data was reviewed.   Recent Labs   Lab Test 01/06/25  0732 01/03/25  0847 01/02/25  2257 12/27/20  0729 12/27/20  0728 12/26/20  0716 12/25/20  0640   WBC 18.5* 8.9 11.1*   < >  --  5.5 5.0   HGB 13.2 11.7 12.3   < >  --  12.6 12.1   MCV 86 84 83   < >  --  82 82   * 355 385   < >  --  353 329   INR  --   --   --   --  1.09 1.13* 1.11*    < > = values in this interval not displayed.

## 2025-01-07 NOTE — PLAN OF CARE
Orientation: A&Ox4  Aggression Stop Light: Green  Activity: Ind in room, SBA in halls. Ambulated in halls x3 this shift.   Diet/BS Checks: NPO ex Meds. BG q4h while NPO  Tele:  N/A  IV Access/Drains: L PIV infusing IVF @ 75mL/hr  - NGT to LIS with green output  Pain Management: Denies pain  Abnormal VS/Results: VSS on RA ex HTN  - WBC 18.5  Bowel/Bladder: continent of B/B. No BM this shift. Not passing gas. Bowel sounds absent.   Skin/Wounds: abdominal bruising. 3x abdominal lap sites WDL  Consults: surgery  D/C Disposition: pending  Other Info:   - PRN zofran and compazine given for nausea  - Gastrografin given this AM for abdominal XR scheduled for 1300  - per Surgery note, consider HIDA scan if no improvement   - chloraseptic spray given

## 2025-01-07 NOTE — PROGRESS NOTES
Surgery    AXR reviewed following gastrografin administration this morning.     Contrast identified ing ascending colon, but bowel pattern show significant distention and fluid in small bowel and colon.     Removed hold on MAR for miralax and pericolace so that can be given. Enema daily as needed.   NG tube output yesterday was >2.6L. No output recorded on dayshift due to clamping after gastrografin.     Return NG tube to low intermittent suction as she will likely benefit from another day of proximal bowel decompression via the NG tube and hopefully distal decompression will activate as well.      Darinel Ibarra PA-C

## 2025-01-08 ENCOUNTER — APPOINTMENT (OUTPATIENT)
Dept: PHYSICAL THERAPY | Facility: CLINIC | Age: 60
DRG: 389 | End: 2025-01-08
Attending: SURGERY
Payer: MEDICARE

## 2025-01-08 LAB
ANION GAP SERPL CALCULATED.3IONS-SCNC: 11 MMOL/L (ref 7–15)
BUN SERPL-MCNC: 8.5 MG/DL (ref 6–20)
CALCIUM SERPL-MCNC: 8.7 MG/DL (ref 8.8–10.4)
CHLORIDE SERPL-SCNC: 105 MMOL/L (ref 98–107)
CREAT SERPL-MCNC: 0.59 MG/DL (ref 0.51–0.95)
EGFRCR SERPLBLD CKD-EPI 2021: >90 ML/MIN/1.73M2
ERYTHROCYTE [DISTWIDTH] IN BLOOD BY AUTOMATED COUNT: 13.3 % (ref 10–15)
GLUCOSE BLDC GLUCOMTR-MCNC: 172 MG/DL (ref 70–99)
GLUCOSE BLDC GLUCOMTR-MCNC: 184 MG/DL (ref 70–99)
GLUCOSE BLDC GLUCOMTR-MCNC: 189 MG/DL (ref 70–99)
GLUCOSE BLDC GLUCOMTR-MCNC: 201 MG/DL (ref 70–99)
GLUCOSE SERPL-MCNC: 243 MG/DL (ref 70–99)
HCO3 SERPL-SCNC: 26 MMOL/L (ref 22–29)
HCT VFR BLD AUTO: 37.6 % (ref 35–47)
HGB BLD-MCNC: 11.9 G/DL (ref 11.7–15.7)
MCH RBC QN AUTO: 27.1 PG (ref 26.5–33)
MCHC RBC AUTO-ENTMCNC: 31.6 G/DL (ref 31.5–36.5)
MCV RBC AUTO: 86 FL (ref 78–100)
PLATELET # BLD AUTO: 492 10E3/UL (ref 150–450)
POTASSIUM SERPL-SCNC: 3.9 MMOL/L (ref 3.4–5.3)
RBC # BLD AUTO: 4.39 10E6/UL (ref 3.8–5.2)
SODIUM SERPL-SCNC: 142 MMOL/L (ref 135–145)
WBC # BLD AUTO: 9.9 10E3/UL (ref 4–11)

## 2025-01-08 PROCEDURE — 250N000011 HC RX IP 250 OP 636: Performed by: INTERNAL MEDICINE

## 2025-01-08 PROCEDURE — 258N000001 HC RX 258: Performed by: INTERNAL MEDICINE

## 2025-01-08 PROCEDURE — 250N000013 HC RX MED GY IP 250 OP 250 PS 637: Performed by: INTERNAL MEDICINE

## 2025-01-08 PROCEDURE — 120N000001 HC R&B MED SURG/OB

## 2025-01-08 PROCEDURE — 80048 BASIC METABOLIC PNL TOTAL CA: CPT | Performed by: INTERNAL MEDICINE

## 2025-01-08 PROCEDURE — 250N000013 HC RX MED GY IP 250 OP 250 PS 637: Performed by: STUDENT IN AN ORGANIZED HEALTH CARE EDUCATION/TRAINING PROGRAM

## 2025-01-08 PROCEDURE — 82310 ASSAY OF CALCIUM: CPT | Performed by: INTERNAL MEDICINE

## 2025-01-08 PROCEDURE — 36415 COLL VENOUS BLD VENIPUNCTURE: CPT | Performed by: INTERNAL MEDICINE

## 2025-01-08 PROCEDURE — 999N000128 HC STATISTIC PERIPHERAL IV START W/O US GUIDANCE

## 2025-01-08 PROCEDURE — 250N000013 HC RX MED GY IP 250 OP 250 PS 637: Performed by: SURGERY

## 2025-01-08 PROCEDURE — 97116 GAIT TRAINING THERAPY: CPT | Mod: GP

## 2025-01-08 PROCEDURE — 99232 SBSQ HOSP IP/OBS MODERATE 35: CPT | Mod: 24 | Performed by: STUDENT IN AN ORGANIZED HEALTH CARE EDUCATION/TRAINING PROGRAM

## 2025-01-08 PROCEDURE — 97161 PT EVAL LOW COMPLEX 20 MIN: CPT | Mod: GP

## 2025-01-08 PROCEDURE — 97530 THERAPEUTIC ACTIVITIES: CPT | Mod: GP

## 2025-01-08 PROCEDURE — 99232 SBSQ HOSP IP/OBS MODERATE 35: CPT | Performed by: INTERNAL MEDICINE

## 2025-01-08 PROCEDURE — 85014 HEMATOCRIT: CPT | Performed by: INTERNAL MEDICINE

## 2025-01-08 RX ORDER — BENZOCAINE/MENTHOL 6 MG-10 MG
LOZENGE MUCOUS MEMBRANE 2 TIMES DAILY
Status: DISCONTINUED | OUTPATIENT
Start: 2025-01-08 | End: 2025-01-10 | Stop reason: HOSPADM

## 2025-01-08 RX ADMIN — TRAZODONE HYDROCHLORIDE 100 MG: 100 TABLET ORAL at 21:39

## 2025-01-08 RX ADMIN — Medication 1 LOZENGE: at 14:31

## 2025-01-08 RX ADMIN — CLOZAPINE 25 MG: 25 TABLET ORAL at 21:39

## 2025-01-08 RX ADMIN — OLANZAPINE 15 MG: 15 TABLET, FILM COATED ORAL at 21:38

## 2025-01-08 RX ADMIN — POTASSIUM CHLORIDE, DEXTROSE MONOHYDRATE AND SODIUM CHLORIDE: 150; 5; 900 INJECTION, SOLUTION INTRAVENOUS at 09:01

## 2025-01-08 RX ADMIN — GABAPENTIN 600 MG: 600 TABLET, FILM COATED ORAL at 13:09

## 2025-01-08 RX ADMIN — GABAPENTIN 600 MG: 600 TABLET, FILM COATED ORAL at 21:39

## 2025-01-08 RX ADMIN — Medication 1 LOZENGE: at 13:10

## 2025-01-08 RX ADMIN — GABAPENTIN 600 MG: 600 TABLET, FILM COATED ORAL at 08:09

## 2025-01-08 RX ADMIN — MAGNESIUM HYDROXIDE 30 ML: 400 SUSPENSION ORAL at 08:09

## 2025-01-08 RX ADMIN — FAMOTIDINE 20 MG: 10 INJECTION, SOLUTION INTRAVENOUS at 04:52

## 2025-01-08 RX ADMIN — HYDROCORTISONE: 10 CREAM TOPICAL at 20:08

## 2025-01-08 RX ADMIN — Medication 1 ML: at 04:46

## 2025-01-08 RX ADMIN — Medication 1 LOZENGE: at 16:20

## 2025-01-08 RX ADMIN — Medication 1 ML: at 09:03

## 2025-01-08 ASSESSMENT — ACTIVITIES OF DAILY LIVING (ADL)
ADLS_ACUITY_SCORE: 50

## 2025-01-08 NOTE — PROGRESS NOTES
"[facility]    Medicine Progress Note - Hospitalist Service    Date of Admission:  1/2/2025    Assessment & Plan    Monica is a 59-year-old female with a history of type 2 diabetes mellitus, hypertension, hyperlipidemia and recent laparoscopic appendectomy 12/22/2024 who was admitted to [facility] on 1/2/2025 for acute abdominal pain/distention secondary to ileus versus distal SBO.  If tolerating regular diet, can discharge as early as 1/6/2025    Postop ileus versus early small bowel obstruction in setting of recent laparoscopic appendectomy on 12/22/2024  CT AP W1/2/25: Ileus versus partial distal SBO with transition point terminal ileum  General surgery consult requested, I appreciate Darinel Ibarra and Dr. Styles's follow up  Also appreciate Dr. Contreras's care, repeat CT scan of the abdomen pelvis 1/6 shows, \"mild diffuse gas and fluid distention of the small and large bowel consistent with ileus.\"  she also ordered abdominal ultrasound labs, n.p.o.  She continues, \"paged again by the nurse because patient continues to complain of abdominal distention nausea and abdominal pain; not passing gas as per RN; NGT to LIS ordered.\"  Per general surgery, continue NG to LIS  \"Ambulate, she likely has this condition from lack of moving around.   Gallbladder thickening likely not clinically significant.  Taking it out laparoscopically would not be technically feasible anyway right now given the amount of colonic distention she has.\"  And on 1/7:  \"AXR reviewed following gastrografin administration this morning.   Contrast identified in ascending colon, but bowel pattern show significant distention and fluid in small bowel and colon.   Removed hold on MAR for miralax and pericolace so that can be given. Enema daily as needed.  NG tube output yesterday was >2.6L. No output recorded on dayshift due to clamping after gastrografin.   Return NG tube to low intermittent suction as she will likely benefit from " "another day of proximal bowel decompression via the NG tube and hopefully distal decompression will activate as well.\"  I appreciate surgery follow up.   Added IVF while NPO, watching for signs or symptoms of fluid overload  And on 1/8, \"Recommend to clamp NG for 6hrs. If tolerating and continues to have evidence of bowel function, ok to advance diet to clears. Patient encouraged to go slow.\"  Follow CBC    Diabetes mellitus type 2 complicated by neuropathy  PTA insulin glargine 46 units at bedtime, Ozempic  Hold PTA Glargine, Ozempic  Medium scale corrective dose  Resume PTA gabapentin 600 mg p.o. 3 times daily when tolerating oral intake  1/8: Blood sugar readings are close to goal    Anasarca  Unclear etiology  Reports history of fatty liver but no evidence of liver cirrhosis on her recent CT abdomen scans.  Does have some chronic lower extremity edema for which she takes Lasix  No history of CHF, recent echocardiogram on 12/31/24 showed normal ejection fraction  she was advised to hold Actos for now and follow-up with PCP  As above, treated with IVF due to n.p.o. with significant NG output    Hyponatremia - resolved  Hypertension - Resume PTA enalapril when taking oral  Dyslipidemia - hold PTA statin given n.p.o. status  Schizophrenia - Resume PTA Zyprexa, trazodone, Effexor and Clozaril when taking oral  Bipolar disorder   Insomnia    Diet: NPO for Medical/Clinical Reasons Except for: Meds  Diet    DVT Prophylaxis: Pneumatic Compression Devices  Yi Catheter: Not present  Lines: None     Cardiac Monitoring: None  Code Status: Full Code      Disposition Plan     Medically Ready for Discharge: Anticipated discharge in 2 to 4 days, when ileus has improved and patient is tolerating oral intake    Maggy Duron MD  Hospitalist Service  [facility]  Securely message with [employer] (more info)  Text page via [employer] Paging/Directory " "  ______________________________________________________________________    Interval History   \"I walked six times yesterday.\"  Patient says she asked Dr. Rocha if she is walking enough, she says Dr. Rocha told her that more is better, if she is able.  She says she has been passing gas today.  She denies abdominal pain, does mention that her throat hurts due to the NG tube.  She is using throat spray, lozenges and swabs.  She has a faint rash on her right hand.    Physical Exam   Vital Signs: Temp: 97.5  F (36.4  C) Temp src: Oral BP: (!) 148/78 Pulse: 98   Resp: 18 SpO2: 93 % O2 Device: None (Room air)    Weight: 301 lbs 6.4 oz    Constitutional: Awake, alert  Respiratory: Clear to auscultation bilaterally, no crackles or wheezing  Cardiovascular:  regular rate and rhythm  GI: Abdomen is obese, very distended with bowel sounds, slightly tender throughout with no rebound or guarding  Skin/Integumen: Left hand has some faint redness.  There is modest bilateral lower extremity edema  Other: Mood seems anxious      Medical Decision Making       40 MINUTES SPENT BY ME on the date of service doing chart review, history, exam, documentation & further activities per the note.   Including discussion with patient, her  and bedside RN    Data   ------------------------- PAST 24 HR DATA REVIEWED -----------------------------------------------  "

## 2025-01-08 NOTE — PLAN OF CARE
Orientation: A&Ox4  Aggression Stop Light: Green  Activity: Ind in room, SBA in halls. Ambulated in halls x1 this shift.   Diet/BS Checks: NPO ex Meds. NGT clamped for 30-60 mins after meds. BG q4h while NPO  Tele:  N/A  IV Access/Drains: L PIV infusing D5 KCl @ 75mL/hr  - NGT to LIS with green output   Pain Management: Denies pain  Abnormal VS/Results: VSS on RA ex HTN  Bowel/Bladder: continent of B/B. Bowel sounds hypoactive and faint. No BM this shift.   Skin/Wounds: abdominal bruising. 3x abdominal lap sites WDL  Consults: surgery  D/C Disposition: pending  Other Info:   - PRN compazine x1 for c/o nausea  - gastrografin challenge yesterday, see results  - chloraseptic spray given for throat discomfort   - Pt states she is passing small amounts of gas

## 2025-01-08 NOTE — PLAN OF CARE
Goal Outcome Evaluation:      Orientation: A&Ox4  Aggression Stop Light: Green  Activity: Ind in room, SBA in halls. Ambulated in halls x3 this shift.   Diet/BS Checks: NPO ex Meds. BG q4h while NPO  Tele:  N/A  IV Access/Drains: L PIV infusing D5 KCL @ 75mL/hr  - NGT to LIS with green output   Pain Management: Denies pain  Abnormal VS/Results: VSS on RA ex HTN  Bowel/Bladder: continent of B/B. Very small BM this shift, pale yellow. Reported passimng small amounts of gas. Bowel sounds absent.   Skin/Wounds: abdominal bruising. 3x abdominal lap sites WDL  Consults: surgery  D/C Disposition: pending  Other Info:   - PRN zofran for nausea x2  - Xray done at 1300  - chloraseptic spray given

## 2025-01-08 NOTE — PROGRESS NOTES
[facility]    General Surgery  Daily Progress Note       Assessment and Plan:   Monica Miguel is a 59 year old female who is admitted for post-op ileus s/p lap appy. Abdomen is less distended. She has passed gas multiple times yesterday. NG output has lessened with thin clear output. Gastrograffin study shows contrast in colon.    Plan:  -- Recommend to clamp NG for 6hrs. If tolerating and continues to have evidence of bowel function, ok to advance diet to clears. Patient encouraged to go slow.  -- Ambulate as much as possible per patient tolerance.   -- General surgery will continue to follow jose Rocha MD         Interval History:   Reports that she has passed gas multiple times yesterday.  She also is ambulating multiple times.  Her distention is improved and her .  NG tube is with thin clear output.         Physical Exam:   Temp: 97.5  F (36.4  C) Temp src: Oral BP: (!) 148/78 Pulse: 98   Resp: 18 SpO2: 93 % O2 Device: None (Room air)      I/O last 3 completed shifts:  In: 2560 [I.V.:2460; NG/GT:100]  Out: 650 [Emesis/NG output:650]      Constitutional: healthy, alert, and no distress   Cardiovascular: RRR  Respiratory: Breathing comfortably on room air  Abdomen: Protuberant and obese, nontender to palpation throughout, mild to moderately distended but difficult to assess due to body habitus, soft, focally tender to palpation around incision sites.  NG tube in place with thin clear output.  Total of 850 cc +350 ml today since midnight      Data   Recent Labs   Lab 01/08/25  0635 01/07/25  2140 01/07/25  1628 01/07/25  1625 01/07/25  1235 01/06/25  1220 01/06/25  0732 01/05/25  0904 01/05/25  0821 01/03/25  1628 01/03/25  0847 01/03/25  0321 01/02/25  2257   WBC  --   --   --   --  10.2  --  18.5*  --   --   --  8.9  --  11.1*   HGB  --   --   --   --  11.1*  --  13.2  --   --   --  11.7  --  12.3   MCV  --   --   --   --  85  --  86  --   --   --  84  --  83   PLT  --    --   --   --  447  --  487*  --   --   --  355  --  385   NA  --   --  137  --   --   --  131*  --   --   --  135  --  132*   POTASSIUM  --   --  3.8  --   --   --  3.7  --  3.6   < > 3.3*  --  4.3   CHLORIDE  --   --  100  --   --   --  92*  --   --   --  98  --  95*   CO2  --   --  29  --   --   --  27  --   --   --  29  --  28   BUN  --   --  9.1  --   --   --  7.6  --   --   --  11.5  --  13.8   CR  --   --  0.58  --   --   --  0.61  --   --   --  0.62  --  0.57   ANIONGAP  --   --  8  --   --   --  12  --   --   --  8  --  9   JUAN DIEGO  --   --  8.5*  --   --   --  9.0  --   --   --  8.6*  --  9.2   * 168* 192*   < >  --    < > 196*   < >  --    < > 101*   < > 122*   ALBUMIN  --   --   --   --   --   --  3.8  --   --   --   --   --  3.3*   PROTTOTAL  --   --   --   --   --   --  7.1  --   --   --   --   --  6.2*   BILITOTAL  --   --   --   --   --   --  0.3  --   --   --   --   --  0.2   ALKPHOS  --   --   --   --   --   --  107  --   --   --   --   --  88   ALT  --   --   --   --   --   --  34  --   --   --   --   --  29   AST  --   --   --   --   --   --  23  --   --   --   --   --  36    < > = values in this interval not displayed.

## 2025-01-09 VITALS
HEIGHT: 65 IN | RESPIRATION RATE: 20 BRPM | SYSTOLIC BLOOD PRESSURE: 158 MMHG | BODY MASS INDEX: 48.82 KG/M2 | HEART RATE: 95 BPM | TEMPERATURE: 98 F | WEIGHT: 293 LBS | DIASTOLIC BLOOD PRESSURE: 78 MMHG | OXYGEN SATURATION: 98 %

## 2025-01-09 LAB
GLUCOSE BLDC GLUCOMTR-MCNC: 171 MG/DL (ref 70–99)
GLUCOSE BLDC GLUCOMTR-MCNC: 171 MG/DL (ref 70–99)
GLUCOSE BLDC GLUCOMTR-MCNC: 172 MG/DL (ref 70–99)
GLUCOSE BLDC GLUCOMTR-MCNC: 191 MG/DL (ref 70–99)
GLUCOSE BLDC GLUCOMTR-MCNC: 203 MG/DL (ref 70–99)
GLUCOSE BLDC GLUCOMTR-MCNC: 223 MG/DL (ref 70–99)
POTASSIUM SERPL-SCNC: 4 MMOL/L (ref 3.4–5.3)

## 2025-01-09 PROCEDURE — 250N000013 HC RX MED GY IP 250 OP 250 PS 637: Performed by: STUDENT IN AN ORGANIZED HEALTH CARE EDUCATION/TRAINING PROGRAM

## 2025-01-09 PROCEDURE — 84132 ASSAY OF SERUM POTASSIUM: CPT | Performed by: INTERNAL MEDICINE

## 2025-01-09 PROCEDURE — 250N000013 HC RX MED GY IP 250 OP 250 PS 637: Performed by: INTERNAL MEDICINE

## 2025-01-09 PROCEDURE — 99231 SBSQ HOSP IP/OBS SF/LOW 25: CPT | Mod: 25 | Performed by: STUDENT IN AN ORGANIZED HEALTH CARE EDUCATION/TRAINING PROGRAM

## 2025-01-09 PROCEDURE — 99232 SBSQ HOSP IP/OBS MODERATE 35: CPT | Performed by: INTERNAL MEDICINE

## 2025-01-09 PROCEDURE — 250N000013 HC RX MED GY IP 250 OP 250 PS 637: Performed by: SURGERY

## 2025-01-09 PROCEDURE — 36415 COLL VENOUS BLD VENIPUNCTURE: CPT | Performed by: INTERNAL MEDICINE

## 2025-01-09 PROCEDURE — 120N000001 HC R&B MED SURG/OB

## 2025-01-09 PROCEDURE — 250N000011 HC RX IP 250 OP 636: Performed by: INTERNAL MEDICINE

## 2025-01-09 RX ADMIN — GABAPENTIN 600 MG: 600 TABLET, FILM COATED ORAL at 21:29

## 2025-01-09 RX ADMIN — TRAZODONE HYDROCHLORIDE 100 MG: 100 TABLET ORAL at 21:30

## 2025-01-09 RX ADMIN — CLOZAPINE 25 MG: 25 TABLET ORAL at 21:29

## 2025-01-09 RX ADMIN — Medication 1 LOZENGE: at 07:57

## 2025-01-09 RX ADMIN — GABAPENTIN 600 MG: 600 TABLET, FILM COATED ORAL at 07:57

## 2025-01-09 RX ADMIN — FAMOTIDINE 20 MG: 10 INJECTION, SOLUTION INTRAVENOUS at 17:11

## 2025-01-09 RX ADMIN — GABAPENTIN 600 MG: 600 TABLET, FILM COATED ORAL at 13:19

## 2025-01-09 RX ADMIN — OLANZAPINE 15 MG: 15 TABLET, FILM COATED ORAL at 21:29

## 2025-01-09 RX ADMIN — HYDROCORTISONE: 10 CREAM TOPICAL at 21:30

## 2025-01-09 RX ADMIN — MAGNESIUM HYDROXIDE 30 ML: 400 SUSPENSION ORAL at 07:57

## 2025-01-09 RX ADMIN — FAMOTIDINE 20 MG: 10 INJECTION, SOLUTION INTRAVENOUS at 05:28

## 2025-01-09 RX ADMIN — HYDROCORTISONE: 10 CREAM TOPICAL at 08:00

## 2025-01-09 RX ADMIN — VENLAFAXINE HYDROCHLORIDE 150 MG: 150 CAPSULE, EXTENDED RELEASE ORAL at 21:29

## 2025-01-09 ASSESSMENT — ACTIVITIES OF DAILY LIVING (ADL)
ADLS_ACUITY_SCORE: 50
ADLS_ACUITY_SCORE: 30
ADLS_ACUITY_SCORE: 33
ADLS_ACUITY_SCORE: 30
DRESSING/BATHING_DIFFICULTY: NO
ADLS_ACUITY_SCORE: 50
ADLS_ACUITY_SCORE: 30
ADLS_ACUITY_SCORE: 33
TOILETING_ISSUES: NO
WALKING_OR_CLIMBING_STAIRS_DIFFICULTY: NO
ADLS_ACUITY_SCORE: 30
ADLS_ACUITY_SCORE: 30
ADLS_ACUITY_SCORE: 50
ADLS_ACUITY_SCORE: 33
ADLS_ACUITY_SCORE: 33
FALL_HISTORY_WITHIN_LAST_SIX_MONTHS: NO
ADLS_ACUITY_SCORE: 30
CONCENTRATING,_REMEMBERING_OR_MAKING_DECISIONS_DIFFICULTY: NO
DOING_ERRANDS_INDEPENDENTLY_DIFFICULTY: NO
ADLS_ACUITY_SCORE: 50
ADLS_ACUITY_SCORE: 33
ADLS_ACUITY_SCORE: 50
ADLS_ACUITY_SCORE: 33
DIFFICULTY_EATING/SWALLOWING: NO
WEAR_GLASSES_OR_BLIND: NO
CHANGE_IN_FUNCTIONAL_STATUS_SINCE_ONSET_OF_CURRENT_ILLNESS/INJURY: NO
ADLS_ACUITY_SCORE: 33

## 2025-01-09 NOTE — PROGRESS NOTES
[facility]    General Surgery  Daily Progress Note       Assessment and Plan:   Monica Miguel is a 59 year old female who is admitted for post-op ileus s/p lap appy. Abdomen is less distended. She has passed gas multiple times yesterday with BM. NG removed.     Plan:  -- Removed NG. Advanced to fulls. If tolerating and doing ok, ok to advance to soft diet this evening.  -- Avoid narcotics  -- Anticipate discharge tomorrow.  -- Ambulate as much as possible per patient tolerance.   -- General surgery will continue to follow jose Rocha MD         Interval History:   Walked a bunch. Feeling better. Back pain and distention improved. +flatus and BM         Physical Exam:   Temp: 98.3  F (36.8  C) Temp src: Oral BP: (!) 166/83 Pulse: 87   Resp: 16 SpO2: 95 % O2 Device: None (Room air)      I/O last 3 completed shifts:  In: 240 [P.O.:240]  Out: -       Constitutional: healthy, alert, and no distress   Cardiovascular: RRR  Respiratory: Breathing comfortably on room air  Abdomen: Protuberant and obese, nontender to palpation throughout, mild to moderately distended but difficult to assess due to body habitus, soft, focally tender to palpation around incision sites.        Data   Recent Labs   Lab 01/09/25  0757 01/09/25  0548 01/09/25  0146 01/08/25  2141 01/08/25  1931 01/07/25  2140 01/07/25  1628 01/07/25  1625 01/07/25  1235 01/06/25  1220 01/06/25  0732 01/03/25  0321 01/02/25  8197   WBC  --   --   --   --  9.9  --   --   --  10.2  --  18.5*   < > 11.1*   HGB  --   --   --   --  11.9  --   --   --  11.1*  --  13.2   < > 12.3   MCV  --   --   --   --  86  --   --   --  85  --  86   < > 83   PLT  --   --   --   --  492*  --   --   --  447  --  487*   < > 385   NA  --   --   --   --  142  --  137  --   --   --  131*   < > 132*   POTASSIUM  --  4.0  --   --  3.9  --  3.8  --   --   --  3.7   < > 4.3   CHLORIDE  --   --   --   --  105  --  100  --   --   --  92*   < > 95*   CO2  --   --    --   --  26  --  29  --   --   --  27   < > 28   BUN  --   --   --   --  8.5  --  9.1  --   --   --  7.6   < > 13.8   CR  --   --   --   --  0.59  --  0.58  --   --   --  0.61   < > 0.57   ANIONGAP  --   --   --   --  11  --  8  --   --   --  12   < > 9   JUAN DIEGO  --   --   --   --  8.7*  --  8.5*  --   --   --  9.0   < > 9.2   *  --  171* 201* 243*   < > 192*   < >  --    < > 196*   < > 122*   ALBUMIN  --   --   --   --   --   --   --   --   --   --  3.8  --  3.3*   PROTTOTAL  --   --   --   --   --   --   --   --   --   --  7.1  --  6.2*   BILITOTAL  --   --   --   --   --   --   --   --   --   --  0.3  --  0.2   ALKPHOS  --   --   --   --   --   --   --   --   --   --  107  --  88   ALT  --   --   --   --   --   --   --   --   --   --  34  --  29   AST  --   --   --   --   --   --   --   --   --   --  23  --  36    < > = values in this interval not displayed.

## 2025-01-09 NOTE — PLAN OF CARE
1/8/2025 [id number]    Orientation: A&Ox4; cooperative  Aggression Stop Light: Green  Activity: Independent; ambulated several times in the hallways this shift  Diet/BS Checks: Clear liquid diet; tolerating without nausea or abdominal pain per patient report.     Tele:  N/A  IV Access/Drains: New PIV started this shift; old one painful and leaking.  NGT clamped  Pain Management: Denies pain  Abnormal VS/Results: VSS on RA ex HTN and tachycardic  Bowel/Bladder: continent of bowel/bladder; per patient report, one small BM, bowel sounds hypoactive  Skin/Wounds: abdominal bruising; 3 lap sites open to air  Consults: surgery  D/C Disposition: pending improvement/progress    Other Info:   - K+ protocol, recheck tomorrow morning

## 2025-01-09 NOTE — PROGRESS NOTES
"[facility]    Medicine Progress Note - Hospitalist Service    Date of Admission:  1/2/2025    Assessment & Plan    Monica is a 59-year-old female with a history of type 2 diabetes mellitus, hypertension, hyperlipidemia and recent laparoscopic appendectomy 12/22/2024 who was admitted to [facility] on 1/2/2025 for acute abdominal pain/distention secondary to ileus versus distal SBO.  If tolerating regular diet, can discharge as early as 1/6/2025    Postop ileus versus early small bowel obstruction in setting of recent laparoscopic appendectomy on 12/22/2024  CT AP W1/2/25: Ileus versus partial distal SBO with transition point terminal ileum  Also appreciate Dr. Contreras's care, repeat CT scan of the abdomen pelvis 1/6 shows, \"mild diffuse gas and fluid distention of the small and large bowel consistent with ileus.\"  she also ordered abdominal ultrasound labs, n.p.o.  She continues, \"paged again by the nurse because patient continues to complain of abdominal distention nausea and abdominal pain; not passing gas as per RN; NGT to LIS ordered.\"  General surgery consult requested, I appreciate Darinel Ibarra and Dr. Styles's follow up  Per gen surg, \"Ambulate, she likely has this condition from lack of moving around.   Gallbladder thickening likely not clinically significant.  Taking it out laparoscopically would not be technically feasible anyway right now given the amount of colonic distention she has.\"  And on 1/9:  \" Removed NG.  Advance to fulls.  If tolerating and doing okay, advance to soft diet this evening.  Avoid narcotics.  Anticipate discharge tomorrow.\"    Diabetes mellitus type 2 complicated by neuropathy  PTA insulin glargine 46 units at bedtime, Ozempic  Hold PTA Ozempic  Medium scale corrective dose  Resume PTA gabapentin 600 mg p.o. 3 times daily when tolerating oral intake  1/8: Blood sugar readings are close to goal  1/9: Resume Lantus since diet will likely be advanced this evening, " "decreased dose to 15 units at bedtime    Anasarca  Unclear etiology  Reports history of fatty liver but no evidence of liver cirrhosis on her recent CT abdomen scans.  Does have some chronic lower extremity edema for which she takes Lasix  No history of CHF, recent echocardiogram on 12/31/24 showed normal ejection fraction  she was advised to hold Actos for now and follow-up with PCP  As above, treated with IVF due to n.p.o. with significant NG output    Hyponatremia - resolved  Hypertension - Resume PTA enalapril when taking oral  Dyslipidemia - hold PTA statin given n.p.o. status  Schizophrenia - Resume PTA Zyprexa, trazodone, Effexor and Clozaril when taking oral  Bipolar disorder   Insomnia    Diet: Diet  Clear Liquid Diet    DVT Prophylaxis: Pneumatic Compression Devices  Yi Catheter: Not present  Lines: None     Cardiac Monitoring: None  Code Status: Full Code      Disposition Plan     Medically Ready for Discharge: Anticipate discharge tomorrow    Maggy Duron MD  Hospitalist Service  [facility]  Securely message with [employer] (more info)  Text page via [employer] Paging/Directory   ______________________________________________________________________    Interval History   \"I am so much better now that that tube is out.\"  Patient says she has much less discomfort in her throat since the NG tube has been removed.  She says that Dr. Rocha told her to avoid carbonated drinks.  She says she is passing gas, has had several small bowel movements.  She has no respiratory complaints.    Physical Exam   Vital Signs: Temp: 98.3  F (36.8  C) Temp src: Oral BP: (!) 166/83 Pulse: 87   Resp: 16 SpO2: 95 % O2 Device: None (Room air)    Weight: 301 lbs 6.4 oz    Constitutional: Awake, alert  Respiratory: Clear to auscultation bilaterally, no crackles or wheezing  Cardiovascular:  regular rate and rhythm  GI: Abdomen is obese, but seems less distended.  Bowel sounds are present and abdomen seems " nontender  Skin/Integumen: Redness on the right hand has resolved  Other: Mood seems less anxious      Medical Decision Making       35 MINUTES SPENT BY ME on the date of service doing chart review, history, exam, documentation & further activities per the note.   Including discussion with patient, her  and bedside RN    Data   ------------------------- PAST 24 HR DATA REVIEWED -----------------------------------------------

## 2025-01-09 NOTE — PROGRESS NOTES
01/08/25 1038   Appointment Info   Signing Clinician's Name / Credentials (PT) Salvador Del Toro PT, DPT   Living Environment   People in Home spouse   Current Living Arrangements apartment   Home Accessibility stairs to enter home   Number of Stairs, Main Entrance greater than 10 stairs  (3 flights of 7 stairs per flight)   Stair Railings, Main Entrance railings on both sides of stairs   Transportation Anticipated family or friend will provide   Living Environment Comments patient lives in apartment with spouse, 3 flights of 7 steps per flight with bilateral handrails.   Self-Care   Usual Activity Tolerance moderate   Current Activity Tolerance moderate   Equipment Currently Used at Home none   Fall history within last six months no   Activity/Exercise/Self-Care Comment Patient IND for mobility and cares at baseline with no assistive device. Spouse helps with groceries and donning/doffing socks/shoes at baseline.   General Information   Onset of Illness/Injury or Date of Surgery 01/02/25   Referring Physician Leonidas Styles MD   Patient/Family Therapy Goals Statement (PT) Patient would like to go home   Pertinent History of Current Problem (include personal factors and/or comorbidities that impact the POC) 59-year-old female with a history of type 2 diabetes mellitus, hypertension, hyperlipidemia and recent laparoscopic appendectomy 12/22/2024 who was admitted to [facility] on 1/2/2025 for acute abdominal pain/distention secondary to ileus versus distal SBO.  If tolerating regular diet, can discharge as early as 1/6/2025   Existing Precautions/Restrictions fall   Cognition   Affect/Mental Status (Cognition) WFL   Orientation Status (Cognition) oriented x 4   Follows Commands (Cognition) WFL   Pain Assessment   Patient Currently in Pain Yes, see Vital Sign flowsheet  (low back pain with prolonged activity)   Strength (Manual Muscle Testing)   Strength (Manual Muscle Testing) Deficits observed during functional mobility    Bed Mobility   Comment, (Bed Mobility) not assessed, mod I baseline   Transfers   Comment, (Transfers) sit<>stands with mod I   Gait/Stairs (Locomotion)   Comment, (Gait/Stairs) ambulation with unilateral UE support on IV pole   Balance   Balance Comments no balance impairments evident with ambulation and stair navigation   Sensory Examination   Sensory Perception Comments reports diabetic neuropathy, numbness and tingling in bilateral feet   Clinical Impression   Criteria for Skilled Therapeutic Intervention Yes, treatment indicated   PT Diagnosis (PT) impaired mobility   Influenced by the following impairments weakness, reduced activity tolerance, pain   Functional limitations due to impairments impaired functional mobility, impaired gait, transfers, stair navigation   Clinical Presentation (PT Evaluation Complexity) stable   Clinical Presentation Rationale clinical judgment   Clinical Decision Making (Complexity) low complexity   Planned Therapy Interventions (PT) balance training;bed mobility training;gait training;patient/family education;stair training;strengthening;transfer training;progressive activity/exercise   Risk & Benefits of therapy have been explained evaluation/treatment results reviewed;care plan/treatment goals reviewed;risks/benefits reviewed;current/potential barriers reviewed;participants voiced agreement with care plan;participants included;patient;spouse/significant other   PT Total Evaluation Time   PT Eval, Low Complexity Minutes (70907) 6   Physical Therapy Goals   PT Frequency One time eval and treatment only   PT Predicted Duration/Target Date for Goal Attainment 01/09/25   PT Goals Bed Mobility;Transfers;Gait;Stairs   PT: Bed Mobility Modified independent;Supine to/from sit   PT: Transfers Modified independent;Sit to/from stand;Assistive device   PT: Gait Modified independent;Greater than 200 feet   PT: Stairs Modified independent;Greater than 10 stairs;Rail on both sides    Interventions   Interventions Quick Adds Gait Training;Therapeutic Activity   Therapeutic Activity   Therapeutic Activities: dynamic activities to improve functional performance Minutes (84244) 9   Symptoms Noted During/After Treatment Fatigue   Treatment Detail/Skilled Intervention Patient ambulating in hallway with spouse at beginning of session, agreeable to participate in PT. Patient performing sit<>stands during session independently. Patient requested donning shoes prior to stair navigation. Required assist for doffing socks, donning new socks, and donning shoes. Discussed use of sock aide and shoe horn. Patient reported that spouse assists with donning/doffing shoes and socks. Patient reported that her daughter is an OT and that she will ask about use of adaptive equipment. Patient asking questions at end of session about recommendations for mobilty upon discharge. Patient stated that she will return to OP PT and chiropractor to address back pain. All needs within reach at end of session.   Gait Training   Gait Training Minutes (29185) 11   Symptoms Noted During/After Treatment (Gait Training) fatigue   Treatment Detail/Skilled Intervention Patient completed bout of ambulation for >300' with unilateral UE support on IV pole and initial supervision, progressed to mod I. Ambulating 50' independently with no assistive device. Demonstrating decreased step length and gait velocity, but no evident instability or overt LOB throughout. Patient completed bout of stair navigation x12 steps with BUE support on handrails and initial supervision, progressed to mod I. Cues for sequencing. Using both step to and reciprocal pattern intermittently while navigating stairs. No instability or overt LOB throughout.   Distance in Feet >300', 12 stairs   PT Discharge Planning   PT Plan discharge from IP PT   PT Discharge Recommendation (DC Rec) home with assist   PT Rationale for DC Rec Patient near baseline functional mobility.  Lives with spouse in apartment, 3 flights to reach apartment with handrails. Mobilizing with modified independence in the hospital, no further IP PT needs. Recommend discharge home with assist for IADL's and donning/doffing shoes/socks from spouse. Recommend cane as needed for initial ambulation.   PT Brief overview of current status mod I for mobility with UE support on IV pole. Goals of therapy will be to address safe mobility and make recs for d/c to next level of care. Pt and RN will continue to follow all falls risk precautions as documented by RN staff while hospitalized.   PT Total Distance Amb During Session (feet) 300   PT Equipment Needed at Discharge cane, straight  (patient stated spouse got her a cane)   Physical Therapy Time and Intention   Timed Code Treatment Minutes 20   Total Session Time (sum of timed and untimed services) 26

## 2025-01-09 NOTE — PLAN OF CARE
Physical Therapy Discharge Summary    Reason for therapy discharge:    All goals and outcomes met, no further needs identified.    Progress towards therapy goal(s). See goals on Care Plan in [facility] electronic health record for goal details.  Goals met    Therapy recommendation(s):    Continue mobilizing with nursing staff 3-5x/day while in hospital.

## 2025-01-09 NOTE — PROGRESS NOTES
[id number]    Rested well overnight. Lots of clear liquid intake this shift, tolerating well. Passing gas, and pt says she had a BM (loose). Denies nausea. NG clamped, pt hoping to get this removed today. Denies pain. Ambulating in halls independently. VSS on room air. Old lap sites CDI, open to air.

## 2025-01-09 NOTE — PLAN OF CARE
Orientation: A&Ox4  Aggression Stop Light: Green  Activity: Ind in room. Ambulated in [place] throughout the day.   Diet/BS Checks: Clear liquid diet started at 1630. Pt tolerated being clamped 6 hrs prior. No complaints of nausea since started on clears  Tele:  N/A  IV Access/Drains: L PIV SL - dressing changed, blood return noted. Pt refusing meds to be given d/t it hurting.  - NGT clamped  Pain Management: Denies pain  Abnormal VS/Results: VSS on RA ex HTN at times  Bowel/Bladder: continent of B/B. Bowel sounds hypoactive and faint. No BM this shift.   Skin/Wounds: abdominal bruising. 3x abdominal lap sites WDL. Rash to R hand  Consults: surgery  D/C Disposition: pending continued improvement    Other Info:   - no complaints of N/V this shift  - prn lozenge given x3 & chloraseptic spray used periodically  - took many long walks in [place] this shift

## 2025-01-10 VITALS
HEART RATE: 81 BPM | HEIGHT: 65 IN | RESPIRATION RATE: 17 BRPM | DIASTOLIC BLOOD PRESSURE: 80 MMHG | TEMPERATURE: 97.4 F | WEIGHT: 293 LBS | SYSTOLIC BLOOD PRESSURE: 152 MMHG | BODY MASS INDEX: 48.82 KG/M2 | OXYGEN SATURATION: 96 %

## 2025-01-10 LAB
GLUCOSE BLDC GLUCOMTR-MCNC: 172 MG/DL (ref 70–99)
GLUCOSE BLDC GLUCOMTR-MCNC: 188 MG/DL (ref 70–99)
GLUCOSE BLDC GLUCOMTR-MCNC: 205 MG/DL (ref 70–99)
GLUCOSE BLDC GLUCOMTR-MCNC: 212 MG/DL (ref 70–99)
GLUCOSE BLDC GLUCOMTR-MCNC: 226 MG/DL (ref 70–99)
POTASSIUM SERPL-SCNC: 3.9 MMOL/L (ref 3.4–5.3)

## 2025-01-10 PROCEDURE — 250N000011 HC RX IP 250 OP 636: Performed by: INTERNAL MEDICINE

## 2025-01-10 PROCEDURE — 250N000013 HC RX MED GY IP 250 OP 250 PS 637: Performed by: INTERNAL MEDICINE

## 2025-01-10 PROCEDURE — 36415 COLL VENOUS BLD VENIPUNCTURE: CPT | Performed by: INTERNAL MEDICINE

## 2025-01-10 PROCEDURE — 84132 ASSAY OF SERUM POTASSIUM: CPT | Performed by: INTERNAL MEDICINE

## 2025-01-10 PROCEDURE — 250N000013 HC RX MED GY IP 250 OP 250 PS 637: Performed by: SURGERY

## 2025-01-10 PROCEDURE — 99239 HOSP IP/OBS DSCHRG MGMT >30: CPT | Performed by: INTERNAL MEDICINE

## 2025-01-10 RX ORDER — FAMOTIDINE 20 MG/1
20 TABLET, FILM COATED ORAL 2 TIMES DAILY
Qty: 60 TABLET | Refills: 0 | Status: SHIPPED | OUTPATIENT
Start: 2025-01-10

## 2025-01-10 RX ORDER — FERROUS SULFATE 325(65) MG
325 TABLET ORAL AT BEDTIME
Status: SHIPPED
Start: 2025-01-10

## 2025-01-10 RX ORDER — POLYETHYLENE GLYCOL 3350 17 G/17G
17 POWDER, FOR SOLUTION ORAL DAILY
Qty: 510 G | Refills: 0 | Status: SHIPPED | OUTPATIENT
Start: 2025-01-10

## 2025-01-10 RX ORDER — AMOXICILLIN 250 MG
1 CAPSULE ORAL 2 TIMES DAILY
Qty: 30 TABLET | Refills: 0 | Status: SHIPPED | OUTPATIENT
Start: 2025-01-10

## 2025-01-10 RX ADMIN — ACETAMINOPHEN 650 MG: 325 TABLET, FILM COATED ORAL at 14:59

## 2025-01-10 RX ADMIN — GABAPENTIN 600 MG: 600 TABLET, FILM COATED ORAL at 08:15

## 2025-01-10 RX ADMIN — FUROSEMIDE 20 MG: 20 TABLET ORAL at 08:15

## 2025-01-10 RX ADMIN — MAGNESIUM HYDROXIDE 30 ML: 400 SUSPENSION ORAL at 08:15

## 2025-01-10 RX ADMIN — ENALAPRIL MALEATE 2.5 MG: 2.5 TABLET ORAL at 08:14

## 2025-01-10 RX ADMIN — HYDROCORTISONE: 10 CREAM TOPICAL at 08:23

## 2025-01-10 RX ADMIN — GABAPENTIN 600 MG: 600 TABLET, FILM COATED ORAL at 14:52

## 2025-01-10 RX ADMIN — FUROSEMIDE 20 MG: 20 TABLET ORAL at 15:00

## 2025-01-10 RX ADMIN — FAMOTIDINE 20 MG: 10 INJECTION, SOLUTION INTRAVENOUS at 05:14

## 2025-01-10 ASSESSMENT — ACTIVITIES OF DAILY LIVING (ADL)
ADLS_ACUITY_SCORE: 34
ADLS_ACUITY_SCORE: 34
ADLS_ACUITY_SCORE: 30
ADLS_ACUITY_SCORE: 30
ADLS_ACUITY_SCORE: 34
ADLS_ACUITY_SCORE: 30
ADLS_ACUITY_SCORE: 34
ADLS_ACUITY_SCORE: 34
ADLS_ACUITY_SCORE: 30
ADLS_ACUITY_SCORE: 31
ADLS_ACUITY_SCORE: 31
ADLS_ACUITY_SCORE: 30
ADLS_ACUITY_SCORE: 30
ADLS_ACUITY_SCORE: 31
ADLS_ACUITY_SCORE: 34
ADLS_ACUITY_SCORE: 31
ADLS_ACUITY_SCORE: 30
ADLS_ACUITY_SCORE: 34

## 2025-01-10 NOTE — PLAN OF CARE
Orientation: A/Ox4  Aggression Stop Light: Green  Activity: Independent  Diet/BS Checks: Low Fiber  Tele:  N/A  IV Access/Drains: PIV-SL  Pain Management: Denies pain or N/V  Abnormal VS/Results: VSS ex HTN. On RA.  Bowel/Bladder: Continent B/B.  Skin/Wounds: Scattered bruising, 3 Lap sites (open to air-CDI), Rash on L hand, and blister on foot.  Consults: Gen Surg  D/C Disposition:  Home today  Other Info: On K+ protocol-no replacements needed today-AM re-check.      Discharge Note    Patient discharged to home via private vehicle  accompanied by significant other .  IV: Discontinued  Prescriptions filled and given to patient/family.   Belongings reviewed and sent with patient and family.   Home medications returned to patient: NA  Equipment sent with: N/A.   patient and family verbalizes understanding of discharge instructions. AVS given to patient and family.

## 2025-01-10 NOTE — DISCHARGE SUMMARY
"[facility]  Hospitalist Discharge Summary      Date of Admission:  1/2/2025  Date of Discharge:  1/10/2025  Discharging Provider: Maggy Duron MD  Discharge Service: Hospitalist Service    Discharge Diagnoses   Postop ileus in setting of recent laparoscopic appendectomy on 12/22/2024   Type 2 diabetes mellitus complicated by neuropathy  Anasarca  Hyponatremia  Hypertension  Dyslipidemia  Schizophrenia  Bipolar disorder  Insomnia    Clinically Significant Risk Factors     # DMII: A1C = 7.3 % (Ref range: <5.7 %) within past 6 months  # Severe Obesity: Estimated body mass index is 50.16 kg/m  as calculated from the following:    Height as of this encounter: 1.651 m (5' 5\").    Weight as of this encounter: 136.7 kg (301 lb 6.4 oz).       Follow-ups Needed After Discharge   Follow-up Appointments       Follow Up      Follow up with Dr Juarez Rocha of general surgery for ongoing management of ileus. Their clinic will contact patient for appt date        Follow Up      Follow up with Dr. Rocha in 2 weeks. Call [phone] to schedule an appointment or if you have any concerns. We are located at [address] [place] [address].        Hospital Follow-up with Existing Primary Care Provider (PCP)      Please see details below         Schedule Primary Care visit within: 14 Days   Recommended labs and Imaging (to be ordered by Primary Care Provider): BMP, Mg               Unresulted Labs Ordered in the Past 30 Days of this Admission       No orders found from 12/3/2024 to 1/3/2025.            Discharge Disposition   Discharged to home  Condition at discharge: Stable    Hospital Course   Monica is a 59-year-old female with a history of type 2 diabetes mellitus, hypertension, hyperlipidemia and recent laparoscopic appendectomy 12/22/2024 who was admitted to [facility] on 1/2/2025 for acute abdominal pain/distention secondary to ileus versus distal SBO.     Postop ileus in setting of " "recent laparoscopic appendectomy on 12/22/2024  CT AP W1/2/25: Ileus versus partial distal SBO with transition point terminal ileum  Also appreciate Dr. Contreras's care, repeat CT scan of the abdomen pelvis 1/6 shows, \"mild diffuse gas and fluid distention of the small and large bowel consistent with ileus.\"  she also ordered abdominal ultrasound labs, n.p.o.  She continues, \"paged again by the nurse because patient continues to complain of abdominal distention nausea and abdominal pain; not passing gas as per RN; NGT to LIS ordered.\"  General surgery consult requested, I appreciate Darinel Ibarra and Dr. Styles's follow up  Per gen surg, \"Ambulate, she likely has this condition from lack of moving around.   Gallbladder thickening likely not clinically significant.  Taking it out laparoscopically would not be technically feasible anyway right now given the amount of colonic distention she has.\"  And on 1/9:  \" Removed NG.  Advance to fulls.  If tolerating and doing okay, advance to soft diet this evening.  Avoid narcotics.  Anticipate discharge tomorrow.\"  1/10:  1) Ok for discharge from surgery's perspective  2) Would send home with miralax and senna.    Diabetes mellitus type 2 complicated by neuropathy  PTA insulin glargine 46 units at bedtime, Ozempic  Hold PTA Ozempic  Medium scale corrective dose  Resume PTA gabapentin 600 mg p.o. 3 times daily when tolerating oral intake  1/8: Blood sugar readings are close to goal  1/9: Resume Lantus     Anasarca  Unclear etiology  Reports history of fatty liver but no evidence of liver cirrhosis on her recent CT abdomen scans.  Does have some chronic lower extremity edema for which she takes Lasix  No history of CHF, recent echocardiogram on 12/31/24 showed normal ejection fraction  she was advised to hold Actos for now and follow-up with PCP  As above, treated with IVF due to n.p.o. with significant NG output    Hyponatremia - resolved  Hypertension - Resume PTA enalapril "   Dyslipidemia - resume PTA statin   Schizophrenia - Resume PTA Zyprexa, trazodone, Effexor and Clozaril  Bipolar disorder   Insomnia    Diet: Diet  Clear Liquid Diet    DVT Prophylaxis: Pneumatic Compression Devices  Yi Catheter: Not present  Lines: None     Cardiac Monitoring: None  Code Status: Full Code      Consultations This Hospital Stay   SURGERY GENERAL IP CONSULT  PHYSICAL THERAPY ADULT IP CONSULT  VASCULAR ACCESS ADULT IP CONSULT    Code Status   Full Code    Time Spent on this Encounter   I, Maggy Duron MD, personally saw the patient today and spent greater than 30 minutes discharging this patient.       Maggy Duron MD  [place] [address] [facility]  [address] [place], [place] [address]  [facility] [address]  Phone: [phone]  ______________________________________________________________________    Physical Exam   Vital Signs: Temp: 98.2  F (36.8  C) Temp src: Oral BP: 156/81 Pulse: 88   Resp: 16 SpO2: 96 % O2 Device: None (Room air)    Weight: 301 lbs 6.4 oz  I saw and examined the patient on the date of discharge.         Primary Care Physician   Shiloh Reyes    Discharge Orders      Primary Care - Care Coordination Referral      Reason for your hospital stay    Abdominal pain and distension     Activity    Your activity upon discharge: activity as tolerated     Follow Up    Follow up with Dr Juarez Rocha of general surgery for ongoing management of ileus. Their clinic will contact patient for appt date     Follow Up    Follow up with Dr. Rocha in 2 weeks. Call [phone] to schedule an appointment or if you have any concerns. We are located at [address] [facility] [address], [place] [address].     Full Code     Diet    Follow this diet upon discharge: Regular diet     Diet    Follow this diet upon discharge: low residue diet x 1 week, small frequent meals. Avoid heavy, spicy, and greasy meals for 2-3 days. Drink plenty of fluids to stay hydrated.     Hospital Follow-up with  Existing Primary Care Provider (PCP)    Please see details below            Significant Results and Procedures   Most Recent 3 CBC's:  Recent Labs   Lab Test 01/08/25  1931 01/07/25  1235 01/06/25  0732   WBC 9.9 10.2 18.5*   HGB 11.9 11.1* 13.2   MCV 86 85 86   * 447 487*     Most Recent 3 BMP's:  Recent Labs   Lab Test 01/10/25  1202 01/10/25  1000 01/10/25  0732 01/10/25  0201 01/09/25  0757 01/09/25  0548 01/08/25  2141 01/08/25  1931 01/07/25  2140 01/07/25  1628 01/06/25  1220 01/06/25  0732   NA  --   --   --   --   --   --   --  142  --  137  --  131*   POTASSIUM  --  3.9  --   --   --  4.0  --  3.9  --  3.8  --  3.7   CHLORIDE  --   --   --   --   --   --   --  105  --  100  --  92*   CO2  --   --   --   --   --   --   --  26  --  29  --  27   BUN  --   --   --   --   --   --   --  8.5  --  9.1  --  7.6   CR  --   --   --   --   --   --   --  0.59  --  0.58  --  0.61   ANIONGAP  --   --   --   --   --   --   --  11  --  8  --  12   JUAND IEGO  --   --   --   --   --   --   --  8.7*  --  8.5*  --  9.0   *  --  188* 172*   < >  --    < > 243*   < > 192*   < > 196*    < > = values in this interval not displayed.     Most Recent 2 LFT's:  Recent Labs   Lab Test 01/06/25  0732 01/02/25  2257   AST 23 36   ALT 34 29   ALKPHOS 107 88   BILITOTAL 0.3 0.2   ,   Results for orders placed or performed during the hospital encounter of 01/02/25   CT Abdomen Pelvis w Contrast    Narrative    EXAM: CT ABDOMEN PELVIS W CONTRAST  LOCATION: [facility]  DATE: 1/6/2025    INDICATION: worsening abd distention; admitted with ileus vs early SBO  COMPARISON: January 2, 2025  TECHNIQUE: CT scan of the abdomen and pelvis was performed following injection of IV contrast. Multiplanar reformats were obtained. Dose reduction techniques were used.  CONTRAST: 135mL Isovue 370    FINDINGS:   LOWER CHEST: Mild tree-in-bud nodularity in the right lower lobe.    HEPATOBILIARY: New gallbladder wall edema.  Unremarkable liver.    PANCREAS: Normal.    SPLEEN: Normal.    ADRENAL GLANDS: Normal.    KIDNEYS/BLADDER: Normal.    BOWEL: Mild diffuse gas and fluid distention of the small and large bowel consistent with ileus. Trace ascites.    LYMPH NODES: Normal.    VASCULATURE: Normal.    PELVIC ORGANS: Pedunculated fibroid at the uterine fundus.    MUSCULOSKELETAL: Unremarkable      Impression    IMPRESSION:   1.  Mild diffuse gas and fluid distention of the small and large bowel consistent with ileus.    2.  New gallbladder wall edema.   US Abdomen Limited    Narrative    EXAM: US ABDOMEN LIMITED  LOCATION: [facility]  DATE: 1/6/2025    INDICATION: Gallbladder wall edema on CT abd.  COMPARISON: CT 1/6/2026.  TECHNIQUE: Limited abdominal ultrasound.    FINDINGS:    GALLBLADDER: Negative sonographic Frost's sign. Several layering gallstones. Gallbladder wall is thickened measuring 5 mm.    BILE DUCTS: No biliary dilatation. The common duct measures 3 mm.    LIVER: Normal parenchyma with smooth contour. No focal mass.    RIGHT KIDNEY: No hydronephrosis.    PANCREAS: Obscured from visualization.    No ascites.      Impression    IMPRESSION:  1.  Diffuse gallbladder wall edema measuring approximately 5 mm. Cholelithiasis also noted. Negative sonographic Frost's sign. No biliary ductal dilatation.       XR Gastrografin  Challenge    Narrative    EXAM: XR GASTROGRAFIN CHALLENGE  LOCATION: [facility]  DATE: 1/7/2025    INDICATION: Gastrografin follow through (GG ordered to be given at 0700).  COMPARISON: CT 1/6/2025, Gastrografin challenge 1/1/2025.  TECHNIQUE: Routine water soluble contrast follow-through challenge.      Impression    IMPRESSION:  1.  Water soluble contrast material IS identified within the proximal colon 8 hours post administration.  2.  Multiple dilated small bowel and large bowel loops again identified consistent with a diffuse severe ileus. Nasogastric  tube identified within the stomach.       Discharge Medications   Current Discharge Medication List        START taking these medications    Details   famotidine (PEPCID) 20 MG tablet Take 1 tablet (20 mg) by mouth 2 times daily.  Qty: 60 tablet, Refills: 0    Associated Diagnoses: Ileus (H)      polyethylene glycol (MIRALAX) 17 GM/Dose powder Take 17 g by mouth daily.  Qty: 510 g, Refills: 0    Associated Diagnoses: Ileus (H)      senna-docusate (SENOKOT-S/PERICOLACE) 8.6-50 MG tablet Take 1 tablet by mouth 2 times daily.  Qty: 30 tablet, Refills: 0    Associated Diagnoses: Ileus (H)           CONTINUE these medications which have CHANGED    Details   ferrous sulfate (FEROSUL) 325 (65 Fe) MG tablet Take 1 tablet (325 mg) by mouth at bedtime.    Associated Diagnoses: Iron deficiency anemia, unspecified iron deficiency anemia type           CONTINUE these medications which have NOT CHANGED    Details   acetaminophen (TYLENOL) 500 MG tablet Take 500-1,000 mg by mouth every 6 hours as needed for mild pain      aspirin 81 MG EC tablet Take 81 mg by mouth at bedtime.      atorvastatin (LIPITOR) 10 MG tablet Take 10 mg by mouth at bedtime.      calcium carbonate (TUMS) 500 MG chewable tablet Take 1 chew tab by mouth 3 times daily as needed for heartburn.      cholecalciferol (VITAMIN D3) 1250 mcg (21167 units) capsule Take 50,000 Units by mouth once a week. On Fridays      cloZAPine (CLOZARIL) 25 MG tablet Take 1 tablet (25 mg) by mouth at bedtime. Please take Clozapine 12.5 mg at bedtime on 01/01/25 and 01/02/25, then resume your usual 25 mg at bedtime.    Associated Diagnoses: Schizophrenia, unspecified type (H)      enalapril (VASOTEC) 2.5 MG tablet Take 2.5 mg by mouth every morning.      furosemide (LASIX) 20 MG tablet Take 1 tablet (20 mg) by mouth 2 times daily. Do not take if you have significant diarrhea.    Associated Diagnoses: Anasarca      gabapentin (NEURONTIN) 600 MG tablet Take 600 mg by mouth 3 times  daily       insulin glargine 100 UNIT/ML pen Inject 46 Units subcutaneously at bedtime.      OLANZapine (ZYPREXA) 15 MG tablet Take 15 mg by mouth At Bedtime      Semaglutide, 1 MG/DOSE, (OZEMPIC) 4 MG/3ML pen Inject 1 mg subcutaneously every 7 days. On Fridays      simethicone (MYLICON) 80 MG chewable tablet Take  mg by mouth every 6 hours as needed for flatulence or cramping.      traZODone (DESYREL) 100 MG tablet Take 100 mg by mouth at bedtime.      venlafaxine (EFFEXOR-XR) 150 MG 24 hr capsule Take 150 mg by mouth at bedtime.      Blood Glucose Monitoring Suppl (BLOOD GLUCOSE MONITOR SYSTEM) w/Device KIT            Allergies   Allergies   Allergen Reactions    Other Environmental Allergy Unknown     pineapple    Penicillins Rash

## 2025-01-10 NOTE — PROGRESS NOTES
"[facility]  General Surgery Progress Note        Leonidas Styles MD   01/10/2025        Interval History:      1) tolerating regular diet, having bowel movements         Assessment and Plan:      1) Ok for discharge from surgery's perspective  2) Would send home with miralax and senna.                   Physical Exam:      Blood pressure 156/81, pulse 88, temperature 98.2  F (36.8  C), temperature source Oral, resp. rate 16, height 1.651 m (5' 5\"), weight 136.7 kg (301 lb 6.4 oz), SpO2 96%.  Vitals:    01/03/25 1546   Weight: 136.7 kg (301 lb 6.4 oz)     Vital Signs with Ranges  Temp:  [97.2  F (36.2  C)-98.3  F (36.8  C)] 98.2  F (36.8  C)  Pulse:  [] 88  Resp:  [16-20] 16  BP: (132-172)/(74-84) 156/81  SpO2:  [96 %-98 %] 96 %  I/O's Last 24 hours  No intake/output data recorded.    Still distended. Non tender.              Medications:        Current Facility-Administered Medications   Medication Dose Route Frequency Provider Last Rate Last Admin    cloZAPine (CLOZARIL) tablet 25 mg  25 mg Oral At Bedtime Belgica Contreras MD   25 mg at 01/09/25 2129    enalapril (VASOTEC) tablet 2.5 mg  2.5 mg Oral QAM Maggy Duron MD   2.5 mg at 01/10/25 0814    famotidine (PEPCID) injection 20 mg  20 mg Intravenous Q12H Belgica Contreras MD   20 mg at 01/10/25 0514    furosemide (LASIX) tablet 20 mg  20 mg Oral BID Maggy Duron MD   20 mg at 01/10/25 0815    gabapentin (NEURONTIN) tablet 600 mg  600 mg Oral TID Belgica Contreras MD   600 mg at 01/10/25 0815    hydrocortisone (CORTAID) 1 % cream   Topical BID Maggy Duron MD   Given at 01/10/25 0823    insulin aspart (NovoLOG) injection (RAPID ACTING)  1-7 Units Subcutaneous TID AC Maggy Duron MD   2 Units at 01/10/25 1311    insulin aspart (NovoLOG) injection (RAPID ACTING)  1-5 Units Subcutaneous At Bedtime Maggy Duron MD   1 Units at 01/08/25 2226    insulin glargine (LANTUS PEN) injection 15 Units "  15 Units Subcutaneous At Bedtime Maggy Duron MD   15 Units at 01/09/25 2131    magnesium hydroxide (MILK OF MAGNESIA) suspension 30 mL  30 mL Oral Daily Jc Rushing MD   30 mL at 01/10/25 0815    OLANZapine (zyPREXA) tablet 15 mg  15 mg Oral At Bedtime Belgica Contreras MD   15 mg at 01/09/25 2129    [Held by provider] polyethylene glycol (MIRALAX) Packet 17 g  17 g Oral BID Maggy Duron MD   17 g at 01/06/25 0812    [Held by provider] senna-docusate (SENOKOT-S/PERICOLACE) 8.6-50 MG per tablet 1 tablet  1 tablet Oral BID Maggy Duron MD   1 tablet at 01/06/25 0812    sodium chloride (PF) 0.9% PF flush 3 mL  3 mL Intracatheter Q8H Belgica Contreras MD   3 mL at 01/10/25 1313    traZODone (DESYREL) tablet 100 mg  100 mg Oral At Bedtime Belgica Contreras MD   100 mg at 01/09/25 2130    venlafaxine (EFFEXOR XR) 24 hr capsule 150 mg  150 mg Oral At Bedtime Belgica Contreras MD   150 mg at 01/09/25 2129            Data:      All new lab and imaging data was reviewed.   Recent Labs   Lab Test 01/08/25  1931 01/07/25  1235 01/06/25  0732 12/27/20  0729 12/27/20  0728 12/26/20  0716 12/25/20  0640   WBC 9.9 10.2 18.5*   < >  --  5.5 5.0   HGB 11.9 11.1* 13.2   < >  --  12.6 12.1   MCV 86 85 86   < >  --  82 82   * 447 487*   < >  --  353 329   INR  --   --   --   --  1.09 1.13* 1.11*    < > = values in this interval not displayed.

## 2025-01-10 NOTE — PLAN OF CARE
Orientation: A&Ox4; cooperative  Aggression Stop Light: Green  Activity: Independent; ambulating frequently in [place] throughout shift  Diet/BS Checks:Advanced to full liquids this morning, tolerated breakfast & lunch. Advanced diet to low fiber this evening.    Tele:  N/A  IV Access/Drains: R PIV SL. NG removed.   Pain Management: Denies pain  Abnormal VS/Results: VSS on RA ex HTN.  Bowel/Bladder: continent of B/B. Pt reports a few small liquid BM this shift. Passing gas.   Skin/Wounds: abdominal bruising; 3 lap sites open to air  Consults: surgery  D/C Disposition: likely tomorrow if tolerates diet tonight    Other Info:   - K+ protocol WNL, recheck in AM  - pt has blister on R foot, likely from walking frequently in [place] with socks. Mepi applied

## 2025-01-10 NOTE — PLAN OF CARE
1/9/2025 1900 - 1/10/2025 @ 0730     Orientation: A&Ox4; cooperative  Aggression Stop Light: Green  Activity: Independent; ambulated several times in the hallways this shift  Diet/BS Checks: Low fiber diet;  tolerating without reports of abdominal pain, distention or nausea.  BG checks: no sliding scale insulin needed this shift; Lantus given as ordered  Tele:  N/A  IV Access/Drains: PIV SL  Pain Management: Denies pain  Abnormal VS/Results: VSS on RA ex HTN and tachycardic at times  Bowel/Bladder: continent of bowel/bladder; several small loose bowel movements this shift.  BS active; passing flatus per patient report  Skin/Wounds: abdominal bruising; 3 lap sites open to air  Consults: surgery  D/C Disposition: probably home today     Other Info:   - K+ protocol, recheck tomorrow morning

## 2025-01-15 ENCOUNTER — LAB REQUISITION (OUTPATIENT)
Dept: LAB | Facility: CLINIC | Age: 60
End: 2025-01-15
Payer: MEDICARE

## 2025-01-15 DIAGNOSIS — E11.29 TYPE 2 DIABETES MELLITUS WITH OTHER DIABETIC KIDNEY COMPLICATION (H): ICD-10-CM

## 2025-01-15 PROCEDURE — 80048 BASIC METABOLIC PNL TOTAL CA: CPT | Mod: ORL

## 2025-01-15 PROCEDURE — 83735 ASSAY OF MAGNESIUM: CPT | Mod: ORL

## 2025-01-16 LAB
ANION GAP SERPL CALCULATED.3IONS-SCNC: 10 MMOL/L (ref 7–15)
BUN SERPL-MCNC: 5 MG/DL (ref 6–20)
CALCIUM SERPL-MCNC: 9.3 MG/DL (ref 8.8–10.4)
CHLORIDE SERPL-SCNC: 99 MMOL/L (ref 98–107)
CREAT SERPL-MCNC: 0.57 MG/DL (ref 0.51–0.95)
EGFRCR SERPLBLD CKD-EPI 2021: >90 ML/MIN/1.73M2
GLUCOSE SERPL-MCNC: 116 MG/DL (ref 70–99)
HCO3 SERPL-SCNC: 26 MMOL/L (ref 22–29)
MAGNESIUM SERPL-MCNC: 1.8 MG/DL (ref 1.7–2.3)
POTASSIUM SERPL-SCNC: 3.7 MMOL/L (ref 3.4–5.3)
SODIUM SERPL-SCNC: 135 MMOL/L (ref 135–145)

## 2025-02-05 ENCOUNTER — OFFICE VISIT (OUTPATIENT)
Dept: PHARMACY | Facility: PHYSICIAN GROUP | Age: 60
End: 2025-02-05

## 2025-02-05 VITALS — SYSTOLIC BLOOD PRESSURE: 118 MMHG | WEIGHT: 279 LBS | DIASTOLIC BLOOD PRESSURE: 62 MMHG | BODY MASS INDEX: 46.43 KG/M2

## 2025-02-05 DIAGNOSIS — Z79.4 TYPE 2 DIABETES MELLITUS WITHOUT COMPLICATION, WITH LONG-TERM CURRENT USE OF INSULIN (H): Primary | ICD-10-CM

## 2025-02-05 DIAGNOSIS — E11.9 TYPE 2 DIABETES MELLITUS WITHOUT COMPLICATION, WITH LONG-TERM CURRENT USE OF INSULIN (H): Primary | ICD-10-CM

## 2025-02-05 PROCEDURE — 99207 PR NO CHARGE LOS: CPT | Performed by: PHARMACIST

## 2025-02-05 RX ORDER — HYDROCHLOROTHIAZIDE 12.5 MG/1
CAPSULE ORAL
COMMUNITY
Start: 2025-01-29

## 2025-02-05 NOTE — PROGRESS NOTES
Medication Therapy Management (MTM) Encounter    ASSESSMENT:                            Medication Adherence/Access: No issues identified.    Diabetes   Patient is not meeting A1c less than 7%. However, her blood sugars have shown good improvement according to her Jesus report.  She has had low blood sugar episodes the last few days. Advise decreasing Lantus to 44 units once daily and continuing with Ozempic 1 mg weekly. Long term goal to increase Ozempic to 2 mg weekly and further decrease insulin dose.     PLAN:                            Reduce your Lantus insulin to 44 units once daily.   Continue using your Freestyle Jesus sensor to track your blood sugars throughout the day.  Focus on limiting your carb intake and increasing protein.     Follow-up: Return in about 5 weeks (around 3/12/2025) for Follow up with Garima Bass.    SUBJECTIVE/OBJECTIVE:                          Monica Miguel is a 59 year old female seen for a follow up visit.     Reason for visit: Discuss CGM.     Allergies/ADRs: Reviewed in chart  Past Medical History: Reviewed in chart  Tobacco: She reports that she has quit smoking. She does not have any smokeless tobacco history on file.  Alcohol: none    Medication Adherence/Access: no issues reported. Obtaining Ozempic through Patient assistance program       Diabetes   Lantus 46 units subcutaneous injection once daily   Ozempic 1 mg subcutaneous weekly injection   Pioglitazone 45 mg tablet by mouth daily   Aspirin 81mg daily  Freestyle jesus 3 plus sensor     Patient is not experiencing side effects. She has enjoyed wearing the jesus sensor over the last week and is learning what foods increase her blood sugars. We discussed adding more protein to her diet as well. She did have a few low blood sugar episodes the last few days in the morning.     Current diabetes symptoms: none     Blood sugar monitoring: see AGP below  Name: Monica Miguel  YOB: 1965  Report Period: 01/23/2025 -  02/05/2025 (14 days)  Generated: 02/05/2025  Time CGM Active: 36%      Glucose Statistics and Targets  Average Glucose: 133 mg/dL  Glucose Management Indicator (GMI): 6.5%  Glucose Variability (%CV): 28.2%  Target Range: 70 - 180 mg/dL      Time in Ranges  Very High: >250 mg/dL --- 0%  High: 181 - 250 mg/dL --- 12%  Target Range: 70 - 180 mg/dL --- 88%  Low: 54 - 69 mg/dL --- 0%  Very Low: <54 mg/dL --- 0%    Eye exam is up to date  Foot exam: up to date        Today's Vitals: /62   Wt 279 lb (126.6 kg)   BMI 46.43 kg/m    ----------------  Post Discharge Medication Reconciliation Status: discharge medications reconciled, continue medications without change.    I spent 30 minutes with this patient today. All changes were made via collaborative practice agreement with Garima Bass PA-C.     A summary of these recommendations was sent via clinic portal.    Angelique Jordan, PharmD  Medication Therapy Management Pharmacist       Medication Therapy Recommendations  Type 2 diabetes mellitus without complication, with long-term current use of insulin (H)   1 Current Medication: insulin glargine 100 UNIT/ML pen   Current Medication Sig: Inject 46 Units subcutaneously at bedtime.   Rationale: Dose too high - Dosage too high - Safety   Recommendation: Decrease Dose   Status: Accepted per CPA   Identified Date: 2/5/2025 Completed Date: 2/5/2025

## 2025-02-05 NOTE — PATIENT INSTRUCTIONS
"Recommendations from today's MTM visit:                                                       Reduce your Lantus insulin to 44 units once daily.   Continue using your Freestyle Jesus sensor to track your blood sugars throughout the day.  Focus on limiting your carb intake and increasing protein.     Follow-up: Return in about 5 weeks (around 3/12/2025) for Follow up with Garima Bass.    It was great speaking with you today.  I value your experience and would be very thankful for your time in providing feedback in our clinic survey. In the next few days, you may receive an email or text message from [employer] with a link to a survey related to your  clinical pharmacist.\"     To schedule another MTM appointment, please call the clinic directly or you may call the MTM scheduling line at [phone].    My Clinical Pharmacist's contact information:                                                      Please feel free to contact me with any questions or concerns you have.      Angelique Jordan, PharmD  Medication Therapy Management Pharmacist    "

## 2025-03-05 ENCOUNTER — LAB REQUISITION (OUTPATIENT)
Dept: LAB | Facility: CLINIC | Age: 60
End: 2025-03-05
Payer: MEDICARE

## 2025-03-05 DIAGNOSIS — E11.40 TYPE 2 DIABETES MELLITUS WITH DIABETIC NEUROPATHY, UNSPECIFIED (H): ICD-10-CM

## 2025-03-05 LAB
ANION GAP SERPL CALCULATED.3IONS-SCNC: 10 MMOL/L (ref 7–15)
BUN SERPL-MCNC: 15.6 MG/DL (ref 6–20)
CALCIUM SERPL-MCNC: 9.9 MG/DL (ref 8.8–10.4)
CHLORIDE SERPL-SCNC: 102 MMOL/L (ref 98–107)
CREAT SERPL-MCNC: 0.59 MG/DL (ref 0.51–0.95)
CREAT UR-MCNC: 35.9 MG/DL
EGFRCR SERPLBLD CKD-EPI 2021: >90 ML/MIN/1.73M2
GLUCOSE SERPL-MCNC: 152 MG/DL (ref 70–99)
HCO3 SERPL-SCNC: 27 MMOL/L (ref 22–29)
MICROALBUMIN UR-MCNC: <12 MG/L
MICROALBUMIN/CREAT UR: NORMAL MG/G{CREAT}
POTASSIUM SERPL-SCNC: 4.3 MMOL/L (ref 3.4–5.3)
SODIUM SERPL-SCNC: 139 MMOL/L (ref 135–145)

## 2025-03-05 PROCEDURE — 82043 UR ALBUMIN QUANTITATIVE: CPT | Mod: ORL

## 2025-03-05 PROCEDURE — 80048 BASIC METABOLIC PNL TOTAL CA: CPT | Mod: ORL

## 2025-03-05 PROCEDURE — 82570 ASSAY OF URINE CREATININE: CPT | Mod: ORL

## 2025-06-20 ENCOUNTER — LAB REQUISITION (OUTPATIENT)
Dept: LAB | Facility: CLINIC | Age: 60
End: 2025-06-20
Payer: MEDICARE

## 2025-06-20 DIAGNOSIS — E11.40 TYPE 2 DIABETES MELLITUS WITH DIABETIC NEUROPATHY, UNSPECIFIED (H): ICD-10-CM

## 2025-06-20 PROCEDURE — 80061 LIPID PANEL: CPT | Mod: ORL

## 2025-06-21 LAB
CHOLEST SERPL-MCNC: 180 MG/DL
FASTING STATUS PATIENT QL REPORTED: YES
HDLC SERPL-MCNC: 45 MG/DL
LDLC SERPL CALC-MCNC: 95 MG/DL
NONHDLC SERPL-MCNC: 135 MG/DL
TRIGL SERPL-MCNC: 198 MG/DL

## (undated) DEVICE — STPL POWERED ECHELON 45MM PSEE45A

## (undated) DEVICE — PREP CHLORAPREP 26ML TINTED HI-LITE ORANGE 930815

## (undated) DEVICE — GLOVE BIOGEL PI MICRO INDICATOR UNDERGLOVE SZ 6.5 48965

## (undated) DEVICE — ENDO SCOPE WARMER LF TM500

## (undated) DEVICE — ENDO TROCAR FIRST ENTRY KII FIOS Z-THRD 12X150MM CTF71

## (undated) DEVICE — SOL WATER IRRIG 1000ML BOTTLE 2F7114

## (undated) DEVICE — ENDO POUCH UNIV RETRIEVAL SYSTEM INZII 10MM CD001

## (undated) DEVICE — SUCTION IRR STRYKERFLOW II W/TIP 250-070-520

## (undated) DEVICE — ESU LIGASURE MARYLAND LAPAROSCOPIC SLR/DVDR 5MMX37CM LF1937

## (undated) DEVICE — ESU HOLDER LAP INST DISP PURPLE LONG 330MM H-PRO-330

## (undated) DEVICE — ENDO TROCAR FIRST ENTRY KII FIOS Z-THRD 05X100MM CTF03

## (undated) DEVICE — ENDO TROCAR FIRST ENTRY KII FIOS Z-THRD 05X150MM CTF01

## (undated) DEVICE — SU VICRYL 0 UR-6 27" J603H

## (undated) DEVICE — ENDO TROCAR SLEEVE KII Z-THREADED 05X100MM CTS02

## (undated) DEVICE — ESU GROUND PAD UNIVERSAL W/O CORD

## (undated) DEVICE — PACK LAP CHOLE SLC15LCFSD

## (undated) DEVICE — LINEN TOWEL PACK X5 5464

## (undated) DEVICE — BAG DECANTER STERILE WHITE DYNJDEC09

## (undated) DEVICE — GLOVE BIOGEL PI MICRO SZ 6.5 48565

## (undated) DEVICE — STPL RELOAD REG TISSUE ECHELON 45 X 3.6MM BLUE GST45B

## (undated) DEVICE — VIAL DECANTER STERILE WHITE DYNJDEC06

## (undated) DEVICE — SU MONOCRYL 4-0 PS-2 18" UND Y496G

## (undated) DEVICE — NEEDLE HYPO MONOJECT STANDARD 22GA 1 1/2IN BLUE 1188822112

## (undated) DEVICE — SOL NACL 0.9% INJ 1000ML BAG 2B1324X

## (undated) RX ORDER — FENTANYL CITRATE 50 UG/ML
INJECTION, SOLUTION INTRAMUSCULAR; INTRAVENOUS
Status: DISPENSED
Start: 2024-12-22

## (undated) RX ORDER — GLYCOPYRROLATE 0.2 MG/ML
INJECTION, SOLUTION INTRAMUSCULAR; INTRAVENOUS
Status: DISPENSED
Start: 2024-12-22

## (undated) RX ORDER — CLINDAMYCIN PHOSPHATE 900 MG/50ML
INJECTION, SOLUTION INTRAVENOUS
Status: DISPENSED
Start: 2024-12-22

## (undated) RX ORDER — BUPIVACAINE HYDROCHLORIDE AND EPINEPHRINE 5; 5 MG/ML; UG/ML
INJECTION, SOLUTION EPIDURAL; INTRACAUDAL; PERINEURAL
Status: DISPENSED
Start: 2024-12-22